# Patient Record
Sex: FEMALE | Race: WHITE | NOT HISPANIC OR LATINO | Employment: OTHER | URBAN - METROPOLITAN AREA
[De-identification: names, ages, dates, MRNs, and addresses within clinical notes are randomized per-mention and may not be internally consistent; named-entity substitution may affect disease eponyms.]

---

## 2017-01-26 ENCOUNTER — OFFICE VISIT (OUTPATIENT)
Dept: CARDIOLOGY | Facility: MEDICAL CENTER | Age: 77
End: 2017-01-26
Payer: MEDICARE

## 2017-01-26 VITALS
OXYGEN SATURATION: 96 % | BODY MASS INDEX: 28.66 KG/M2 | SYSTOLIC BLOOD PRESSURE: 140 MMHG | WEIGHT: 146 LBS | DIASTOLIC BLOOD PRESSURE: 80 MMHG | HEART RATE: 84 BPM | HEIGHT: 60 IN

## 2017-01-26 DIAGNOSIS — I72.0 CAROTID ARTERY ANEURYSM (HCC): ICD-10-CM

## 2017-01-26 DIAGNOSIS — E78.5 HYPERLIPIDEMIA WITH TARGET LDL LESS THAN 100: ICD-10-CM

## 2017-01-26 DIAGNOSIS — Z98.890 S/P CARDIAC CATHETERIZATION: Chronic | ICD-10-CM

## 2017-01-26 DIAGNOSIS — I44.2 COMPLETE HEART BLOCK (HCC): ICD-10-CM

## 2017-01-26 PROCEDURE — G8420 CALC BMI NORM PARAMETERS: HCPCS | Performed by: INTERNAL MEDICINE

## 2017-01-26 PROCEDURE — 1036F TOBACCO NON-USER: CPT | Performed by: INTERNAL MEDICINE

## 2017-01-26 PROCEDURE — G8432 DEP SCR NOT DOC, RNG: HCPCS | Performed by: INTERNAL MEDICINE

## 2017-01-26 PROCEDURE — 99214 OFFICE O/P EST MOD 30 MIN: CPT | Performed by: INTERNAL MEDICINE

## 2017-01-26 PROCEDURE — 1101F PT FALLS ASSESS-DOCD LE1/YR: CPT | Mod: 8P | Performed by: INTERNAL MEDICINE

## 2017-01-26 PROCEDURE — G8484 FLU IMMUNIZE NO ADMIN: HCPCS | Performed by: INTERNAL MEDICINE

## 2017-01-26 PROCEDURE — 4040F PNEUMOC VAC/ADMIN/RCVD: CPT | Performed by: INTERNAL MEDICINE

## 2017-01-26 RX ORDER — DOXYCYCLINE HYCLATE 100 MG
100 TABLET ORAL 2 TIMES DAILY
COMMUNITY
End: 2018-06-21

## 2017-01-26 ASSESSMENT — ENCOUNTER SYMPTOMS
DIZZINESS: 0
NAUSEA: 0
HEARTBURN: 0
LOSS OF CONSCIOUSNESS: 0
WEIGHT LOSS: 0
MUSCULOSKELETAL NEGATIVE: 1
NERVOUS/ANXIOUS: 0
BRUISES/BLEEDS EASILY: 0
PALPITATIONS: 0
SHORTNESS OF BREATH: 0
EYES NEGATIVE: 1
COUGH: 0

## 2017-01-26 NOTE — MR AVS SNAPSHOT
Kaila Choudhary   2017 1:15 PM   Office Visit   MRN: [id number]    Department:  [facility]   Dept Phone:  [phone]    Description:  Female : 1940   Provider:  Julienne Mar M.D.           Reason for Visit     Follow-Up           Allergies as of 2017     Allergen Noted Reactions    Sulfa Drugs 2010   Rash, Swelling    Anasarca-especially face    Latex 10/09/2013   Rash, Itching    Tape 2010   Rash    Latex tape  (paper tape OK)      You were diagnosed with     Hyperlipidemia with target LDL less than 100   [581370]       S/P cardiac catheterization   [668490]       Carotid artery aneurysm (CMS-HCC)   [302920]       Complete heart block (CMS-HCC)   [888055]         Vital Signs     Blood Pressure Pulse Height Weight Body Mass Index Oxygen Saturation    140/80 mmHg 84 1.524 m (5') 66.225 kg (146 lb) 28.51 kg/m2 96%    Smoking Status                   Never Smoker            Basic Information     Date Of Birth Sex Race Ethnicity Preferred Language    1940 Female White Non- English      Your appointments     2017  9:00 AM   Established Patient with Zoltan Devlin M.D.   [facility]    [address]  [facility] [id number] [phone]           You will be receiving a confirmation call a few days before your appointment from our automated call confirmation system.              Problem List              ICD-10-CM Priority Class Noted - Resolved    Hyperlipidemia with target LDL less than 100 E78.5   2011 - Present    Osteopenia M85.80   2013 - Present    Hypothyroidism E03.9   2013 - Present    Varicose veins I86.8   2013 - Present    Elevated blood pressure I10   2013 - Present    Vitamin D insufficiency E55.9   2015 - Present    S/P cardiac catheterization, minimal coronary artery disease 2016 (Chronic) Z98.890   Unknown - Present    Nonintractable episodic  headache R51   10/3/2016 - Present    HTN (hypertension) I10   10/12/2016 - Present    Hypothyroidism E03.9   10/12/2016 - Present    Carotid artery aneurysm (CMS-HCC) I72.0   1/26/2017 - Present    Complete heart block (CMS-HCC) I44.2   1/26/2017 - Present      Health Maintenance        Date Due Completion Dates    PAP SMEAR 5/11/1961 ---    IMM ZOSTER VACCINE 5/11/2000 ---    IMM INFLUENZA (1) 9/1/2016 ---    MAMMOGRAM 12/14/2016 12/14/2015, 12/11/2014, 3/22/2013, 3/18/2013, 3/7/2012, 12/23/2010, 11/24/2009, 11/24/2009, 10/23/2008, 10/23/2008, 10/1/2007, 10/1/2007, 9/12/2006, 7/27/2005    COLONOSCOPY 1/1/2019 1/1/2009 (Done)    Override on 1/1/2009: Done    BONE DENSITY 12/14/2020 12/14/2015, 3/18/2013, 12/23/2010, 11/6/2008    IMM DTaP/Tdap/Td Vaccine (2 - Td) 1/2/2023 1/2/2013            Current Immunizations     13-VALENT PCV PREVNAR 10/3/2016    Pneumococcal polysaccharide vaccine (PPSV-23) 10/16/2008    Tdap Vaccine 1/2/2013      Below and/or attached are the medications your provider expects you to take. Review all of your home medications and newly ordered medications with your provider and/or pharmacist. Follow medication instructions as directed by your provider and/or pharmacist. Please keep your medication list with you and share with your provider. Update the information when medications are discontinued, doses are changed, or new medications (including over-the-counter products) are added; and carry medication information at all times in the event of emergency situations     Allergies:  SULFA DRUGS - Rash,Swelling     LATEX - Rash,Itching     TAPE - Rash               Medications  Valid as of: January 26, 2017 -  1:51 PM    Generic Name Brand Name Tablet Size Instructions for use    Aspirin (Chew Tab) ASA 81 MG Take 81 mg by mouth every morning.        Atorvastatin Calcium (Tab) LIPITOR 40 MG Take 1 Tab by mouth every day.        Calcium Carbonate (Tab) OS-KINGA 500 1250 (500 CA) MG Take 1,250 mg by  mouth every morning.        Cholecalciferol (Tab) cholecalciferol 1000 UNIT Take 1,000 Units by mouth every morning.        Doxycycline Hyclate (Tab) VIBRAMYCIN 100 MG Take 100 mg by mouth 2 times a day.        Levothyroxine Sodium (Tab) SYNTHROID 88 MCG Take 1 tablet by mouth  every morning on an empty  stomach        .                 Medicines prescribed today were sent to:     [facility] - [address]    [address] [facility] [address] [facility] [address]    Phone: [phone] Fax: [phone]    Open 24 Hours?: No    [id number] [place] [place] - [address]    [address] [place] [address]    Phone: [phone] Fax: [phone]    Open 24 Hours?: No      Medication refill instructions:       If your prescription bottle indicates you have medication refills left, it is not necessary to call your provider’s office. Please contact your pharmacy and they will refill your medication.    If your prescription bottle indicates you do not have any refills left, you may request refills at any time through one of the following ways: The online GoMore system (except Urgent Care), by calling your provider’s office, or by asking your pharmacy to contact your provider’s office with a refill request. Medication refills are processed only during regular business hours and may not be available until the next business day. Your provider may request additional information or to have a follow-up visit with you prior to refilling your medication.   *Please Note: Medication refills are assigned a new Rx number when refilled electronically. Your pharmacy may indicate that no refills were authorized even though a new prescription for the same medication is available at the pharmacy. Please request the medicine by name with the pharmacy before contacting your provider for a refill.           GoMore Access Code: Activation code not generated  Current GoMore Status: Active

## 2017-01-26 NOTE — PROGRESS NOTES
"Subjective:   Kaila Choudhary is a 76 y.o. female who presents today in follow-up in regards to her mild coronary disease and new pacemaker put in for heart block and syncope last fall    She is switching her care from her previous cardiologist  She is in with her     Offers no complaints, medications as directed    Past Medical History   Diagnosis Date   • Thyroid disease    • Hyperlipidemia    • OSTEOPOROSIS 2/13/2013   • Hypothyroidism 2/13/2013   • Skin infection 6/25/2014   • Cholesterol blood decreased    • Cerebral aneurysm    • Arthritis      L hip   • Pain      L hip is \"nagging\"   • Dental disorder      front upper veneers   • CATARACT      R eye repaired   • Hypertension    • S/P cardiac catheterization    • Cancer (CMS-[facility]) 2013     basal cell head     Past Surgical History   Procedure Laterality Date   • Recovery  10/16/2013     Performed by [facility] at SURGERY [facility] ORS   • Shoulder surgery  1998   • Gyn surgery  1982     hysterectomy   • Other  2004     R cataract-IOL   • Recovery  7/24/2014     Performed by [facility] at SURGERY SAME DAY [facility] ORS   • Recovery  3/21/2016     Procedure: CATH LAB [facility] WITH POSSIBLE WIEDENBECK ;  Surgeon: Recoveryonly Surgery;  [place] [facility];  Service:    • Tonsillectomy       Family History   Problem Relation Age of Onset   • Heart Disease Mother    • Heart Disease Father      History   Smoking status   • Never Smoker    Smokeless tobacco   • Never Used     Allergies   Allergen Reactions   • Sulfa Drugs Rash and Swelling     Anasarca-especially face   • Latex Rash and Itching   • Tape Rash     Latex tape  (paper tape OK)     Outpatient Encounter Prescriptions as of 1/26/2017   Medication Sig Dispense Refill   • doxycycline (VIBRAMYCIN) 100 MG Tab Take 100 mg by mouth 2 times a day.     • levothyroxine (SYNTHROID) 88 MCG Tab Take 1 tablet by mouth  every morning on an empty  stomach 100 Tab 3   • " atorvastatin (LIPITOR) 40 MG Tab Take 1 Tab by mouth every day. 90 Tab 3   • calcium carbonate (OS-KINGA 500) 1250 MG TABS Take 1,250 mg by mouth every morning.     • vitamin D (CHOLECALCIFEROL) 1000 UNIT TABS Take 1,000 Units by mouth every morning.     • aspirin (ASA) 81 MG CHEW Take 81 mg by mouth every morning.       No facility-administered encounter medications on file as of 1/26/2017.     Review of Systems   Constitutional: Negative for weight loss and malaise/fatigue.   Eyes: Negative.    Respiratory: Negative for cough and shortness of breath.    Cardiovascular: Negative for chest pain, palpitations and leg swelling.   Gastrointestinal: Negative for heartburn and nausea.   Musculoskeletal: Negative.    Neurological: Negative for dizziness and loss of consciousness.   Endo/Heme/Allergies: Does not bruise/bleed easily.   Psychiatric/Behavioral: The patient is not nervous/anxious.    All other systems reviewed and are negative.       Objective:   /80 mmHg  Pulse 84  Ht 1.524 m (5')  Wt 66.225 kg (146 lb)  BMI 28.51 kg/m2  SpO2 96%    Physical Exam   Constitutional: She is oriented to person, place, and time.   Eyes: EOM are normal. Pupils are equal, round, and reactive to light.   Neck: No JVD present.   Cardiovascular: Normal rate and regular rhythm.    Pacemaker left chest   Pulmonary/Chest: Breath sounds normal.   Abdominal: Bowel sounds are normal.   Musculoskeletal: She exhibits edema (trace nonpitting bilaterally).   Neurological: She is alert and oriented to person, place, and time. No cranial nerve deficit.   Skin: Skin is warm and dry.       Assessment:     1. Hyperlipidemia with target LDL less than 100     2. S/P cardiac catheterization, minimal coronary artery disease April 2016     3. Carotid artery aneurysm (CMS-[facility])         Medical Decision Making:  Today's Assessment / Status / Plan:     I did take the time to review her chart, she had been somewhat upset after her urgent hospital stay  last fall. We went through this and talked about heart block and syncope in the differential diagnosis    Reassurance is given  We spent more than 20 minutes    I also reviewed her pacemaker check which is quite excellent. We'll try to amalgamate into one day    Lipids on statin, no changes  Coronary disease, as above  RTC 4-6 months with a visit and pacemaker check

## 2017-05-02 ENCOUNTER — PATIENT OUTREACH (OUTPATIENT)
Dept: HEALTH INFORMATION MANAGEMENT | Facility: OTHER | Age: 77
End: 2017-05-02

## 2017-05-02 NOTE — PROGRESS NOTES
Outcome: Left Message    WebIZ Checked & Epic Updated:  yes    HealthConnect Verified: no    Attempt # 1ST

## 2017-05-18 NOTE — PROGRESS NOTES
Attempt #:2    WebIZ Checked & Epic Updated: yes  HealthConnect Verified: no  Verify PCP: yes    Communication Preference Obtained: yes -- PCP updated in patient's chart.     Review Care Team: yes    Annual Wellness Visit Scheduling  1. Scheduling Status:Not Scheduled. Patient states they are under Doctor's care -- Will have this appointment completed with new PCP    Care Gap Scheduling -- Informed of care gaps due to discuss with new PCP     Health Maintenance Due   Topic Date Due   • PAP SMEAR  05/11/1961   • IMM ZOSTER VACCINE  05/11/2000   • Annual Wellness Visit  10/20/2016   • MAMMOGRAM  12/14/2016         Feedback Activation: already active  Feedback Marquita: no  Virtual Visits: no  Opt In to Text Messages: no

## 2017-05-19 ENCOUNTER — OFFICE VISIT (OUTPATIENT)
Dept: CARDIOLOGY | Facility: MEDICAL CENTER | Age: 77
End: 2017-05-19
Payer: MEDICARE

## 2017-05-19 ENCOUNTER — NON-PROVIDER VISIT (OUTPATIENT)
Dept: CARDIOLOGY | Facility: MEDICAL CENTER | Age: 77
End: 2017-05-19
Payer: MEDICARE

## 2017-05-19 VITALS
WEIGHT: 143 LBS | OXYGEN SATURATION: 93 % | SYSTOLIC BLOOD PRESSURE: 140 MMHG | DIASTOLIC BLOOD PRESSURE: 80 MMHG | HEIGHT: 60 IN | HEART RATE: 72 BPM | BODY MASS INDEX: 28.07 KG/M2

## 2017-05-19 DIAGNOSIS — F43.21 GRIEF REACTION: ICD-10-CM

## 2017-05-19 DIAGNOSIS — I44.2 COMPLETE HEART BLOCK (HCC): ICD-10-CM

## 2017-05-19 PROBLEM — F43.20 GRIEF REACTION: Status: ACTIVE | Noted: 2017-05-19

## 2017-05-19 PROCEDURE — 99213 OFFICE O/P EST LOW 20 MIN: CPT | Performed by: INTERNAL MEDICINE

## 2017-05-19 PROCEDURE — 4040F PNEUMOC VAC/ADMIN/RCVD: CPT | Performed by: INTERNAL MEDICINE

## 2017-05-19 PROCEDURE — 1101F PT FALLS ASSESS-DOCD LE1/YR: CPT | Mod: 8P | Performed by: INTERNAL MEDICINE

## 2017-05-19 PROCEDURE — 1036F TOBACCO NON-USER: CPT | Performed by: INTERNAL MEDICINE

## 2017-05-19 PROCEDURE — G8432 DEP SCR NOT DOC, RNG: HCPCS | Performed by: INTERNAL MEDICINE

## 2017-05-19 PROCEDURE — G8419 CALC BMI OUT NRM PARAM NOF/U: HCPCS | Performed by: INTERNAL MEDICINE

## 2017-05-19 PROCEDURE — 93280 PM DEVICE PROGR EVAL DUAL: CPT | Performed by: INTERNAL MEDICINE

## 2017-05-19 ASSESSMENT — ENCOUNTER SYMPTOMS
INSOMNIA: 1
SHORTNESS OF BREATH: 0
EYES NEGATIVE: 1
BRUISES/BLEEDS EASILY: 0
NERVOUS/ANXIOUS: 1
PALPITATIONS: 0
MUSCULOSKELETAL NEGATIVE: 1
DIZZINESS: 0
COUGH: 0
WEIGHT LOSS: 1
LOSS OF CONSCIOUSNESS: 0
HEARTBURN: 0
MEMORY LOSS: 0
NAUSEA: 0
DEPRESSION: 1

## 2017-05-19 NOTE — Clinical Note
"     [facility]   [address] [facility] [facility] [address]  [place] [address]  Phone: [phone]  Fax: [phone]              Kaila Choudhary  1940    Encounter Date: 5/19/2017    Julienne Mar M.D.          PROGRESS NOTE:  Subjective:   Kaila Choudhary is a 77 y.o. female who presents today in follow-up in regards to her mild coronary disease noted in 2016 as well as her pacemaker for complete heart block last year    In with her daughter Pastora, she lost her  to pneumonia in March. Trying to get along. Trying to eat    Past Medical History   Diagnosis Date   • Thyroid disease    • Hyperlipidemia    • OSTEOPOROSIS 2/13/2013   • Hypothyroidism 2/13/2013   • Skin infection 6/25/2014   • Cholesterol blood decreased    • Cerebral aneurysm    • Arthritis      L hip   • Pain      L hip is \"nagging\"   • Dental disorder      front upper veneers   • CATARACT      R eye repaired   • Hypertension    • S/P cardiac catheterization    • Cancer (CMS-HCC) 2013     basal cell head     Past Surgical History   Procedure Laterality Date   • Recovery  10/16/2013     Performed by [facility] at SURGERY [facility] ORS   • Shoulder surgery  1998   • Gyn surgery  1982     hysterectomy   • Other  2004     R cataract-IOL   • Recovery  7/24/2014     Performed by [facility]Recovery Surgery at SURGERY SAME DAY [facility] ORS   • Recovery  3/21/2016     Procedure: CATH LAB [facility] WITH POSSIBLE WIEDENBECK ;  Surgeon: Recoveryonly Surgery;  Location: SURGERY PRE-POST PROC UNIT [facility];  Service:    • Tonsillectomy       Family History   Problem Relation Age of Onset   • Heart Disease Mother    • Heart Disease Father      History   Smoking status   • Never Smoker    Smokeless tobacco   • Never Used     Allergies   Allergen Reactions   • Sulfa Drugs Rash and Swelling     Anasarca-especially face   • Latex Rash and Itching   • Tape Rash     Latex tape  (paper tape OK)     Outpatient Encounter " Prescriptions as of 5/19/2017   Medication Sig Dispense Refill   • levothyroxine (SYNTHROID) 88 MCG Tab Take 1 tablet by mouth  every morning on an empty  stomach 100 Tab 3   • atorvastatin (LIPITOR) 40 MG Tab Take 1 Tab by mouth every day. 90 Tab 3   • calcium carbonate (OS-KINGA 500) 1250 MG TABS Take 1,250 mg by mouth every morning.     • vitamin D (CHOLECALCIFEROL) 1000 UNIT TABS Take 1,000 Units by mouth every morning.     • aspirin (ASA) 81 MG CHEW Take 81 mg by mouth every morning.     • doxycycline (VIBRAMYCIN) 100 MG Tab Take 100 mg by mouth 2 times a day.       No facility-administered encounter medications on file as of 5/19/2017.     Review of Systems   Constitutional: Positive for weight loss. Negative for malaise/fatigue.   Eyes: Negative.    Respiratory: Negative for cough and shortness of breath.    Cardiovascular: Negative for chest pain, palpitations and leg swelling.   Gastrointestinal: Negative for heartburn and nausea.   Musculoskeletal: Negative.    Neurological: Negative for dizziness and loss of consciousness.   Endo/Heme/Allergies: Does not bruise/bleed easily.   Psychiatric/Behavioral: Positive for depression. Negative for suicidal ideas and memory loss. The patient is nervous/anxious and has insomnia.    All other systems reviewed and are negative.       Objective:   /80 mmHg  Pulse 72  Ht 1.524 m (5')  Wt 64.864 kg (143 lb)  BMI 27.93 kg/m2  SpO2 93%    Physical Exam   Constitutional: She is oriented to person, place, and time.   HENT:   Head: Normocephalic and atraumatic.   Eyes: EOM are normal. Pupils are equal, round, and reactive to light. No scleral icterus.   Neck: No JVD present. No thyromegaly present.   Cardiovascular: Normal rate and regular rhythm.    Pacemaker left chest   Pulmonary/Chest: Breath sounds normal.   Abdominal: Bowel sounds are normal. She exhibits no distension.   Musculoskeletal: She exhibits edema (trace nonpitting bilaterally).   Neurological: She is  alert and oriented to person, place, and time. No cranial nerve deficit. She exhibits normal muscle tone. Coordination normal.   Skin: Skin is warm and dry.   Psychiatric:   Tearful but appropriate       Assessment:     1. Grief reaction         Medical Decision Making:  Today's Assessment / Status / Plan:     Heart block, we reviewed her pacemaker check. She has had no more fibrillation, only 3 very short bouts of SVT. These were in January. No changes made. Talked at length about this. I certainly would not give her medication, she is feeling poorly    Coronary disease, on a statin and a baby aspirin. Asymptomatic and highly functional    Counseled on grief, condolences given  RTC for pacemaker check in 6 months, I will see her back in the year    If she has more palpitations or SVT I will check a TSH again      Kj Mock D.O.  [address]   [address]  [place] [address]  VIA Facsimile: [phone]

## 2017-05-19 NOTE — MR AVS SNAPSHOT
Kaila Choudhary   2017 4:00 PM   Office Visit   MRN: [id number]    Department:  [facility]   Dept Phone:  [phone]    Description:  Female : 1940   Provider:  Julienne Mar M.D.           Reason for Visit     Follow-Up           Allergies as of 2017     Allergen Noted Reactions    Sulfa Drugs 2010   Rash, Swelling    Anasarca-especially face    Latex 10/09/2013   Rash, Itching    Tape 2010   Rash    Latex tape  (paper tape OK)      Vital Signs     Blood Pressure Pulse Height Weight Body Mass Index Oxygen Saturation    140/80 mmHg 72 1.524 m (5') 64.864 kg (143 lb) 27.93 kg/m2 93%    Smoking Status                   Never Smoker            Basic Information     Date Of Birth Sex Race Ethnicity Preferred Language    1940 Female White Non- English      Problem List              ICD-10-CM Priority Class Noted - Resolved    Hyperlipidemia with target LDL less than 100 E78.5   2011 - Present    Osteopenia M85.80   2013 - Present    Hypothyroidism E03.9   2013 - Present    Varicose veins I86.8   2013 - Present    Elevated blood pressure [id number]   2013 - Present    Vitamin D insufficiency E55.9   2015 - Present    S/P cardiac catheterization, minimal coronary artery disease 2016 (Chronic) Z98.890   Unknown - Present    Nonintractable episodic headache R51   10/3/2016 - Present    HTN (hypertension) I10   10/12/2016 - Present    Hypothyroidism E03.9   10/12/2016 - Present    Carotid artery aneurysm (CMS-HCC) I72.0   2017 - Present    Complete heart block (CMS-HCC) I44.2   2017 - Present      Health Maintenance        Date Due Completion Dates    PAP SMEAR 1961 ---    IMM ZOSTER VACCINE 2000 ---    MAMMOGRAM 2016, 2014, 3/22/2013, 3/18/2013, 3/7/2012, 2010, 2009, 2009, 10/23/2008, 10/23/2008, 10/1/2007, 10/1/2007, 2006, 2005    COLONOSCOPY 2019  1/1/2009 (Done)    Override on 1/1/2009: Done    BONE DENSITY 12/14/2020 12/14/2015, 3/18/2013, 12/23/2010, 11/6/2008    IMM DTaP/Tdap/Td Vaccine (2 - Td) 1/2/2023 1/2/2013            Current Immunizations     13-VALENT PCV PREVNAR 10/3/2016    Pneumococcal polysaccharide vaccine (PPSV-23) 10/16/2008    Tdap Vaccine 1/2/2013      Below and/or attached are the medications your provider expects you to take. Review all of your home medications and newly ordered medications with your provider and/or pharmacist. Follow medication instructions as directed by your provider and/or pharmacist. Please keep your medication list with you and share with your provider. Update the information when medications are discontinued, doses are changed, or new medications (including over-the-counter products) are added; and carry medication information at all times in the event of emergency situations     Allergies:  SULFA DRUGS - Rash,Swelling     LATEX - Rash,Itching     TAPE - Rash               Medications  Valid as of: May 19, 2017 -  4:07 PM    Generic Name Brand Name Tablet Size Instructions for use    Aspirin (Chew Tab) ASA 81 MG Take 81 mg by mouth every morning.        Atorvastatin Calcium (Tab) LIPITOR 40 MG Take 1 Tab by mouth every day.        Calcium Carbonate (Tab) OS-KINGA 500 1250 (500 CA) MG Take 1,250 mg by mouth every morning.        Cholecalciferol (Tab) cholecalciferol 1000 UNIT Take 1,000 Units by mouth every morning.        Doxycycline Hyclate (Tab) VIBRAMYCIN 100 MG Take 100 mg by mouth 2 times a day.        Levothyroxine Sodium (Tab) SYNTHROID 88 MCG Take 1 tablet by mouth  every morning on an empty  stomach        .                 Medicines prescribed today were sent to:     [facility] - [address]    [address] [place] [address] [address] [facility] [address]    Phone: [phone] Fax: [phone]    Open 24 Hours?: No    [id number] [place] - [address] [facility]    [address] [place] [address]    Phone: [phone] Fax: [phone]    Open 24 Hours?: No      Medication refill instructions:       If your prescription bottle indicates you have medication refills left, it is not necessary to call your provider’s office. Please contact your pharmacy and they will refill your medication.    If your prescription bottle indicates you do not have any refills left, you may request refills at any time through one of the following ways: The online AboutOne system (except Urgent Care), by calling your provider’s office, or by asking your pharmacy to contact your provider’s office with a refill request. Medication refills are processed only during regular business hours and may not be available until the next business day. Your provider may request additional information or to have a follow-up visit with you prior to refilling your medication.   *Please Note: Medication refills are assigned a new Rx number when refilled electronically. Your pharmacy may indicate that no refills were authorized even though a new prescription for the same medication is available at the pharmacy. Please request the medicine by name with the pharmacy before contacting your provider for a refill.           AboutOne Access Code: Activation code not generated  Current AboutOne Status: Active

## 2017-05-19 NOTE — PROGRESS NOTES
"Subjective:   Kaila Choudhary is a 77 y.o. female who presents today in follow-up in regards to her mild coronary disease noted in 2016 as well as her pacemaker for complete heart block last year    In with her daughter Pastora, she lost her  to pneumonia in March. Trying to get along. Trying to eat    Past Medical History   Diagnosis Date   • Thyroid disease    • Hyperlipidemia    • OSTEOPOROSIS 2/13/2013   • Hypothyroidism 2/13/2013   • Skin infection 6/25/2014   • Cholesterol blood decreased    • Cerebral aneurysm    • Arthritis      L hip   • Pain      L hip is \"nagging\"   • Dental disorder      front upper veneers   • CATARACT      R eye repaired   • Hypertension    • S/P cardiac catheterization    • Cancer ([facility]) 2013     basal cell head     Past Surgical History   Procedure Laterality Date   • Recovery  10/16/2013     Performed by [facility]Recovery Surgery at SURGERY [facility] ORS   • Shoulder surgery  1998   • Gyn surgery  1982     hysterectomy   • Other  2004     R cataract-IOL   • Recovery  7/24/2014     Performed by Ir-Recovery Surgery at SURGERY SAME DAY [facility] ORS   • Recovery  3/21/2016     Procedure: CATH LAB [facility] WITH POSSIBLE WIEDENBECK ;  Surgeon: Recoveryonly Surgery;  Location: SURGERY PRE-POST PROC UNIT [facility] [place];  Service:    • Tonsillectomy       Family History   Problem Relation Age of Onset   • Heart Disease Mother    • Heart Disease Father      History   Smoking status   • Never Smoker    Smokeless tobacco   • Never Used     Allergies   Allergen Reactions   • Sulfa Drugs Rash and Swelling     Anasarca-especially face   • Latex Rash and Itching   • Tape Rash     Latex tape  (paper tape OK)     Outpatient Encounter Prescriptions as of 5/19/2017   Medication Sig Dispense Refill   • levothyroxine (SYNTHROID) 88 MCG Tab Take 1 tablet by mouth  every morning on an empty  stomach 100 Tab 3   • atorvastatin (LIPITOR) 40 MG Tab Take 1 Tab by mouth every day. 90 Tab 3   • calcium carbonate " (OS-KINGA 500) 1250 MG TABS Take 1,250 mg by mouth every morning.     • vitamin D (CHOLECALCIFEROL) 1000 UNIT TABS Take 1,000 Units by mouth every morning.     • aspirin (ASA) 81 MG CHEW Take 81 mg by mouth every morning.     • doxycycline (VIBRAMYCIN) 100 MG Tab Take 100 mg by mouth 2 times a day.       No facility-administered encounter medications on file as of 5/19/2017.     Review of Systems   Constitutional: Positive for weight loss. Negative for malaise/fatigue.   Eyes: Negative.    Respiratory: Negative for cough and shortness of breath.    Cardiovascular: Negative for chest pain, palpitations and leg swelling.   Gastrointestinal: Negative for heartburn and nausea.   Musculoskeletal: Negative.    Neurological: Negative for dizziness and loss of consciousness.   Endo/Heme/Allergies: Does not bruise/bleed easily.   Psychiatric/Behavioral: Positive for depression. Negative for suicidal ideas and memory loss. The patient is nervous/anxious and has insomnia.    All other systems reviewed and are negative.       Objective:   /80 mmHg  Pulse 72  Ht 1.524 m (5')  Wt 64.864 kg (143 lb)  BMI 27.93 kg/m2  SpO2 93%    Physical Exam   Constitutional: She is oriented to person, place, and time.   HENT:   Head: Normocephalic and atraumatic.   Eyes: EOM are normal. Pupils are equal, round, and reactive to light. No scleral icterus.   Neck: No JVD present. No thyromegaly present.   Cardiovascular: Normal rate and regular rhythm.    Pacemaker left chest   Pulmonary/Chest: Breath sounds normal.   Abdominal: Bowel sounds are normal. She exhibits no distension.   Musculoskeletal: She exhibits edema (trace nonpitting bilaterally).   Neurological: She is alert and oriented to person, place, and time. No cranial nerve deficit. She exhibits normal muscle tone. Coordination normal.   Skin: Skin is warm and dry.   Psychiatric:   Tearful but appropriate       Assessment:     1. Grief reaction         Medical Decision Making:   Today's Assessment / Status / Plan:     Heart block, we reviewed her pacemaker check. She has had no more fibrillation, only 3 very short bouts of SVT. These were in January. No changes made. Talked at length about this. I certainly would not give her medication, she is feeling poorly    Coronary disease, on a statin and a baby aspirin. Asymptomatic and highly functional    Counseled on grief, condolences given  RTC for pacemaker check in 6 months, I will see her back in the year    If she has more palpitations or SVT I will check a TSH again

## 2017-11-21 ENCOUNTER — NON-PROVIDER VISIT (OUTPATIENT)
Dept: CARDIOLOGY | Facility: MEDICAL CENTER | Age: 77
End: 2017-11-21
Payer: MEDICARE

## 2017-11-21 DIAGNOSIS — I44.2 COMPLETE HEART BLOCK (HCC): ICD-10-CM

## 2017-11-21 DIAGNOSIS — Z95.0 CARDIAC PACEMAKER IN SITU: ICD-10-CM

## 2017-11-21 PROCEDURE — 93280 PM DEVICE PROGR EVAL DUAL: CPT | Performed by: INTERNAL MEDICINE

## 2018-06-21 ENCOUNTER — NON-PROVIDER VISIT (OUTPATIENT)
Dept: CARDIOLOGY | Facility: MEDICAL CENTER | Age: 78
End: 2018-06-21
Payer: MEDICARE

## 2018-06-21 ENCOUNTER — OFFICE VISIT (OUTPATIENT)
Dept: CARDIOLOGY | Facility: MEDICAL CENTER | Age: 78
End: 2018-06-21
Payer: MEDICARE

## 2018-06-21 VITALS
DIASTOLIC BLOOD PRESSURE: 80 MMHG | OXYGEN SATURATION: 98 % | BODY MASS INDEX: 28.7 KG/M2 | SYSTOLIC BLOOD PRESSURE: 120 MMHG | HEIGHT: 61 IN | HEART RATE: 84 BPM | WEIGHT: 152 LBS

## 2018-06-21 DIAGNOSIS — Z95.0 CARDIAC PACEMAKER IN SITU: ICD-10-CM

## 2018-06-21 DIAGNOSIS — Z98.890 S/P CARDIAC CATHETERIZATION: Chronic | ICD-10-CM

## 2018-06-21 DIAGNOSIS — I44.2 COMPLETE HEART BLOCK (HCC): ICD-10-CM

## 2018-06-21 DIAGNOSIS — Z71.89 COUNSELING REGARDING END OF LIFE DECISION MAKING: ICD-10-CM

## 2018-06-21 DIAGNOSIS — I72.0 CAROTID ARTERY ANEURYSM (HCC): ICD-10-CM

## 2018-06-21 PROCEDURE — 99214 OFFICE O/P EST MOD 30 MIN: CPT | Performed by: INTERNAL MEDICINE

## 2018-06-21 PROCEDURE — 93280 PM DEVICE PROGR EVAL DUAL: CPT | Performed by: INTERNAL MEDICINE

## 2018-06-21 PROCEDURE — 99497 ADVNCD CARE PLAN 30 MIN: CPT | Performed by: INTERNAL MEDICINE

## 2018-06-21 RX ORDER — ESCITALOPRAM OXALATE 5 MG/1
5 TABLET ORAL DAILY
COMMUNITY
End: 2018-06-21 | Stop reason: SDUPTHER

## 2018-06-21 RX ORDER — DONEPEZIL HYDROCHLORIDE 5 MG/1
5 TABLET, FILM COATED ORAL NIGHTLY
COMMUNITY
End: 2019-01-03

## 2018-06-21 RX ORDER — ESCITALOPRAM OXALATE 5 MG/1
10 TABLET ORAL DAILY
Qty: 90 TAB | Refills: 1 | Status: SHIPPED | OUTPATIENT
Start: 2018-06-21 | End: 2018-07-13 | Stop reason: SDUPTHER

## 2018-06-21 ASSESSMENT — ENCOUNTER SYMPTOMS
EYES NEGATIVE: 1
FLANK PAIN: 0
DEPRESSION: 1
WEIGHT LOSS: 0
SHORTNESS OF BREATH: 0
PND: 0
DIZZINESS: 0
INSOMNIA: 1
NAUSEA: 0
BACK PAIN: 0
FALLS: 0
PALPITATIONS: 0
LOSS OF CONSCIOUSNESS: 0
MYALGIAS: 0
HEARTBURN: 0
COUGH: 0
MEMORY LOSS: 1

## 2018-06-21 ASSESSMENT — LIFESTYLE VARIABLES: SUBSTANCE_ABUSE: 0

## 2018-06-21 NOTE — PROGRESS NOTES
"Chief Complaint   Patient presents with   • Hyperlipidemia     follow up       Subjective:   Kaila Choudhary is a 78 y.o. female who presents today in follow-up in regards to her hyperlipidemia in the setting of pacemaker for complete heart block 2016    In with her other daughter.  She has 4 of them.  More loss after her , gets discouraged.  Thinking a lot about end-of-life  Medications as directed    Past Medical History:   Diagnosis Date   • Arthritis     L hip   • Cancer (HCC) 2013    basal cell head   • CATARACT     R eye repaired   • Cerebral aneurysm    • Cholesterol blood decreased    • Dental disorder     front upper veneers   • Hyperlipidemia    • Hypertension    • Hypothyroidism 2/13/2013   • OSTEOPOROSIS 2/13/2013   • Pain     L hip is \"nagging\"   • S/P cardiac catheterization    • Skin infection 6/25/2014   • Thyroid disease      Past Surgical History:   Procedure Laterality Date   • RECOVERY  3/21/2016    Procedure: CATH LAB [facility] WITH POSSIBLE WIEDENBECK ;  Surgeon: Recoveryonly Surgery;  [facility] [place] [facility];  Service:    • RECOVERY  7/24/2014    Performed by Ir-Recovery Surgery at SURGERY SAME DAY [facility] ORS   • RECOVERY  10/16/2013    Performed by Ir[facility]Recovery Surgery at SURGERY [facility] ORS   • OTHER  2004    R cataract-IOL   • SHOULDER SURGERY  1998   • GYN SURGERY  1982    hysterectomy   • TONSILLECTOMY       Family History   Problem Relation Age of Onset   • Heart Disease Mother    • Heart Disease Father      Social History     Social History   • Marital status:      Spouse name: N/A   • Number of children: N/A   • Years of education: N/A     Occupational History   • Not on file.     Social History Main Topics   • Smoking status: Never Smoker   • Smokeless tobacco: Never Used   • Alcohol use No   • Drug use: No   • Sexual activity: Not on file     Other Topics Concern   • Not on file     Social History Narrative   • No narrative on file     Allergies " "  Allergen Reactions   • Sulfa Drugs Rash and Swelling     Anasarca-especially face   • Latex Rash and Itching   • Tape Rash     Latex tape  (paper tape OK)     Outpatient Encounter Prescriptions as of 6/21/2018   Medication Sig Dispense Refill   • donepezil (ARICEPT) 5 MG Tab Take 5 mg by mouth every evening.     • Calcium Carbonate-Vitamin D (CALCIUM 600/VITAMIN D PO) Take  by mouth.     • escitalopram (LEXAPRO) 5 MG tablet Take 2 Tabs by mouth every day. 90 Tab 1   • levothyroxine (SYNTHROID) 88 MCG Tab Take 1 tablet by mouth  every morning on an empty  stomach 100 Tab 3   • atorvastatin (LIPITOR) 40 MG Tab Take 1 Tab by mouth every day. 90 Tab 3   • aspirin (ASA) 81 MG CHEW Take 81 mg by mouth every morning.     • [DISCONTINUED] escitalopram (LEXAPRO) 5 MG tablet Take 5 mg by mouth every day.     • [DISCONTINUED] doxycycline (VIBRAMYCIN) 100 MG Tab Take 100 mg by mouth 2 times a day.     • [DISCONTINUED] calcium carbonate (OS-KINGA 500) 1250 MG TABS Take 1,250 mg by mouth every morning.     • [DISCONTINUED] vitamin D (CHOLECALCIFEROL) 1000 UNIT TABS Take 1,000 Units by mouth every morning.       No facility-administered encounter medications on file as of 6/21/2018.      Review of Systems   Constitutional: Positive for malaise/fatigue. Negative for weight loss.   Eyes: Negative.    Respiratory: Negative for cough and shortness of breath.    Cardiovascular: Negative for chest pain, palpitations, leg swelling and PND.   Gastrointestinal: Negative for heartburn and nausea.   Genitourinary: Negative for dysuria and flank pain.   Musculoskeletal: Negative for back pain, falls, joint pain and myalgias.   Neurological: Negative for dizziness and loss of consciousness.   Psychiatric/Behavioral: Positive for depression and memory loss. Negative for substance abuse and suicidal ideas. The patient has insomnia.    All other systems reviewed and are negative.       Objective:   /80   Pulse 84   Ht 1.549 m (5' 1\")   " Wt 68.9 kg (152 lb)   SpO2 98%   BMI 28.72 kg/m²     Physical Exam   Constitutional: She is oriented to person, place, and time. She appears well-developed and well-nourished.   HENT:   Head: Normocephalic.   Eyes: EOM are normal. Pupils are equal, round, and reactive to light. No scleral icterus.   Neck: No JVD present. No thyromegaly present.   Cardiovascular: Normal rate, regular rhythm and intact distal pulses.  Exam reveals no gallop.    No murmur heard.  Pacemaker intact in left chest   Pulmonary/Chest: Breath sounds normal. No respiratory distress. She exhibits no tenderness.   Abdominal: Bowel sounds are normal. She exhibits no distension.   Musculoskeletal: She exhibits no edema.   Lymphadenopathy:     She has no cervical adenopathy.   Neurological: She is alert and oriented to person, place, and time.   Skin: Skin is warm and dry. No rash noted.   Psychiatric: She has a normal mood and affect. Her behavior is normal.       Assessment:     1. Carotid artery aneurysm (HCC)     2. Complete heart block (HCC)     3. S/P cardiac catheterization, minimal coronary artery disease April 2016     4. Cardiac pacemaker in situ     5. Counseling regarding end of life decision making         Medical Decision Making:  Today's Assessment / Status / Plan:       Pacemaker/heart block  Resolved 2016.  Pacemaker check reviewed, no changes made, appropriate device function.  Talked at length about this with her    Hyperlipidemia  Only minimal coronary disease on angiogram 2016.,  Medications as is checks annual blood work with PCP    Anxiety and depression  Grief reaction, I did renew her Lexapro.  She needs to get a primary care doctor and I helped her with this.  They will make some phone calls    End-of-life care discussion.  Spent more than 35 minutes discussing end-of-life care with the patient.  She endorses her DNR decision making in the past.  We filled out a POLST together, she would like to bring it home to discuss  it with her other family members.    RTC 6 months, sooner with concerns.  Pacemaker check only

## 2018-06-28 ENCOUNTER — APPOINTMENT (RX ONLY)
Dept: URBAN - METROPOLITAN AREA CLINIC 4 | Facility: CLINIC | Age: 78
Setting detail: DERMATOLOGY
End: 2018-06-28

## 2018-06-28 DIAGNOSIS — Z85.828 PERSONAL HISTORY OF OTHER MALIGNANT NEOPLASM OF SKIN: ICD-10-CM

## 2018-06-28 DIAGNOSIS — L82.1 OTHER SEBORRHEIC KERATOSIS: ICD-10-CM

## 2018-06-28 PROBLEM — I25.10 ATHEROSCLEROTIC HEART DISEASE OF NATIVE CORONARY ARTERY WITHOUT ANGINA PECTORIS: Status: ACTIVE | Noted: 2018-06-28

## 2018-06-28 PROBLEM — E05.90 THYROTOXICOSIS, UNSPECIFIED WITHOUT THYROTOXIC CRISIS OR STORM: Status: ACTIVE | Noted: 2018-06-28

## 2018-06-28 PROBLEM — I10 ESSENTIAL (PRIMARY) HYPERTENSION: Status: ACTIVE | Noted: 2018-06-28

## 2018-06-28 PROBLEM — F41.9 ANXIETY DISORDER, UNSPECIFIED: Status: ACTIVE | Noted: 2018-06-28

## 2018-06-28 PROBLEM — D48.5 NEOPLASM OF UNCERTAIN BEHAVIOR OF SKIN: Status: ACTIVE | Noted: 2018-06-28

## 2018-06-28 PROCEDURE — ? BIOPSY BY PUNCH METHOD

## 2018-06-28 PROCEDURE — ? COUNSELING

## 2018-06-28 PROCEDURE — 99213 OFFICE O/P EST LOW 20 MIN: CPT | Mod: 25

## 2018-06-28 PROCEDURE — 11100: CPT

## 2018-06-28 ASSESSMENT — LOCATION ZONE DERM
LOCATION ZONE: SCALP
LOCATION ZONE: ARM

## 2018-06-28 ASSESSMENT — LOCATION DETAILED DESCRIPTION DERM
LOCATION DETAILED: LEFT SUPERIOR PARIETAL SCALP
LOCATION DETAILED: RIGHT DISTAL POSTERIOR UPPER ARM
LOCATION DETAILED: RIGHT VENTRAL PROXIMAL FOREARM

## 2018-06-28 ASSESSMENT — LOCATION SIMPLE DESCRIPTION DERM
LOCATION SIMPLE: RIGHT POSTERIOR UPPER ARM
LOCATION SIMPLE: SCALP
LOCATION SIMPLE: RIGHT FOREARM

## 2018-06-28 NOTE — PROCEDURE: BIOPSY BY PUNCH METHOD
Billing Type: Third-Party Bill
Hemostasis: None
Lab Facility: 
Notification Instructions: Patient will be notified of biopsy results. However, patient instructed to call the office if not contacted within 2 weeks.
Anesthesia Volume In Cc: 2
X Depth Of Punch In Cm (Optional): 0
Anesthesia Type: 1% lidocaine with epinephrine
Post-Care Instructions: I reviewed with the patient in detail post-care instructions. Patient is to keep the biopsy site dry overnight, and then apply bacitracin twice daily until healed. Patient may apply hydrogen peroxide soaks to remove any crusting.
Bill 20783 For Specimen Handling/Conveyance To Laboratory?: no
Home Suture Removal Text: Patient was provided a home suture removal kit and will remove their sutures at home.  If they have any questions or difficulties they will call the office.
Detail Level: Detailed
Wound Care: Petrolatum
Epidermal Sutures: 4-0 Nylon
Was A Bandage Applied: Yes
Dressing: bandage
Punch Size In Mm: 4
Consent: Written consent was obtained and risks were reviewed including but not limited to scarring, infection, bleeding, scabbing, incomplete removal, nerve damage and allergy to anesthesia.
Biopsy Type: H and E
Suture Removal: 21 days
Lab: [id number]

## 2018-07-13 DIAGNOSIS — F43.21 GRIEF REACTION: Primary | ICD-10-CM

## 2018-07-13 RX ORDER — ESCITALOPRAM OXALATE 5 MG/1
TABLET ORAL
Qty: 180 TAB | Refills: 2 | Status: SHIPPED | OUTPATIENT
Start: 2018-07-13 | End: 2018-12-04

## 2018-07-18 ENCOUNTER — APPOINTMENT (RX ONLY)
Dept: URBAN - METROPOLITAN AREA CLINIC 4 | Facility: CLINIC | Age: 78
Setting detail: DERMATOLOGY
End: 2018-07-18

## 2018-07-18 DIAGNOSIS — Z85.828 PERSONAL HISTORY OF OTHER MALIGNANT NEOPLASM OF SKIN: ICD-10-CM

## 2018-07-18 DIAGNOSIS — R20.2 PARESTHESIA OF SKIN: ICD-10-CM

## 2018-07-18 PROCEDURE — 99213 OFFICE O/P EST LOW 20 MIN: CPT

## 2018-07-18 PROCEDURE — ? TREATMENT REGIMEN

## 2018-07-18 PROCEDURE — ? COUNSELING

## 2018-07-18 ASSESSMENT — LOCATION SIMPLE DESCRIPTION DERM
LOCATION SIMPLE: SCALP
LOCATION SIMPLE: RIGHT OCCIPITAL SCALP

## 2018-07-18 ASSESSMENT — LOCATION DETAILED DESCRIPTION DERM
LOCATION DETAILED: RIGHT SUPERIOR PARIETAL SCALP
LOCATION DETAILED: RIGHT SUPERIOR OCCIPITAL SCALP

## 2018-07-18 ASSESSMENT — LOCATION ZONE DERM: LOCATION ZONE: SCALP

## 2018-07-18 NOTE — PROCEDURE: TREATMENT REGIMEN
Detail Level: Zone
Plan: Discussed ct scan vs ultrasound vs mri.pt declines an imaging test at this time and prefers to wait and watch. Pt states she sleeps fine and if not focusing on area it does not bother pt.

## 2018-09-10 DIAGNOSIS — I67.1 INTRACRANIAL ANEURYSM: ICD-10-CM

## 2018-09-12 ENCOUNTER — TELEPHONE (OUTPATIENT)
Dept: RADIOLOGY | Facility: MEDICAL CENTER | Age: 78
End: 2018-09-12

## 2018-09-12 NOTE — TELEPHONE ENCOUNTER
Surveillance MRA ordered for monitoring of previously coiled right ICA aneurysm and untreated, small left cavernous ICA aneurysm. Last surveillance was catheter angio 10/2016 with recommended f/u in 2 years (Oct 2018). Note received from imaging scheduler that pt had a question about medical necessity.    LM for pt to return call and discuss.

## 2018-10-17 ENCOUNTER — APPOINTMENT (RX ONLY)
Dept: URBAN - METROPOLITAN AREA CLINIC 4 | Facility: CLINIC | Age: 78
Setting detail: DERMATOLOGY
End: 2018-10-17

## 2018-10-17 DIAGNOSIS — B02.9 ZOSTER WITHOUT COMPLICATIONS: ICD-10-CM

## 2018-10-17 DIAGNOSIS — R20.2 PARESTHESIA OF SKIN: ICD-10-CM

## 2018-10-17 PROCEDURE — ? COUNSELING

## 2018-10-17 PROCEDURE — ? PRESCRIPTION

## 2018-10-17 PROCEDURE — 99213 OFFICE O/P EST LOW 20 MIN: CPT

## 2018-10-17 RX ORDER — VALACYCLOVIR 1 G/1
TABLET ORAL
Qty: 21 | Refills: 0 | Status: ERX

## 2018-10-17 ASSESSMENT — LOCATION SIMPLE DESCRIPTION DERM
LOCATION SIMPLE: SCALP
LOCATION SIMPLE: LEFT LOWER BACK

## 2018-10-17 ASSESSMENT — LOCATION ZONE DERM
LOCATION ZONE: SCALP
LOCATION ZONE: TRUNK

## 2018-10-17 ASSESSMENT — LOCATION DETAILED DESCRIPTION DERM
LOCATION DETAILED: LEFT SUPERIOR PARIETAL SCALP
LOCATION DETAILED: LEFT SUPERIOR MEDIAL MIDBACK

## 2018-12-04 ENCOUNTER — OFFICE VISIT (OUTPATIENT)
Dept: CARDIOLOGY | Facility: MEDICAL CENTER | Age: 78
End: 2018-12-04
Payer: MEDICARE

## 2018-12-04 ENCOUNTER — NON-PROVIDER VISIT (OUTPATIENT)
Dept: CARDIOLOGY | Facility: MEDICAL CENTER | Age: 78
End: 2018-12-04
Payer: MEDICARE

## 2018-12-04 VITALS
BODY MASS INDEX: 28.89 KG/M2 | WEIGHT: 153 LBS | SYSTOLIC BLOOD PRESSURE: 140 MMHG | HEIGHT: 61 IN | OXYGEN SATURATION: 98 % | HEART RATE: 66 BPM | DIASTOLIC BLOOD PRESSURE: 70 MMHG

## 2018-12-04 DIAGNOSIS — I44.2 COMPLETE HEART BLOCK (HCC): ICD-10-CM

## 2018-12-04 DIAGNOSIS — E78.5 HYPERLIPIDEMIA WITH TARGET LDL LESS THAN 100: ICD-10-CM

## 2018-12-04 DIAGNOSIS — Z95.0 CARDIAC PACEMAKER IN SITU: ICD-10-CM

## 2018-12-04 DIAGNOSIS — Z98.890 S/P CARDIAC CATHETERIZATION: Chronic | ICD-10-CM

## 2018-12-04 DIAGNOSIS — I72.0 CAROTID ARTERY ANEURYSM (HCC): ICD-10-CM

## 2018-12-04 PROBLEM — F43.21 GRIEF REACTION: Status: RESOLVED | Noted: 2017-05-19 | Resolved: 2018-12-04

## 2018-12-04 PROBLEM — F43.20 GRIEF REACTION: Status: RESOLVED | Noted: 2017-05-19 | Resolved: 2018-12-04

## 2018-12-04 PROBLEM — Z71.89 COUNSELING REGARDING END OF LIFE DECISION MAKING: Status: RESOLVED | Noted: 2018-06-21 | Resolved: 2018-12-04

## 2018-12-04 PROCEDURE — 99214 OFFICE O/P EST MOD 30 MIN: CPT | Performed by: INTERNAL MEDICINE

## 2018-12-04 PROCEDURE — 93280 PM DEVICE PROGR EVAL DUAL: CPT | Performed by: INTERNAL MEDICINE

## 2018-12-04 RX ORDER — ESCITALOPRAM OXALATE 5 MG/1
10 TABLET ORAL DAILY
COMMUNITY
End: 2019-10-22

## 2018-12-04 ASSESSMENT — ENCOUNTER SYMPTOMS
INSOMNIA: 1
COUGH: 0
ABDOMINAL PAIN: 0
DOUBLE VISION: 0
DIZZINESS: 0
CHILLS: 0
SHORTNESS OF BREATH: 0
PND: 0
FEVER: 0
NAUSEA: 0
FALLS: 0
HEADACHES: 0
MYALGIAS: 0
ORTHOPNEA: 0
BLURRED VISION: 0
PALPITATIONS: 0
HEARTBURN: 0
NERVOUS/ANXIOUS: 1
MEMORY LOSS: 0
DEPRESSION: 1
BRUISES/BLEEDS EASILY: 0
LOSS OF CONSCIOUSNESS: 0

## 2018-12-04 ASSESSMENT — LIFESTYLE VARIABLES: SUBSTANCE_ABUSE: 0

## 2018-12-04 NOTE — PROGRESS NOTES
"Chief Complaint   Patient presents with   • Hyperlipidemia     follow up       Subjective:   Kaila Choudhary is a 78 y.o. female who presents today in follow-up in regards to her hyperlipidemia on a statin as well as complete heart block with pacemaker placement 2016, she also has peripheral arterial disease in the form of a carotid artery aneurysm with intervention in the past    Still struggles with depression anxiety after a very tough year.  Thinks about her .  Her 4 daughters still help her take care of herself.  No setbacks or suicidal ideation    Past Medical History:   Diagnosis Date   • Arthritis     L hip   • Cancer (HCC) 2013    basal cell head   • CATARACT     R eye repaired   • Cerebral aneurysm    • Cholesterol blood decreased    • Dental disorder     front upper veneers   • Hyperlipidemia    • Hypertension    • Hypothyroidism 2/13/2013   • OSTEOPOROSIS 2/13/2013   • Pain     L hip is \"nagging\"   • S/P cardiac catheterization    • Skin infection 6/25/2014   • Thyroid disease      Past Surgical History:   Procedure Laterality Date   • RECOVERY  3/21/2016    Procedure: CATH LAB [facility] WITH POSSIBLE WIEDENBECK ;  Surgeon: Recoveryonly Surgery;  [facility] [place];  Service:    • RECOVERY  7/24/2014    Performed by Ir-Recovery Surgery at SURGERY SAME DAY [facility] ORS   • RECOVERY  10/16/2013    Performed by Ir-Recovery Surgery at SURGERY [facility] ORS   • OTHER  2004    R cataract-IOL   • SHOULDER SURGERY  1998   • GYN SURGERY  1982    hysterectomy   • TONSILLECTOMY       Family History   Problem Relation Age of Onset   • Heart Disease Mother    • Heart Disease Father      Social History     Social History   • Marital status:      Spouse name: N/A   • Number of children: N/A   • Years of education: N/A     Occupational History   • Not on file.     Social History Main Topics   • Smoking status: Never Smoker   • Smokeless tobacco: Never Used   • Alcohol use No   • " Drug use: No   • Sexual activity: Not on file     Other Topics Concern   • Not on file     Social History Narrative   • No narrative on file     Allergies   Allergen Reactions   • Sulfa Drugs Rash and Swelling     Anasarca-especially face   • Latex Rash and Itching   • Tape Rash     Latex tape  (paper tape OK)     Outpatient Encounter Prescriptions as of 12/4/2018   Medication Sig Dispense Refill   • escitalopram (LEXAPRO) 5 MG tablet Take 5 mg by mouth every day.     • donepezil (ARICEPT) 5 MG Tab Take 5 mg by mouth every evening.     • Calcium Carbonate-Vitamin D (CALCIUM 600/VITAMIN D PO) Take  by mouth.     • levothyroxine (SYNTHROID) 88 MCG Tab Take 1 tablet by mouth  every morning on an empty  stomach 100 Tab 3   • atorvastatin (LIPITOR) 40 MG Tab Take 1 Tab by mouth every day. 90 Tab 3   • aspirin (ASA) 81 MG CHEW Take 81 mg by mouth every morning.     • [DISCONTINUED] escitalopram (LEXAPRO) 5 MG tablet TAKE 2 TABLETS BY MOUTH  EVERY DAY (Patient not taking: Reported on 12/4/2018) 180 Tab 2     No facility-administered encounter medications on file as of 12/4/2018.      Review of Systems   Constitutional: Negative for chills and fever.   HENT: Negative for congestion.    Eyes: Negative for blurred vision and double vision.   Respiratory: Negative for cough and shortness of breath.    Cardiovascular: Negative for chest pain, palpitations, orthopnea, leg swelling and PND.   Gastrointestinal: Negative for abdominal pain, heartburn and nausea.   Genitourinary: Negative for dysuria.   Musculoskeletal: Negative for falls, joint pain and myalgias.   Skin: Negative for rash.   Neurological: Negative for dizziness, loss of consciousness and headaches.   Endo/Heme/Allergies: Does not bruise/bleed easily.   Psychiatric/Behavioral: Positive for depression. Negative for memory loss, substance abuse and suicidal ideas. The patient is nervous/anxious and has insomnia.    All other systems reviewed and are negative.        "Objective:   /70 (BP Location: Left arm, Patient Position: Sitting)   Pulse 66   Ht 1.549 m (5' 1\")   Wt 69.4 kg (153 lb)   SpO2 98%   BMI 28.91 kg/m²     Physical Exam   Constitutional: She is oriented to person, place, and time. She appears well-developed and well-nourished.   Walks without assist, patient seen and examined again today changes noted   HENT:   Head: Normocephalic and atraumatic.   Eyes: Pupils are equal, round, and reactive to light. EOM are normal. Left eye exhibits no discharge.   Neck: No JVD present. No thyromegaly present.   Cardiovascular: Normal rate, regular rhythm and intact distal pulses.  Exam reveals no gallop.    No murmur heard.  Pacemaker intact in left chest   Pulmonary/Chest: Breath sounds normal. No respiratory distress. She exhibits no tenderness.   Abdominal: Bowel sounds are normal. She exhibits no distension.   Musculoskeletal: She exhibits no edema.   Lymphadenopathy:     She has no cervical adenopathy.   Neurological: She is alert and oriented to person, place, and time.   Skin: Skin is warm and dry. No rash noted.   Psychiatric: She has a normal mood and affect. Her behavior is normal.       Assessment:     1. S/P cardiac catheterization, minimal coronary artery disease April 2016     2. Hyperlipidemia with target LDL less than 100     3. Complete heart block (HCC)     4. Cardiac pacemaker in situ     5. Carotid artery aneurysm (HCC)         Medical Decision Making:  Today's Assessment / Status / Plan:     Heart block  No setbacks or clinical changes  Lab work requested from her primary from last month.  Apparently thyroid was overtreated and changes made  Device interrogated and reviewed by me, normal function no changes made  Scheduled to see us every 6 months    Hyperlipidemia  Medications including statin renewed no side effects, annual blood work from PCP    End-of-life changes discussed again as last time.  I have filled out her form she is DNR  "

## 2019-01-03 ENCOUNTER — OFFICE VISIT (OUTPATIENT)
Dept: NEUROLOGY | Facility: MEDICAL CENTER | Age: 79
End: 2019-01-03
Payer: MEDICARE

## 2019-01-03 VITALS
HEART RATE: 66 BPM | HEIGHT: 61 IN | DIASTOLIC BLOOD PRESSURE: 60 MMHG | WEIGHT: 155 LBS | OXYGEN SATURATION: 97 % | RESPIRATION RATE: 16 BRPM | SYSTOLIC BLOOD PRESSURE: 130 MMHG | TEMPERATURE: 97.2 F | BODY MASS INDEX: 29.27 KG/M2

## 2019-01-03 DIAGNOSIS — G31.84 MILD COGNITIVE IMPAIRMENT: ICD-10-CM

## 2019-01-03 PROCEDURE — 99205 OFFICE O/P NEW HI 60 MIN: CPT | Performed by: PSYCHIATRY & NEUROLOGY

## 2019-01-03 RX ORDER — DONEPEZIL HYDROCHLORIDE 10 MG/1
10 TABLET, FILM COATED ORAL
Qty: 30 TAB | Refills: 2 | Status: SHIPPED | OUTPATIENT
Start: 2019-01-03 | End: 2019-04-03

## 2019-01-03 ASSESSMENT — PATIENT HEALTH QUESTIONNAIRE - PHQ9: CLINICAL INTERPRETATION OF PHQ2 SCORE: 0

## 2019-01-03 NOTE — ASSESSMENT & PLAN NOTE
Ms. Choudhary is a 79 yo F with pmhx of brain aneurysm s/p coiling procedure, pacemaker due to complete heart block, osteopenia, hypothyroidism and recent adjustment disorder who has been noted by family to have short term memory loss for the past 2 years worse over the past 1 year. Brain MRI from 2016 showed generalized atrophy although the bilateral hippocampi and temporal lobes do look disproportionately atrophied. At this point, she is still functional in her activities of daily living. She is still driving on a limited basis and her daughter vouches that she has not had any incidents where she has gotten lost. I discussed with her that I cannot say whether her memory and condition may worsen, however, her memory testing is consistent with an Alzheimer's type process. She had difficulty with delayed recall - even exhibiting difficulty with registration of words. I recommended she continue with donepezil and increase the dose     Plan:  1. Increase donepezil from 5mg to 10mg at bedtime.   2. She is okay to drive limited radius from home - encouraged her to always have her cell phone with her.   3. I recommended following up with Dr. Mendoza, cognitive specialist, once she arrives

## 2019-01-03 NOTE — PATIENT INSTRUCTIONS
You will increase from Donepezil 5mg a day to 10mg a day.     I have given you a lab slip to have your vitamin B12 level checked.

## 2019-01-03 NOTE — PROGRESS NOTES
CC: Cognitive Impairment      HPI:    Kaila Choudhary is a very pleasant 78 y.o. female with past medical history of anxiety, panic attacks, hypothyroidism, depression, and brain aneurysm status post coiling 2009, who presents today in initial neurologic consultation for cognitive/memory impairment. She is accompanied by her daughter to today's visit. The patient was referred by their primary care provider Shilo Stallings M.D.    Ms. Choudhary has been forgetting things. Her daughter has noticed this for the last couple of years. He feels it has been worsening. His primary care provider started her on Donepezil     SHe has been more depressed since losing several family members over the past year and a half including her , her brother, and a 4 month old grandson.      Onset:: Symptoms began 2 years ago, worse over the past year.     Associated Symptoms:   Hallucinations: No  Tremors  Sleep disturbance:  Seizures: No  Language problems: No  Personality changes: No      ROS:   Constitutional: No fevers or chills.  Eyes: No blurry vision or eye pain.  ENT: No dysphagia or hearing loss.  Respiratory: No cough or shortness of breath.  Cardiovascular: No chest pain or palpitations.  GI: No nausea, vomiting, or diarrhea.  : No urinary incontinence or dysuria.  Musculoskeletal: No joint swelling or arthralgias.  Skin: No skin rashes.  Neuro: No headaches, dizziness, or tremors.  Endocrine: No heat or cold intolerance. No polydipsia or polyuria.  Psych: + depression, no anxiety.  Heme/Lymph: No easy bruising or swollen lymph nodes.  All other review of systems were reviewed and were negative.     Past Medical History:   Past Medical History:   Diagnosis Date   • Skin infection 6/25/2014   • OSTEOPOROSIS 2/13/2013   • Hypothyroidism 2/13/2013   • Cancer (HCC) 2013    basal cell head   • Arthritis     L hip   • CATARACT     R eye repaired   • Cerebral aneurysm    • Cholesterol blood decreased    • Dental disorder  "    front upper veneers   • Hyperlipidemia    • Hypertension    • Pain     L hip is \"nagging\"   • S/P cardiac catheterization    • Thyroid disease        Past Surgical History:   Past Surgical History:   Procedure Laterality Date   • RECOVERY  3/21/2016    Procedure: CATH LAB [facility] WITH POSSIBLE WIEDENBECK ;  Surgeon: [facility];  Location: SURGERY PRE-POST PROC UNIT [facility] [place];  Service:    • RECOVERY  7/24/2014    Performed by [facility] at SURGERY SAME DAY [facility] ORS   • RECOVERY  10/16/2013    Performed by [facility] at SURGERY [facility] ORS   • OTHER  2004    R cataract-IOL   • SHOULDER SURGERY  1998   • GYN SURGERY  1982    hysterectomy   • TONSILLECTOMY         Social History:   Social History     Social History   • Marital status:      Spouse name: N/A   • Number of children: N/A   • Years of education: N/A     Occupational History   • Not on file.     Social History Main Topics   • Smoking status: Never Smoker   • Smokeless tobacco: Never Used   • Alcohol use No   • Drug use: No   • Sexual activity: Not on file     Other Topics Concern   • Not on file     Social History Narrative   • No narrative on file       Family Hx:   Family History   Problem Relation Age of Onset   • Heart Disease Mother    • Heart Disease Father        Current Medications:   Current Outpatient Prescriptions:   •  donepezil (ARICEPT) 10 MG tablet, Take 1 Tab by mouth every bedtime for 90 days., Disp: 30 Tab, Rfl: 2  •  Calcium Carbonate-Vitamin D (CALCIUM 600/VITAMIN D PO), Take  by mouth., Disp: , Rfl:   •  levothyroxine (SYNTHROID) 88 MCG Tab, Take 1 tablet by mouth  every morning on an empty  stomach (Patient taking differently: Take 50 mcg by mouth  every morning on an empty  stomach), Disp: 100 Tab, Rfl: 3  •  atorvastatin (LIPITOR) 40 MG Tab, Take 1 Tab by mouth every day., Disp: 90 Tab, Rfl: 3  •  aspirin (ASA) 81 MG CHEW, Take 81 mg by mouth every morning., Disp: , Rfl:   •  escitalopram (LEXAPRO) " "5 MG tablet, Take 10 mg by mouth every day., Disp: , Rfl:     Allergies:   Allergies   Allergen Reactions   • Sulfa Drugs Rash and Swelling     Anasarca-especially face   • Latex Rash and Itching   • Tape Rash     Latex tape  (paper tape OK)         Physical Exam:   Ambulatory Vitals  Encounter Vitals  Temperature: 36.2 °C (97.2 °F)  Temp src: Temporal  Blood Pressure : 130/60  Pulse: 66  Respiration: 16  Pulse Oximetry: 97 %  Weight: 70.3 kg (155 lb)  Height: 154.9 cm (5' 1\")  BMI (Calculated): 29.29    Constitutional: Well-developed, well-nourished, good hygiene. Appears stated age.  Respiratory: Normal respiratory effort.  Skin: Warm, dry, intact. No rashes observed.  Eyes: Right eyelid ptosis.   Neurologic:   Mental Status: Awake, alert. Did not know the date or day of the week.    Speech: Fluent with normal prosody.   Memory: [place] Cognitive Assessment - 18/30.  Remembered 0/5 on delayed recall. Had difficulty with calculations. Decreased fluency.    Concentration: Attentive. Able to focus on history and follow multi-step commands.   Fund of Knowledge: Appropriate.   Cranial Nerves:    CN II: PERRL. No afferent pupillary defect.    CN III, IV, VI: Good eye closure, EOMI.     CN V: Facial sensation intact and symmetric.     CN VII: No facial asymmetry.     CN VIII: Hearing intact.     CN IX and X: Palate elevates symmetrically. Normal gag reflex.    CN XI: Symmetric shoulder shrug.     CN XII: Tongue midline.    Sensory: Intact light touch, vibration and temperature.    Coordination: No evidence of past-pointing on finger to nose testing, no dysdiadochokinesia. Heel to shin intact.    DTR's: 2+ throughout without clonus.    Babinski: Toes downgoing bilaterally.   Movements: Head tremor.   Musculoskeletal:    Strength: 5/5 in upper and lower extremities bilaterally.   Gait: Steady, narrow based. Slow but no shuffling. Decreased right arm swing.    Tone: Normal bulk and tone.   Joints: No swelling.       Labs " reviewed:  Results for YASH CHOUDHARY (MRN [id number]) as of 1/3/2019 07:46   Ref. Range 10/6/2016 22:11 10/8/2016 04:55   TSH Latest Ref Range: 0.300 - 3.700 uIU/mL 5.070 (H) 2.490     From November 14, 2018  Sodium 143  Potassium 4.4  BUN 13  Alk phos 108  AST 16  ALT 13  HDL 62  LDL 46  TSH 0.024  White blood cell count 5.9  Hemoglobin 12.9  Hematocrit 39.9  MCV 93  Platelets 209    Imaging:   Today I reviewed the patient's most recent MRI images with her in the examination room. I explained basic terminology of MRI's, verbalized my assessment, and answered her questions.     MRI Brain w/o contrast from 10/7/16    1.  Age-related cerebral atrophy.  2.  Mild to moderate periventricular white matter changes consistent with chronic microvascular ischemic gliosis.  3.  No evidence of acute infarction in the brain parenchyma.  4.  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    Assessment/Plan:    Mild cognitive impairment  Ms. Choudhary is a 79 yo F with pmhx of brain aneurysm s/p coiling procedure, pacemaker due to complete heart block, osteopenia, hypothyroidism and recent adjustment disorder who has been noted by family to have short term memory loss for the past 2 years worse over the past 1 year. At this point, she is still functional in her activities of daily living. She is still driving on a limited basis and her daughter vouches that she has not had any incidents where she has gotten lost. I discussed with her that I cannot say whether her memory and condition may worsen, however, her memory testing is consistent with an Alzheimer's type process. She had difficulty with delayed recall - even exhibiting difficulty with registration of words. I recommended she continue with donepezil and increase the dose     Plan:  1. Increase donepezil from 5mg to 10mg at bedtime.   2. She is okay to drive limited radius from home - encouraged her to always have her cell phone  with her.   3. I recommended following up with Dr. Mendoza, cognitive specialist, once she arrives       Greater than 50% of this 60 minute face to face encounter was devoted to disease state counseling on dementia and coordination of care. Additional time was spent on MRI and lab review. (see above assessment and plan for further details of discussion).     Jie Meehan D.O., M.P.H  MS specialist.   Board Certified Neurologist.  Neurology Clerkship Director, [facility].    Neurology,  [facility].   Tel: [phone]  Fax: [phone]

## 2019-01-23 ENCOUNTER — APPOINTMENT (RX ONLY)
Dept: URBAN - METROPOLITAN AREA CLINIC 4 | Facility: CLINIC | Age: 79
Setting detail: DERMATOLOGY
End: 2019-01-23

## 2019-01-23 DIAGNOSIS — Z85.828 PERSONAL HISTORY OF OTHER MALIGNANT NEOPLASM OF SKIN: ICD-10-CM

## 2019-01-23 DIAGNOSIS — I87.2 VENOUS INSUFFICIENCY (CHRONIC) (PERIPHERAL): ICD-10-CM

## 2019-01-23 PROCEDURE — ? COUNSELING

## 2019-01-23 PROCEDURE — 99213 OFFICE O/P EST LOW 20 MIN: CPT

## 2019-01-23 PROCEDURE — ? PRESCRIPTION

## 2019-01-23 PROCEDURE — ? MEDICATION COUNSELING

## 2019-01-23 RX ORDER — CLOBETASOL PROPIONATE 0.5 MG/G
OINTMENT TOPICAL
Qty: 1 | Refills: 0 | Status: ERX | COMMUNITY
Start: 2019-01-23

## 2019-01-23 RX ADMIN — CLOBETASOL PROPIONATE: 0.5 OINTMENT TOPICAL at 18:03

## 2019-01-23 ASSESSMENT — LOCATION DETAILED DESCRIPTION DERM
LOCATION DETAILED: LEFT DISTAL PRETIBIAL REGION
LOCATION DETAILED: RIGHT SUPERIOR PARIETAL SCALP

## 2019-01-23 ASSESSMENT — LOCATION SIMPLE DESCRIPTION DERM
LOCATION SIMPLE: SCALP
LOCATION SIMPLE: LEFT PRETIBIAL REGION

## 2019-01-23 ASSESSMENT — LOCATION ZONE DERM
LOCATION ZONE: LEG
LOCATION ZONE: SCALP

## 2019-01-23 NOTE — PROCEDURE: MEDICATION COUNSELING
Topical Clindamycin Pregnancy And Lactation Text: This medication is Pregnancy Category B and is considered safe during pregnancy. It is unknown if it is excreted in breast milk.
Infliximab Counseling:  I discussed with the patient the risks of infliximab including but not limited to myelosuppression, immunosuppression, autoimmune hepatitis, demyelinating diseases, lymphoma, and serious infections.  The patient understands that monitoring is required including a PPD at baseline and must alert us or the primary physician if symptoms of infection or other concerning signs are noted.
Terbinafine Pregnancy And Lactation Text: This medication is Pregnancy Category B and is considered safe during pregnancy. It is also excreted in breast milk and breast feeding isn't recommended.
Cosentyx Pregnancy And Lactation Text: This medication is Pregnancy Category B and is considered safe during pregnancy. It is unknown if this medication is excreted in breast milk.
Detail Level: Simple
Benzoyl Peroxide Pregnancy And Lactation Text: This medication is Pregnancy Category C. It is unknown if benzoyl peroxide is excreted in breast milk.
Taltz Counseling: I discussed with the patient the risks of ixekizumab including but not limited to immunosuppression, serious infections, worsening of inflammatory bowel disease and drug reactions.  The patient understands that monitoring is required including a PPD at baseline and must alert us or the primary physician if symptoms of infection or other concerning signs are noted.
Protopic Counseling: Patient may experience a mild burning sensation during topical application. Protopic is not approved in children less than 2 years of age. There have been case reports of hematologic and skin malignancies in patients using topical calcineurin inhibitors although causality is questionable.
Bactrim Pregnancy And Lactation Text: This medication is Pregnancy Category D and is known to cause fetal risk.  It is also excreted in breast milk.
Cephalexin Counseling: I counseled the patient regarding use of cephalexin as an antibiotic for prophylactic and/or therapeutic purposes. Cephalexin (commonly prescribed under brand name Keflex) is a cephalosporin antibiotic which is active against numerous classes of bacteria, including most skin bacteria. Side effects may include nausea, diarrhea, gastrointestinal upset, rash, hives, yeast infections, and in rare cases, hepatitis, kidney disease, seizures, fever, confusion, neurologic symptoms, and others. Patients with severe allergies to penicillin medications are cautioned that there is about a 10% incidence of cross-reactivity with cephalosporins. When possible, patients with penicillin allergies should use alternatives to cephalosporins for antibiotic therapy.
Prednisone Counseling:  I discussed with the patient the risks of prolonged use of prednisone including but not limited to weight gain, insomnia, osteoporosis, mood changes, diabetes, susceptibility to infection, glaucoma and high blood pressure.  In cases where prednisone use is prolonged, patients should be monitored with blood pressure checks, serum glucose levels and an eye exam.  Additionally, the patient may need to be placed on GI prophylaxis, PCP prophylaxis, and calcium and vitamin D supplementation and/or a bisphosphonate.  The patient verbalized understanding of the proper use and the possible adverse effects of prednisone.  All of the patient's questions and concerns were addressed.
Clofazimine Counseling:  I discussed with the patient the risks of clofazimine including but not limited to skin and eye pigmentation, liver damage, nausea/vomiting, gastrointestinal bleeding and allergy.
Methotrexate Pregnancy And Lactation Text: This medication is Pregnancy Category X and is known to cause fetal harm. This medication is excreted in breast milk.
Arava Pregnancy And Lactation Text: This medication is Pregnancy Category X and is absolutely contraindicated during pregnancy. It is unknown if it is excreted in breast milk.
Otezla Counseling: The side effects of Otezla were discussed with the patient, including but not limited to worsening or new depression, weight loss, diarrhea, nausea, upper respiratory tract infection, and headache. Patient instructed to call the office should any adverse effect occur.  The patient verbalized understanding of the proper use and possible adverse effects of Otezla.  All the patient's questions and concerns were addressed.
Azathioprine Counseling:  I discussed with the patient the risks of azathioprine including but not limited to myelosuppression, immunosuppression, hepatotoxicity, lymphoma, and infections.  The patient understands that monitoring is required including baseline LFTs, Creatinine, possible TPMP genotyping and weekly CBCs for the first month and then every 2 weeks thereafter.  The patient verbalized understanding of the proper use and possible adverse effects of azathioprine.  All of the patient's questions and concerns were addressed.
Gabapentin Pregnancy And Lactation Text: This medication is Pregnancy Category C and isn't considered safe during pregnancy. It is excreted in breast milk.
Acitretin Pregnancy And Lactation Text: This medication is Pregnancy Category X and should not be given to women who are pregnant or may become pregnant in the future. This medication is excreted in breast milk.
Thalidomide Counseling: I discussed with the patient the risks of thalidomide including but not limited to birth defects, anxiety, weakness, chest pain, dizziness, cough and severe allergy.
Albendazole Counseling:  I discussed with the patient the risks of albendazole including but not limited to cytopenia, kidney damage, nausea/vomiting and severe allergy.  The patient understands that this medication is being used in an off-label manner.
Metronidazole Pregnancy And Lactation Text: This medication is Pregnancy Category B and considered safe during pregnancy.  It is also excreted in breast milk.
Eucrisa Counseling: Patient may experience a mild burning sensation during topical application. Eucrisa is not approved in children less than 2 years of age.
Protopic Pregnancy And Lactation Text: This medication is Pregnancy Category C. It is unknown if this medication is excreted in breast milk when applied topically.
Glycopyrrolate Counseling:  I discussed with the patient the risks of glycopyrrolate including but not limited to skin rash, drowsiness, dry mouth, difficulty urinating, and blurred vision.
Taltz Pregnancy And Lactation Text: The risk during pregnancy and breastfeeding is uncertain with this medication.
Prednisone Pregnancy And Lactation Text: This medication is Pregnancy Category C and it isn't know if it is safe during pregnancy. This medication is excreted in breast milk.
Oxybutynin Counseling:  I discussed with the patient the risks of oxybutynin including but not limited to skin rash, drowsiness, dry mouth, difficulty urinating, and blurred vision.
Cephalexin Pregnancy And Lactation Text: This medication is Pregnancy Category B and considered safe during pregnancy.  It is also excreted in breast milk but can be used safely for shorter doses.
Fluconazole Pregnancy And Lactation Text: This medication is Pregnancy Category C and it isn't know if it is safe during pregnancy. It is also excreted in breast milk.
Cellcept Counseling:  I discussed with the patient the risks of mycophenolate mofetil including but not limited to infection/immunosuppression, GI upset, hypokalemia, hypercholesterolemia, bone marrow suppression, lymphoproliferative disorders, malignancy, GI ulceration/bleed/perforation, colitis, interstitial lung disease, kidney failure, progressive multifocal leukoencephalopathy, and birth defects.  The patient understands that monitoring is required including a baseline creatinine and regular CBC testing. In addition, patient must alert us immediately if symptoms of infection or other concerning signs are noted.
Include Pregnancy/Lactation Warning?: No
Fluconazole Counseling:  Patient counseled regarding adverse effects of fluconazole including but not limited to headache, diarrhea, nausea, upset stomach, liver function test abnormalities, taste disturbance, and stomach pain.  There is a rare possibility of liver failure that can occur when taking fluconazole.  The patient understands that monitoring of LFTs and kidney function test may be required, especially at baseline. The patient verbalized understanding of the proper use and possible adverse effects of fluconazole.  All of the patient's questions and concerns were addressed.
Otezla Pregnancy And Lactation Text: This medication is Pregnancy Category C and it isn't known if it is safe during pregnancy. It is unknown if it is excreted in breast milk.
Azathioprine Pregnancy And Lactation Text: This medication is Pregnancy Category D and isn't considered safe during pregnancy. It is unknown if this medication is excreted in breast milk.
Bexarotene Counseling:  I discussed with the patient the risks of bexarotene including but not limited to hair loss, dry lips/skin/eyes, liver abnormalities, hyperlipidemia, pancreatitis, depression/suicidal ideation, photosensitivity, drug rash/allergic reactions, hypothyroidism, anemia, leukopenia, infection, cataracts, and teratogenicity.  Patient understands that they will need regular blood tests to check lipid profile, liver function tests, white blood cell count, thyroid function tests and pregnancy test if applicable.
Albendazole Pregnancy And Lactation Text: This medication is Pregnancy Category C and it isn't known if it is safe during pregnancy. It is also excreted in breast milk.
Minocycline Counseling: Patient advised regarding possible photosensitivity and discoloration of the teeth, skin, lips, tongue and gums.  Patient instructed to avoid sunlight, if possible.  When exposed to sunlight, patients should wear protective clothing, sunglasses, and sunscreen.  The patient was instructed to call the office immediately if the following severe adverse effects occur:  hearing changes, easy bruising/bleeding, severe headache, or vision changes.  The patient verbalized understanding of the proper use and possible adverse effects of minocycline.  All of the patient's questions and concerns were addressed.
Eucrisa Pregnancy And Lactation Text: This medication has not been assigned a Pregnancy Risk Category but animal studies failed to show danger with the topical medication. It is unknown if the medication is excreted in breast milk.
Topical Sulfur Applications Counseling: Topical Sulfur Counseling: Patient counseled that this medication may cause skin irritation or allergic reactions.  In the event of skin irritation, the patient was advised to reduce the amount of the drug applied or use it less frequently.   The patient verbalized understanding of the proper use and possible adverse effects of topical sulfur application.  All of the patient's questions and concerns were addressed.
Carac Counseling:  I discussed with the patient the risks of Carac including but not limited to erythema, scaling, itching, weeping, crusting, and pain.
Dupixent Counseling: I discussed with the patient the risks of dupilumab including but not limited to eye infection and irritation, cold sores, injection site reactions, worsening of asthma, allergic reactions and increased risk of parasitic infection.  Live vaccines should be avoided while taking dupilumab. Dupilumab will also interact with certain medications such as warfarin and cyclosporine. The patient understands that monitoring is required and they must alert us or the primary physician if symptoms of infection or other concerning signs are noted.
Colchicine Counseling:  Patient counseled regarding adverse effects including but not limited to stomach upset (nausea, vomiting, stomach pain, or diarrhea).  Patient instructed to limit alcohol consumption while taking this medication.  Colchicine may reduce blood counts especially with prolonged use.  The patient understands that monitoring of kidney function and blood counts may be required, especially at baseline. The patient verbalized understanding of the proper use and possible adverse effects of colchicine.  All of the patient's questions and concerns were addressed.
Clindamycin Counseling: I counseled the patient regarding use of clindamycin as an antibiotic for prophylactic and/or therapeutic purposes. Clindamycin is active against numerous classes of bacteria, including skin bacteria. Side effects may include nausea, diarrhea, gastrointestinal upset, rash, hives, yeast infections, and in rare cases, colitis.
Carac Pregnancy And Lactation Text: This medication is Pregnancy Category X and contraindicated in pregnancy and in women who may become pregnant. It is unknown if this medication is excreted in breast milk.
Griseofulvin Counseling:  I discussed with the patient the risks of griseofulvin including but not limited to photosensitivity, cytopenia, liver damage, nausea/vomiting and severe allergy.  The patient understands that this medication is best absorbed when taken with a fatty meal (e.g., ice cream or french fries).
Solaraze Counseling:  I discussed with the patient the risks of Solaraze including but not limited to erythema, scaling, itching, weeping, crusting, and pain.
Isotretinoin Counseling: Patient should get monthly blood tests, not donate blood, not drive at night if vision affected, not share medication, and not undergo elective surgery for 6 months after tx completed. Side effects reviewed, pt to contact office should one occur.
Valtrex Pregnancy And Lactation Text: this medication is Pregnancy Category B and is considered safe during pregnancy. This medication is not directly found in breast milk but it's metabolite acyclovir is present.
Quinolones Counseling:  I discussed with the patient the risks of fluoroquinolones including but not limited to GI upset, allergic reaction, drug rash, diarrhea, dizziness, photosensitivity, yeast infections, liver function test abnormalities, tendonitis/tendon rupture.
Rituxan Pregnancy And Lactation Text: This medication is Pregnancy Category C and it isn't know if it is safe during pregnancy. It is unknown if this medication is excreted in breast milk but similar antibodies are known to be excreted.
Valtrex Counseling: I discussed with the patient the risks of valacyclovir including but not limited to kidney damage, nausea, vomiting and severe allergy.  The patient understands that if the infection seems to be worsening or is not improving, they are to call.
Ivermectin Counseling:  Patient instructed to take medication on an empty stomach with a full glass of water.  Patient informed of potential adverse effects including but not limited to nausea, diarrhea, dizziness, itching, and swelling of the extremities or lymph nodes.  The patient verbalized understanding of the proper use and possible adverse effects of ivermectin.  All of the patient's questions and concerns were addressed.
Minocycline Pregnancy And Lactation Text: This medication is Pregnancy Category D and not consider safe during pregnancy. It is also excreted in breast milk.
Bexarotene Pregnancy And Lactation Text: This medication is Pregnancy Category X and should not be given to women who are pregnant or may become pregnant. This medication should not be used if you are breast feeding.
Rituxan Counseling:  I discussed with the patient the risks of Rituxan infusions. Side effects can include infusion reactions, severe drug rashes including mucocutaneous reactions, reactivation of latent hepatitis and other infections and rarely progressive multifocal leukoencephalopathy.  All of the patient's questions and concerns were addressed.
Hydroquinone Counseling:  Patient advised that medication may result in skin irritation, lightening (hypopigmentation), dryness, and burning.  In the event of skin irritation, the patient was advised to reduce the amount of the drug applied or use it less frequently.  Rarely, spots that are treated with hydroquinone can become darker (pseudoochronosis).  Should this occur, patient instructed to stop medication and call the office. The patient verbalized understanding of the proper use and possible adverse effects of hydroquinone.  All of the patient's questions and concerns were addressed.
Cimetidine Counseling:  I discussed with the patient the risks of Cimetidine including but not limited to gynecomastia, headache, diarrhea, nausea, drowsiness, arrhythmias, pancreatitis, skin rashes, psychosis, bone marrow suppression and kidney toxicity.
Dupixent Pregnancy And Lactation Text: This medication likely crosses the placenta but the risk for the fetus is uncertain. This medication is excreted in breast milk.
Topical Sulfur Applications Pregnancy And Lactation Text: This medication is Pregnancy Category C and has an unknown safety profile during pregnancy. It is unknown if this topical medication is excreted in breast milk.
Glycopyrrolate Pregnancy And Lactation Text: This medication is Pregnancy Category B and is considered safe during pregnancy. It is unknown if it is excreted breast milk.
Tremfya Counseling: I discussed with the patient the risks of guselkumab including but not limited to immunosuppression, serious infections, worsening of inflammatory bowel disease and drug reactions.  The patient understands that monitoring is required including a PPD at baseline and must alert us or the primary physician if symptoms of infection or other concerning signs are noted.
5-Fu Counseling: 5-Fluorouracil Counseling:  I discussed with the patient the risks of 5-fluorouracil including but not limited to erythema, scaling, itching, weeping, crusting, and pain.
Griseofulvin Pregnancy And Lactation Text: This medication is Pregnancy Category X and is known to cause serious birth defects. It is unknown if this medication is excreted in breast milk but breast feeding should be avoided.
Birth Control Pills Counseling: Birth Control Pill Counseling: I discussed with the patient the potential side effects of OCPs including but not limited to increased risk of stroke, heart attack, thrombophlebitis, deep venous thrombosis, hepatic adenomas, breast changes, GI upset, headaches, and depression.  The patient verbalized understanding of the proper use and possible adverse effects of OCPs. All of the patient's questions and concerns were addressed.
Isotretinoin Pregnancy And Lactation Text: This medication is Pregnancy Category X and is considered extremely dangerous during pregnancy. It is unknown if it is excreted in breast milk.
Solaraze Pregnancy And Lactation Text: This medication is Pregnancy Category B and is considered safe. There is some data to suggest avoiding during the third trimester. It is unknown if this medication is excreted in breast milk.
Siliq Counseling:  I discussed with the patient the risks of Siliq including but not limited to new or worsening depression, suicidal thoughts and behavior, immunosuppression, malignancy, posterior leukoencephalopathy syndrome, and serious infections.  The patient understands that monitoring is required including a PPD at baseline and must alert us or the primary physician if symptoms of infection or other concerning signs are noted. There is also a special program designed to monitor depression which is required with Siliq.
Imiquimod Counseling:  I discussed with the patient the risks of imiquimod including but not limited to erythema, scaling, itching, weeping, crusting, and pain.  Patient understands that the inflammatory response to imiquimod is variable from person to person and was educated regarded proper titration schedule.  If flu-like symptoms develop, patient knows to discontinue the medication and contact us.
Doxepin Counseling:  Patient advised that the medication is sedating and not to drive a car after taking this medication. Patient informed of potential adverse effects including but not limited to dry mouth, urinary retention, and blurry vision.  The patient verbalized understanding of the proper use and possible adverse effects of doxepin.  All of the patient's questions and concerns were addressed.
Cyclophosphamide Counseling:  I discussed with the patient the risks of cyclophosphamide including but not limited to hair loss, hormonal abnormalities, decreased fertility, abdominal pain, diarrhea, nausea and vomiting, bone marrow suppression and infection. The patient understands that monitoring is required while taking this medication.
Wartpeel Counseling:  I discussed with the patient the risks of Wartpeel including but not limited to erythema, scaling, itching, weeping, crusting, and pain.
Hydroxychloroquine Counseling:  I discussed with the patient that a baseline ophthalmologic exam is needed at the start of therapy and every year thereafter while on therapy. A CBC may also be warranted for monitoring.  The side effects of this medication were discussed with the patient, including but not limited to agranulocytosis, aplastic anemia, seizures, rashes, retinopathy, and liver toxicity. Patient instructed to call the office should any adverse effect occur.  The patient verbalized understanding of the proper use and possible adverse effects of Plaquenil.  All the patient's questions and concerns were addressed.
Enbrel Counseling:  I discussed with the patient the risks of etanercept including but not limited to myelosuppression, immunosuppression, autoimmune hepatitis, demyelinating diseases, lymphoma, and infections.  The patient understands that monitoring is required including a PPD at baseline and must alert us or the primary physician if symptoms of infection or other concerning signs are noted.
Clindamycin Pregnancy And Lactation Text: This medication can be used in pregnancy if certain situations. Clindamycin is also present in breast milk.
Rifampin Counseling: I discussed with the patient the risks of rifampin including but not limited to liver damage, kidney damage, red-orange body fluids, nausea/vomiting and severe allergy.
High Dose Vitamin A Counseling: Side effects reviewed, pt to contact office should one occur.
Doxepin Pregnancy And Lactation Text: This medication is Pregnancy Category C and it isn't known if it is safe during pregnancy. It is also excreted in breast milk and breast feeding isn't recommended.
Imiquimod Pregnancy And Lactation Text: This medication is Pregnancy Category C. It is unknown if this medication is excreted in breast milk.
Humira Counseling:  I discussed with the patient the risks of adalimumab including but not limited to myelosuppression, immunosuppression, autoimmune hepatitis, demyelinating diseases, lymphoma, and serious infections.  The patient understands that monitoring is required including a PPD at baseline and must alert us or the primary physician if symptoms of infection or other concerning signs are noted.
Cyclophosphamide Pregnancy And Lactation Text: This medication is Pregnancy Category D and it isn't considered safe during pregnancy. This medication is excreted in breast milk.
Zyclara Counseling:  I discussed with the patient the risks of imiquimod including but not limited to erythema, scaling, itching, weeping, crusting, and pain.  Patient understands that the inflammatory response to imiquimod is variable from person to person and was educated regarded proper titration schedule.  If flu-like symptoms develop, patient knows to discontinue the medication and contact us.
Xelemaniz Pregnancy And Lactation Text: This medication is Pregnancy Category D and is not considered safe during pregnancy.  The risk during breast feeding is also uncertain.
Nsaids Counseling: NSAID Counseling: I discussed with the patient that NSAIDs should be taken with food. Prolonged use of NSAIDs can result in the development of stomach ulcers.  Patient advised to stop taking NSAIDs if abdominal pain occurs.  The patient verbalized understanding of the proper use and possible adverse effects of NSAIDs.  All of the patient's questions and concerns were addressed.
Hydroxychloroquine Pregnancy And Lactation Text: This medication has been shown to cause fetal harm but it isn't assigned a Pregnancy Risk Category. There are small amounts excreted in breast milk.
Xeljanz Counseling: I discussed with the patient the risks of Xeljanz therapy including increased risk of infection, liver issues, headache, diarrhea, or cold symptoms. Live vaccines should be avoided. They were instructed to call if they have any problems.
Dapsone Counseling: I discussed with the patient the risks of dapsone including but not limited to hemolytic anemia, agranulocytosis, rashes, methemoglobinemia, kidney failure, peripheral neuropathy, headaches, GI upset, and liver toxicity.  Patients who start dapsone require monitoring including baseline LFTs and weekly CBCs for the first month, then every month thereafter.  The patient verbalized understanding of the proper use and possible adverse effects of dapsone.  All of the patient's questions and concerns were addressed.
Doxycycline Counseling:  Patient counseled regarding possible photosensitivity and increased risk for sunburn.  Patient instructed to avoid sunlight, if possible.  When exposed to sunlight, patients should wear protective clothing, sunglasses, and sunscreen.  The patient was instructed to call the office immediately if the following severe adverse effects occur:  hearing changes, easy bruising/bleeding, severe headache, or vision changes.  The patient verbalized understanding of the proper use and possible adverse effects of doxycycline.  All of the patient's questions and concerns were addressed.
Birth Control Pills Pregnancy And Lactation Text: This medication should be avoided if pregnant and for the first 30 days post-partum.
Itraconazole Counseling:  I discussed with the patient the risks of itraconazole including but not limited to liver damage, nausea/vomiting, neuropathy, and severe allergy.  The patient understands that this medication is best absorbed when taken with acidic beverages such as non-diet cola or ginger ale.  The patient understands that monitoring is required including baseline LFTs and repeat LFTs at intervals.  The patient understands that they are to contact us or the primary physician if concerning signs are noted.
Topical Retinoid counseling:  Patient advised to apply a pea-sized amount only at bedtime and wait 30 minutes after washing their face before applying.  If too drying, patient may add a non-comedogenic moisturizer. The patient verbalized understanding of the proper use and possible adverse effects of retinoids.  All of the patient's questions and concerns were addressed.
Hydroxyzine Counseling: Patient advised that the medication is sedating and not to drive a car after taking this medication.  Patient informed of potential adverse effects including but not limited to dry mouth, urinary retention, and blurry vision.  The patient verbalized understanding of the proper use and possible adverse effects of hydroxyzine.  All of the patient's questions and concerns were addressed.
Cyclosporine Counseling:  I discussed with the patient the risks of cyclosporine including but not limited to hypertension, gingival hyperplasia,myelosuppression, immunosuppression, liver damage, kidney damage, neurotoxicity, lymphoma, and serious infections. The patient understands that monitoring is required including baseline blood pressure, CBC, CMP, lipid panel and uric acid, and then 1-2 times monthly CMP and blood pressure.
Nsaids Pregnancy And Lactation Text: These medications are considered safe up to 30 weeks gestation. It is excreted in breast milk.
Erivedge Counseling- I discussed with the patient the risks of Erivedge including but not limited to nausea, vomiting, diarrhea, constipation, weight loss, changes in the sense of taste, decreased appetite, muscle spasms, and hair loss.  The patient verbalized understanding of the proper use and possible adverse effects of Erivedge.  All of the patient's questions and concerns were addressed.
Xolair Counseling:  Patient informed of potential adverse effects including but not limited to fever, muscle aches, rash and allergic reactions.  The patient verbalized understanding of the proper use and possible adverse effects of Xolair.  All of the patient's questions and concerns were addressed.
Spironolactone Pregnancy And Lactation Text: This medication can cause feminization of the male fetus and should be avoided during pregnancy. The active metabolite is also found in breast milk.
Erythromycin Counseling:  I discussed with the patient the risks of erythromycin including but not limited to GI upset, allergic reaction, drug rash, diarrhea, increase in liver enzymes, and yeast infections.
Dapsone Pregnancy And Lactation Text: This medication is Pregnancy Category C and is not considered safe during pregnancy or breast feeding.
Doxycycline Pregnancy And Lactation Text: This medication is Pregnancy Category D and not consider safe during pregnancy. It is also excreted in breast milk but is considered safe for shorter treatment courses.
Spironolactone Counseling: Patient advised regarding risks of diarrhea, abdominal pain, hyperkalemia, birth defects (for female patients), liver toxicity and renal toxicity. The patient may need blood work to monitor liver and kidney function and potassium levels while on therapy. The patient verbalized understanding of the proper use and possible adverse effects of spironolactone.  All of the patient's questions and concerns were addressed.
Drysol Counseling:  I discussed with the patient the risks of drysol/aluminum chloride including but not limited to skin rash, itching, irritation, burning.
High Dose Vitamin A Pregnancy And Lactation Text: High dose vitamin A therapy is contraindicated during pregnancy and breast feeding.
Azithromycin Counseling:  I discussed with the patient the risks of azithromycin including but not limited to GI upset, allergic reaction, drug rash, diarrhea, and yeast infections.
Rifampin Pregnancy And Lactation Text: This medication is Pregnancy Category C and it isn't know if it is safe during pregnancy. It is also excreted in breast milk and should not be used if you are breast feeding.
Minoxidil Counseling: Minoxidil is a topical medication which can increase blood flow where it is applied. It is uncertain how this medication increases hair growth. Side effects are uncommon and include stinging and allergic reactions.
Simponi Counseling:  I discussed with the patient the risks of golimumab including but not limited to myelosuppression, immunosuppression, autoimmune hepatitis, demyelinating diseases, lymphoma, and serious infections.  The patient understands that monitoring is required including a PPD at baseline and must alert us or the primary physician if symptoms of infection or other concerning signs are noted.
Azithromycin Pregnancy And Lactation Text: This medication is considered safe during pregnancy and is also secreted in breast milk.
Odomzo Counseling- I discussed with the patient the risks of Odomzo including but not limited to nausea, vomiting, diarrhea, constipation, weight loss, changes in the sense of taste, decreased appetite, muscle spasms, and hair loss.  The patient verbalized understanding of the proper use and possible adverse effects of Odomzo.  All of the patient's questions and concerns were addressed.
Xolair Pregnancy And Lactation Text: This medication is Pregnancy Category B and is considered safe during pregnancy. This medication is excreted in breast milk.
Erythromycin Pregnancy And Lactation Text: This medication is Pregnancy Category B and is considered safe during pregnancy. It is also excreted in breast milk.
Elidel Counseling: Patient may experience a mild burning sensation during topical application. Elidel is not approved in children less than 2 years of age. There have been case reports of hematologic and skin malignancies in patients using topical calcineurin inhibitors although causality is questionable.
Ketoconazole Pregnancy And Lactation Text: This medication is Pregnancy Category C and it isn't know if it is safe during pregnancy. It is also excreted in breast milk and breast feeding isn't recommended.
Tazorac Pregnancy And Lactation Text: This medication is not safe during pregnancy. It is unknown if this medication is excreted in breast milk.
SSKI Counseling:  I discussed with the patient the risks of SSKI including but not limited to thyroid abnormalities, metallic taste, GI upset, fever, headache, acne, arthralgias, paraesthesias, lymphadenopathy, easy bleeding, arrhythmias, and allergic reaction.
Ketoconazole Counseling:   Patient counseled regarding improving absorption with orange juice.  Adverse effects include but are not limited to breast enlargement, headache, diarrhea, nausea, upset stomach, liver function test abnormalities, taste disturbance, and stomach pain.  There is a rare possibility of liver failure that can occur when taking ketoconazole. The patient understands that monitoring of LFTs may be required, especially at baseline. The patient verbalized understanding of the proper use and possible adverse effects of ketoconazole.  All of the patient's questions and concerns were addressed.
Drysol Pregnancy And Lactation Text: This medication is considered safe during pregnancy and breast feeding.
Cimzia Counseling:  I discussed with the patient the risks of Cimzia including but not limited to immunosuppression, allergic reactions and infections.  The patient understands that monitoring is required including a PPD at baseline and must alert us or the primary physician if symptoms of infection or other concerning signs are noted.
Tetracycline Counseling: Patient counseled regarding possible photosensitivity and increased risk for sunburn.  Patient instructed to avoid sunlight, if possible.  When exposed to sunlight, patients should wear protective clothing, sunglasses, and sunscreen.  The patient was instructed to call the office immediately if the following severe adverse effects occur:  hearing changes, easy bruising/bleeding, severe headache, or vision changes.  The patient verbalized understanding of the proper use and possible adverse effects of tetracycline.  All of the patient's questions and concerns were addressed. Patient understands to avoid pregnancy while on therapy due to potential birth defects.
Tazorac Counseling:  Patient advised that medication is irritating and drying.  Patient may need to apply sparingly and wash off after an hour before eventually leaving it on overnight.  The patient verbalized understanding of the proper use and possible adverse effects of tazorac.  All of the patient's questions and concerns were addressed.
Hydroxyzine Pregnancy And Lactation Text: This medication is not safe during pregnancy and should not be taken. It is also excreted in breast milk and breast feeding isn't recommended.
Ilumya Counseling: I discussed with the patient the risks of tildrakizumab including but not limited to immunosuppression, malignancy, posterior leukoencephalopathy syndrome, and serious infections.  The patient understands that monitoring is required including a PPD at baseline and must alert us or the primary physician if symptoms of infection or other concerning signs are noted.
Sski Pregnancy And Lactation Text: This medication is Pregnancy Category D and isn't considered safe during pregnancy. It is excreted in breast milk.
Metronidazole Counseling:  I discussed with the patient the risks of metronidazole including but not limited to seizures, nausea/vomiting, a metallic taste in the mouth, nausea/vomiting and severe allergy.
Acitretin Counseling:  I discussed with the patient the risks of acitretin including but not limited to hair loss, dry lips/skin/eyes, liver damage, hyperlipidemia, depression/suicidal ideation, photosensitivity.  Serious rare side effects can include but are not limited to pancreatitis, pseudotumor cerebri, bony changes, clot formation/stroke/heart attack.  Patient understands that alcohol is contraindicated since it can result in liver toxicity and significantly prolong the elimination of the drug by many years.
Gabapentin Counseling: I discussed with the patient the risks of gabapentin including but not limited to dizziness, somnolence, fatigue and ataxia.
Bactrim Counseling:  I discussed with the patient the risks of sulfa antibiotics including but not limited to GI upset, allergic reaction, drug rash, diarrhea, dizziness, photosensitivity, and yeast infections.  Rarely, more serious reactions can occur including but not limited to aplastic anemia, agranulocytosis, methemoglobinemia, blood dyscrasias, liver or kidney failure, lung infiltrates or desquamative/blistering drug rashes.
Terbinafine Counseling: Patient counseling regarding adverse effects of terbinafine including but not limited to headache, diarrhea, rash, upset stomach, liver function test abnormalities, itching, taste/smell disturbance, nausea, abdominal pain, and flatulence.  There is a rare possibility of liver failure that can occur when taking terbinafine.  The patient understands that a baseline LFT and kidney function test may be required. The patient verbalized understanding of the proper use and possible adverse effects of terbinafine.  All of the patient's questions and concerns were addressed.
Benzoyl Peroxide Counseling: Patient counseled that medicine may cause skin irritation and bleach clothing.  In the event of skin irritation, the patient was advised to reduce the amount of the drug applied or use it less frequently.   The patient verbalized understanding of the proper use and possible adverse effects of benzoyl peroxide.  All of the patient's questions and concerns were addressed.
Topical Clindamycin Counseling: Patient counseled that this medication may cause skin irritation or allergic reactions.  In the event of skin irritation, the patient was advised to reduce the amount of the drug applied or use it less frequently.   The patient verbalized understanding of the proper use and possible adverse effects of clindamycin.  All of the patient's questions and concerns were addressed.
Cosentyx Counseling:  I discussed with the patient the risks of Cosentyx including but not limited to worsening of Crohn's disease, immunosuppression, allergic reactions and infections.  The patient understands that monitoring is required including a PPD at baseline and must alert us or the primary physician if symptoms of infection or other concerning signs are noted.
Cimzia Pregnancy And Lactation Text: This medication crosses the placenta but can be considered safe in certain situations. Cimzia may be excreted in breast milk.
Picato Counseling:  I discussed with the patient the risks of Picato including but not limited to erythema, scaling, itching, weeping, crusting, and pain.
Arava Counseling:  Patient counseled regarding adverse effects of Arava including but not limited to nausea, vomiting, abnormalities in liver function tests. Patients may develop mouth sores, rash, diarrhea, and abnormalities in blood counts. The patient understands that monitoring is required including LFTs and blood counts.  There is a rare possibility of scarring of the liver and lung problems that can occur when taking methotrexate. Persistent nausea, loss of appetite, pale stools, dark urine, cough, and shortness of breath should be reported immediately. Patient advised to discontinue Arava treatment and consult with a physician prior to attempting conception. The patient will have to undergo a treatment to eliminate Arava from the body prior to conception.
Stelara Counseling:  I discussed with the patient the risks of ustekinumab including but not limited to immunosuppression, malignancy, posterior leukoencephalopathy syndrome, and serious infections.  The patient understands that monitoring is required including a PPD at baseline and must alert us or the primary physician if symptoms of infection or other concerning signs are noted.
Methotrexate Counseling:  Patient counseled regarding adverse effects of methotrexate including but not limited to nausea, vomiting, abnormalities in liver function tests. Patients may develop mouth sores, rash, diarrhea, and abnormalities in blood counts. The patient understands that monitoring is required including LFT's and blood counts.  There is a rare possibility of scarring of the liver and lung problems that can occur when taking methotrexate. Persistent nausea, loss of appetite, pale stools, dark urine, cough, and shortness of breath should be reported immediately. Patient advised to discontinue methotrexate treatment at least three months before attempting to become pregnant.  I discussed the need for folate supplements while taking methotrexate.  These supplements can decrease side effects during methotrexate treatment. The patient verbalized understanding of the proper use and possible adverse effects of methotrexate.  All of the patient's questions and concerns were addressed.

## 2019-01-30 LAB — VIT B12 SERPL-MCNC: 468 PG/ML (ref 232–1245)

## 2019-04-29 ENCOUNTER — OFFICE VISIT (OUTPATIENT)
Dept: NEUROLOGY | Facility: MEDICAL CENTER | Age: 79
End: 2019-04-29
Payer: MEDICARE

## 2019-04-29 VITALS
BODY MASS INDEX: 28.89 KG/M2 | HEART RATE: 86 BPM | OXYGEN SATURATION: 95 % | WEIGHT: 153 LBS | SYSTOLIC BLOOD PRESSURE: 126 MMHG | DIASTOLIC BLOOD PRESSURE: 78 MMHG | HEIGHT: 61 IN | TEMPERATURE: 97.3 F

## 2019-04-29 DIAGNOSIS — G31.84 MILD COGNITIVE IMPAIRMENT: ICD-10-CM

## 2019-04-29 PROCEDURE — 99213 OFFICE O/P EST LOW 20 MIN: CPT | Performed by: PSYCHIATRY & NEUROLOGY

## 2019-04-29 RX ORDER — DONEPEZIL HYDROCHLORIDE 10 MG/1
10 TABLET, FILM COATED ORAL EVERY EVENING
Qty: 90 TAB | Refills: 1 | Status: SHIPPED | OUTPATIENT
Start: 2019-04-29 | End: 2019-10-22 | Stop reason: SDUPTHER

## 2019-04-29 RX ORDER — DONEPEZIL HYDROCHLORIDE 5 MG/1
10 TABLET, FILM COATED ORAL NIGHTLY
COMMUNITY
End: 2019-04-29 | Stop reason: SDUPTHER

## 2019-04-29 NOTE — PROGRESS NOTES
CC: Cognitive Impairment      HPI:    Kaila Choudhary is a very pleasant 78 y.o. female with past medical history of anxiety, panic attacks, hypothyroidism, depression, and brain aneurysm status post coiling 2009, who presents today in outpatient neurologic follow up for cognitive/memory impairment. She is accompanied by her daughter and son to today's visit.     According to her children, Ms. Choudhary has remained overall clinically stable. She still has some short term memory loss. They do not voice any concerns regarding her ability to care for herself. At this time she is independent of her activities of daily living. She is able to drive short distances (I.e. The grocery store, [employer], relatives homes) without getting lost. There have been no concerns regarding her reaction time when driving and she follows a GPS. She is going to have a cataract removal surgery soon and her eye doctor cleared her to drive for this reason.     Ms. Choudhary has been taking Donepezil 10mg at bedtime and denies any side effects from the medication.     Onset: Symptoms began in 2016    Interval:   Left eye blurry vision.       Associated Symptoms:   Hallucinations: No  Tremors: No   Sleep disturbance: No  Seizures: No  Language problems: No  Personality changes: No      ROS:   Constitutional: No fevers or chills.  Eyes: No blurry vision or eye pain.  ENT: No dysphagia or hearing loss.  Respiratory: No cough or shortness of breath.  Cardiovascular: No chest pain or palpitations.  GI: No nausea, vomiting, or diarrhea.  : No urinary incontinence or dysuria.  Musculoskeletal: No joint swelling or arthralgias.  Skin: No skin rashes.  Neuro: No headaches, dizziness, or tremors.  Endocrine: No heat or cold intolerance. No polydipsia or polyuria.  Psych: + depression, no anxiety.  Heme/Lymph: No easy bruising or swollen lymph nodes.  All other review of systems were reviewed and were negative.     Past Medical History:   Past  "Medical History:   Diagnosis Date   • Skin infection 6/25/2014   • OSTEOPOROSIS 2/13/2013   • Hypothyroidism 2/13/2013   • Cancer (HCC) 2013    basal cell head   • Arthritis     L hip   • CATARACT     R eye repaired   • Cerebral aneurysm    • Cholesterol blood decreased    • Dental disorder     front upper veneers   • Hyperlipidemia    • Hypertension    • Pain     L hip is \"nagging\"   • S/P cardiac catheterization    • Thyroid disease        Past Surgical History:   Past Surgical History:   Procedure Laterality Date   • RECOVERY  3/21/2016    Procedure: CATH LAB [facility] WITH POSSIBLE WIEDENBECK ;  Surgeon: Recoveryonly Surgery;  Location: SURGERY PRE-POST PROC UNIT [facility];  Service:    • RECOVERY  7/24/2014    Performed by [facility] Surgery at SURGERY SAME DAY [facility] ORS   • RECOVERY  10/16/2013    Performed by Ir-Recovery Surgery at SURGERY [facility] ORS   • OTHER  2004    R cataract-IOL   • SHOULDER SURGERY  1998   • GYN SURGERY  1982    hysterectomy   • TONSILLECTOMY         Social History:   Social History     Social History   • Marital status:      Spouse name: N/A   • Number of children: N/A   • Years of education: N/A     Occupational History   • Not on file.     Social History Main Topics   • Smoking status: Never Smoker   • Smokeless tobacco: Never Used   • Alcohol use No   • Drug use: No   • Sexual activity: Not on file     Other Topics Concern   • Not on file     Social History Narrative   • No narrative on file       Family Hx:   Family History   Problem Relation Age of Onset   • Heart Disease Mother    • Heart Disease Father        Current Medications:   Current Outpatient Prescriptions:   •  donepezil (ARICEPT) 10 MG tablet, Take 1 Tab by mouth every evening for 180 days., Disp: 90 Tab, Rfl: 1  •  escitalopram (LEXAPRO) 5 MG tablet, Take 10 mg by mouth every day., Disp: , Rfl:   •  Calcium Carbonate-Vitamin D (CALCIUM 600/VITAMIN D PO), Take  by mouth., Disp: , Rfl:   •  levothyroxine (SYNTHROID) " "88 MCG Tab, Take 1 tablet by mouth  every morning on an empty  stomach (Patient taking differently: Take 50 mcg by mouth  every morning on an empty  stomach), Disp: 100 Tab, Rfl: 3  •  atorvastatin (LIPITOR) 40 MG Tab, Take 1 Tab by mouth every day., Disp: 90 Tab, Rfl: 3  •  aspirin (ASA) 81 MG CHEW, Take 81 mg by mouth every morning., Disp: , Rfl:     Allergies:   Allergies   Allergen Reactions   • Sulfa Drugs Rash and Swelling     Anasarca-especially face   • Latex Rash and Itching   • Tape Rash     Latex tape  (paper tape OK)         Physical Exam:   Ambulatory Vitals  Encounter Vitals  Temperature: 36.3 °C (97.3 °F)  Temp src: Temporal  Blood Pressure : 126/78  Pulse: 86  Pulse Oximetry: 95 %  Weight: 69.4 kg (153 lb)  Height: 154.9 cm (5' 1\")  BMI (Calculated): 28.91    Constitutional: Well-developed, well-nourished, good hygiene. Appears stated age.  Respiratory: Normal respiratory effort.  Skin: Warm, dry, intact. No rashes observed.  Eyes: Right eyelid ptosis.   Neurologic:   Mental Status: Awake, alert.    Speech: Fluent with normal prosody.   Memory: Knows the month and year, but not the date or day of the week. She correctly identified the hospital and city. She was able to draw a clock and set the time appropriately.    Concentration: Attentive. Able to focus on history and follow multi-step commands.   Fund of Knowledge: Appropriate.   Cranial Nerves:    CN II: PERRL. No afferent pupillary defect.    CN III, IV, VI: Right eyelid ptosis, right eye with restricted movement.    CN V: Facial sensation intact and symmetric.     CN VII: No facial asymmetry.     CN VIII: Hearing intact.     CN IX and X: Palate elevates symmetrically. Normal gag reflex.    CN XI: Symmetric shoulder shrug.     CN XII: Tongue midline.    Sensory: Intact light touch, vibration and temperature.    Coordination: No evidence of past-pointing on finger to nose testing, no dysdiadochokinesia. Heel to shin intact.    DTR's: 2+ " throughout without clonus.    Babinski: Toes downgoing bilaterally.   Movements: Head tremor.   Musculoskeletal:    Strength: 5/5 in upper and lower extremities bilaterally.   Gait: Steady, narrow based. Cautious with turning, no festination.     Tone: Normal bulk and tone.   Joints: No swelling.     Assessment/Plan:    Mild cognitive impairment  Ms. Choudhary is a 77 yo F with pmhx of brain aneurysm s/p coiling procedure, pacemaker due to complete heart block, osteopenia, hypothyroidism and adjustment disorder who has been noted by family to have short term memory loss since 2016. Brain MRI from 2016 showed generalized atrophy although the bilateral hippocampi and temporal lobes looked disproportionately atrophied. She continues to maintain independence of her activities of daily living. After interviewing her daughter and son, they have driven with Ms. Choudhary and have no concerns over her ability to drive safely. If any issues arise, I recommended  re-testing. We discussed potentially following up with Dr. Saba for her memory issues and she is agreeable to do so.    Plan:  1. Continue Donepezil 10mg at bedtime. Refill provided today.   2. Filled out DMV paperwork allowing her to drive.   3. Can follow up with Dr. Saba in 6 months.        Jie Meehan D.O., M.P.H  MS specialist.   Board Certified Neurologist.  Neurology Clerkship Director, [facility].    Neurology,  [facility].   Tel: [phone]  Fax: [phone]

## 2019-04-29 NOTE — ASSESSMENT & PLAN NOTE
Ms. Choudhary is a 79 yo F with pmhx of brain aneurysm s/p coiling procedure, pacemaker due to complete heart block, osteopenia, hypothyroidism and adjustment disorder who has been noted by family to have short term memory loss since 2016. Brain MRI from 2016 showed generalized atrophy although the bilateral hippocampi and temporal lobes looked disproportionately atrophied. She continues to maintain independence of her activities of daily living. After interviewing her daughter and son, they have driven with Ms. Choudhary and have no concerns over her ability to drive safely. If any issues arise, I recommended  re-testing. We discussed potentially following up with Dr. Saba for her memory issues and she is agreeable to do so.    Plan:  1. Continue Donepezil 10mg at bedtime. Refill provided today.   2. Filled out DMV paperwork allowing her to drive.   3. Can follow up with Dr. Saba in 6 months.

## 2019-06-11 ENCOUNTER — OFFICE VISIT (OUTPATIENT)
Dept: CARDIOLOGY | Facility: MEDICAL CENTER | Age: 79
End: 2019-06-11
Payer: MEDICARE

## 2019-06-11 ENCOUNTER — NON-PROVIDER VISIT (OUTPATIENT)
Dept: CARDIOLOGY | Facility: MEDICAL CENTER | Age: 79
End: 2019-06-11
Payer: MEDICARE

## 2019-06-11 VITALS
WEIGHT: 158 LBS | BODY MASS INDEX: 29.83 KG/M2 | OXYGEN SATURATION: 97 % | HEART RATE: 60 BPM | SYSTOLIC BLOOD PRESSURE: 126 MMHG | HEIGHT: 61 IN | DIASTOLIC BLOOD PRESSURE: 54 MMHG

## 2019-06-11 DIAGNOSIS — I44.2 COMPLETE HEART BLOCK (HCC): ICD-10-CM

## 2019-06-11 DIAGNOSIS — E78.5 HYPERLIPIDEMIA WITH TARGET LDL LESS THAN 100: ICD-10-CM

## 2019-06-11 DIAGNOSIS — I72.0 CAROTID ARTERY ANEURYSM (HCC): ICD-10-CM

## 2019-06-11 DIAGNOSIS — Z95.0 CARDIAC PACEMAKER IN SITU: ICD-10-CM

## 2019-06-11 DIAGNOSIS — Z98.890 S/P CARDIAC CATHETERIZATION: Chronic | ICD-10-CM

## 2019-06-11 PROCEDURE — 93280 PM DEVICE PROGR EVAL DUAL: CPT | Performed by: INTERNAL MEDICINE

## 2019-06-11 PROCEDURE — 99214 OFFICE O/P EST MOD 30 MIN: CPT | Mod: 25 | Performed by: INTERNAL MEDICINE

## 2019-06-11 RX ORDER — LEVOTHYROXINE SODIUM 0.05 MG/1
50 TABLET ORAL
COMMUNITY
End: 2020-01-23

## 2019-06-11 ASSESSMENT — ENCOUNTER SYMPTOMS
WHEEZING: 0
NAUSEA: 0
COUGH: 0
MEMORY LOSS: 1
NERVOUS/ANXIOUS: 0
INSOMNIA: 0
EYE PAIN: 0
BRUISES/BLEEDS EASILY: 0
DEPRESSION: 0
CHILLS: 0
FEVER: 0
EYE DISCHARGE: 0
ABDOMINAL PAIN: 0
PND: 0
LOSS OF CONSCIOUSNESS: 0
VOMITING: 0
BLURRED VISION: 0
SHORTNESS OF BREATH: 0
FALLS: 0
MYALGIAS: 0
DIZZINESS: 0
PALPITATIONS: 0

## 2019-06-11 NOTE — PROGRESS NOTES
"Chief Complaint   Patient presents with   • Coronary Artery Disease     follow up       Subjective:   Kaila Choudhary is a 79 y.o. female who presents today in follow-up in regards to her atypical chest pain with only mild coronary disease noted on angiogram 2016 as well as pacemaker for heart block    In with her daughter, still misses her  of 58 years.  Compliant on medications see neurology for cognitive decline.  No falls or setbacks    Past Medical History:   Diagnosis Date   • Arthritis     L hip   • Cancer (HCC) 2013    basal cell head   • CATARACT     R eye repaired   • Cerebral aneurysm    • Cholesterol blood decreased    • Dental disorder     front upper veneers   • Hyperlipidemia    • Hypertension    • Hypothyroidism 2/13/2013   • OSTEOPOROSIS 2/13/2013   • Pain     L hip is \"nagging\"   • S/P cardiac catheterization    • Skin infection 6/25/2014   • Thyroid disease      Past Surgical History:   Procedure Laterality Date   • RECOVERY  3/21/2016    Procedure: CATH LAB [facility] WITH POSSIBLE WIEDENBECK ;  Surgeon: Recoveryonly Surgery;  Location: SURGERY PRE-POST PROC UNIT [facility];  Service:    • RECOVERY  7/24/2014    Performed by Ir[facility]Recovery Surgery at SURGERY SAME DAY [facility] ORS   • RECOVERY  10/16/2013    Performed by Ir-Recovery Surgery at SURGERY [facility] ORS   • OTHER  2004    R cataract-IOL   • SHOULDER SURGERY  1998   • GYN SURGERY  1982    hysterectomy   • TONSILLECTOMY       Family History   Problem Relation Age of Onset   • Heart Disease Mother    • Heart Disease Father      Social History     Social History   • Marital status:      Spouse name: N/A   • Number of children: N/A   • Years of education: N/A     Occupational History   • Not on file.     Social History Main Topics   • Smoking status: Never Smoker   • Smokeless tobacco: Never Used   • Alcohol use No   • Drug use: No   • Sexual activity: Not on file     Other Topics Concern   • Not on file     Social History Narrative " "  • No narrative on file     Allergies   Allergen Reactions   • Sulfa Drugs Rash and Swelling     Anasarca-especially face   • Latex Rash and Itching   • Tape Rash     Latex tape  (paper tape OK)     Outpatient Encounter Prescriptions as of 6/11/2019   Medication Sig Dispense Refill   • levothyroxine (SYNTHROID) 50 MCG Tab Take 50 mcg by mouth Every morning on an empty stomach.     • donepezil (ARICEPT) 10 MG tablet Take 1 Tab by mouth every evening for 180 days. 90 Tab 1   • escitalopram (LEXAPRO) 5 MG tablet Take 10 mg by mouth every day.     • Calcium Carbonate-Vitamin D (CALCIUM 600/VITAMIN D PO) Take  by mouth.     • atorvastatin (LIPITOR) 40 MG Tab Take 1 Tab by mouth every day. 90 Tab 3   • aspirin (ASA) 81 MG CHEW Take 81 mg by mouth every morning.     • [DISCONTINUED] levothyroxine (SYNTHROID) 88 MCG Tab Take 1 tablet by mouth  every morning on an empty  stomach (Patient not taking: Reported on 6/11/2019) 100 Tab 3     No facility-administered encounter medications on file as of 6/11/2019.      Review of Systems   Constitutional: Negative for chills and fever.   HENT: Negative for congestion.    Eyes: Negative for blurred vision, pain and discharge.   Respiratory: Negative for cough, shortness of breath and wheezing.    Cardiovascular: Negative for chest pain, palpitations, leg swelling and PND.   Gastrointestinal: Negative for abdominal pain, nausea and vomiting.   Musculoskeletal: Negative for falls, joint pain and myalgias.   Skin: Negative for itching and rash.   Neurological: Negative for dizziness and loss of consciousness.   Endo/Heme/Allergies: Does not bruise/bleed easily.   Psychiatric/Behavioral: Positive for memory loss. Negative for depression. The patient is not nervous/anxious and does not have insomnia.    All other systems reviewed and are negative.       Objective:   /54 (BP Location: Left arm, Patient Position: Sitting)   Pulse 60   Ht 1.549 m (5' 1\")   Wt 71.7 kg (158 lb)   " SpO2 97%   BMI 29.85 kg/m²     Physical Exam   Constitutional: She is oriented to person, place, and time. She appears well-developed and well-nourished. No distress.   Patient seen and examined as usual… Changes noted:   HENT:   Head: Normocephalic and atraumatic.   Mouth/Throat: No oropharyngeal exudate.   Eyes: Pupils are equal, round, and reactive to light. EOM are normal. Left eye exhibits no discharge.   Neck: No JVD present. No thyromegaly present.   Cardiovascular: Normal rate, regular rhythm and intact distal pulses.    No murmur heard.  Pacemaker intact in left chest   Pulmonary/Chest: Breath sounds normal. No respiratory distress.   Abdominal: Bowel sounds are normal. She exhibits no distension and no mass.   Musculoskeletal: She exhibits no edema.   Lymphadenopathy:     She has no cervical adenopathy.   Neurological: She is alert and oriented to person, place, and time.   Skin: Skin is warm and dry. No rash noted.   Psychiatric: She has a normal mood and affect. Her behavior is normal. Thought content normal.       Assessment:     1. S/P cardiac catheterization, minimal coronary artery disease April 2016     2. Hyperlipidemia with target LDL less than 100     3. Carotid artery aneurysm (HCC)     4. Complete heart block (HCC)     5. Cardiac pacemaker in situ         Medical Decision Making:  Today's Assessment / Status / Plan:     Heart block  I reviewed her pacemaker check today.  No fibrillation or significant problems.  No changes made or defects.  She will continue to see them every 6 months care, battery life good    Coronary disease  I looked at the images of her angiogram as well as her echo from 2016.  Normal function EF 60%  Continue on medications as is including high intensity Lipitor and baby aspirin  Reviewed lab which she gets with PCP annually    Carotid artery aneurysm  Duplex 2016 reviewed, mild plaquing, moderate subclavian artery stenosis on the right.  Suggest checking blood pressures  on the left.  Discussed  I do not see a significant aneurysm, follow watchfully  Statin and aspirin    RTC 1 year 6 months for evie

## 2019-10-22 ENCOUNTER — OFFICE VISIT (OUTPATIENT)
Dept: MEDICAL GROUP | Facility: MEDICAL CENTER | Age: 79
End: 2019-10-22
Payer: MEDICARE

## 2019-10-22 VITALS
OXYGEN SATURATION: 96 % | HEIGHT: 61 IN | BODY MASS INDEX: 29.93 KG/M2 | HEART RATE: 95 BPM | WEIGHT: 158.51 LBS | DIASTOLIC BLOOD PRESSURE: 86 MMHG | RESPIRATION RATE: 16 BRPM | TEMPERATURE: 97.3 F | SYSTOLIC BLOOD PRESSURE: 120 MMHG

## 2019-10-22 DIAGNOSIS — E78.5 HYPERLIPIDEMIA WITH TARGET LDL LESS THAN 100: ICD-10-CM

## 2019-10-22 DIAGNOSIS — F41.9 ANXIETY: ICD-10-CM
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
## 2019-10-22 DIAGNOSIS — I72.0 CAROTID ARTERY ANEURYSM (HCC): ICD-10-CM

## 2019-10-22 DIAGNOSIS — G31.84 MILD COGNITIVE IMPAIRMENT: ICD-10-CM

## 2019-10-22 DIAGNOSIS — Z95.0 CARDIAC PACEMAKER IN SITU: ICD-10-CM

## 2019-10-22 DIAGNOSIS — Z76.89 ENCOUNTER TO ESTABLISH CARE: ICD-10-CM

## 2019-10-22 PROBLEM — I72.9 ANEURYSM (HCC): Status: ACTIVE | Noted: 2019-10-22

## 2019-10-22 PROCEDURE — 99204 OFFICE O/P NEW MOD 45 MIN: CPT | Performed by: PHYSICIAN ASSISTANT

## 2019-10-22 RX ORDER — ESCITALOPRAM OXALATE 10 MG/1
10 TABLET ORAL DAILY
Qty: 90 TAB | Refills: 2 | Status: SHIPPED | OUTPATIENT
Start: 2019-10-22 | End: 2020-01-23 | Stop reason: SDUPTHER

## 2019-10-22 RX ORDER — ASPIRIN 81 MG/1
81 TABLET, CHEWABLE ORAL DAILY
COMMUNITY
End: 2019-10-22

## 2019-10-22 RX ORDER — DONEPEZIL HYDROCHLORIDE 10 MG/1
10 TABLET, FILM COATED ORAL EVERY EVENING
Qty: 90 TAB | Refills: 1 | Status: SHIPPED | OUTPATIENT
Start: 2019-10-22 | End: 2020-01-23 | Stop reason: SDUPTHER

## 2019-10-22 NOTE — ASSESSMENT & PLAN NOTE
Also diagnosed with early stages of dementia.  Has mild cognitive impairment according to neurology.  Daughter states is been getting worse.  There are concerns about where the remote control is for the garage .  She currently is on Aricept 10 mg daily.  She does live alone.  Has neighbors that watch over her.  Has family members that live close by.

## 2019-10-22 NOTE — LETTER
[facility]  Azeem Carter P.A.-C.  [address]  [place] [address]  Fax: [phone]   Authorization for Release/Disclosure of   Protected Health Information   Name: YASH CHOUDHARY : 1940 SSN: [id number]   Address: [address] [place] [address]  [place] [address] Phone:    [phone] (home)    I authorize the entity listed below to release/disclose the PHI below to:   [facility]/Azeem Carter P.A.-C. and Azeem Carter P.A.-C.   Provider or Entity Name:  [facility]   Address   City, State, Zip   Phone:      Fax:     Reason for request: continuity of care   Information to be released:    [ X ] LAST COLONOSCOPY,  including any PATH REPORT and follow-up  [  ] LAST FIT/COLOGUARD RESULT [  ] LAST DEXA  [  ] LAST MAMMOGRAM  [  ] LAST PAP  [  ] LAST LABS [  ] RETINA EXAM REPORT  [  ] IMMUNIZATION RECORDS  [  ] Release all info      [  ] Check here and initial the line next to each item to release ALL health information INCLUDING  _____ Care and treatment for drug and / or alcohol abuse  _____ HIV testing, infection status, or AIDS  _____ Genetic Testing    DATES OF SERVICE OR TIME PERIOD TO BE DISCLOSED: _____________  I understand and acknowledge that:  * This Authorization may be revoked at any time by you in writing, except if your health information has already been used or disclosed.  * Your health information that will be used or disclosed as a result of you signing this authorization could be re-disclosed by the recipient. If this occurs, your re-disclosed health information may no longer be protected by State or Federal laws.  * You may refuse to sign this Authorization. Your refusal will not affect your ability to obtain treatment.  * This Authorization becomes effective upon signing and will  on (date) __________.      If no date is indicated, this Authorization will  one (1) year from the signature date.    Name: Yash Choudhary    Signature:   Date:     10/22/2019            PLEASE FAX REQUESTED RECORDS BACK TO: [phone]

## 2019-10-22 NOTE — ASSESSMENT & PLAN NOTE
Has a chronic history of anxiety.  Anxiety is gotten worse.  Recently increased Lexapro to 10 mg a day by 1 of her daughters.  Previously she was on 5 mg.  Records indicate by neurology that she was supposed to be on 10 mg.

## 2019-10-22 NOTE — ASSESSMENT & PLAN NOTE
This is a 79-year-old female accompanied by 1 of her daughters.  She lives in [place].  She has a history of an aneurysm.  Has had repeated coils performed by Dr. Soto.  Denies any headaches today.

## 2019-10-22 NOTE — ASSESSMENT & PLAN NOTE
Fell a couple weeks ago onto her left hip.  Complains of a large bruise in that area.  Denies any further falls.  There are walkers and canes at home from her  who passed away 2 years ago from complications from Guillain-Barré syndrome after his flu vaccination.

## 2019-10-22 NOTE — ASSESSMENT & PLAN NOTE
This is a 79-year-old female accompanied by 1 of her daughters.  She lives in [place].  She has a history of an aneurysm.  Has had repeated coils performed by Dr. Soto.  Denies any headaches today.

## 2019-10-22 NOTE — PROGRESS NOTES
Subjective:   Kaila Choudhary is a 79 y.o. female here today for history of carotid artery aneurysm, anxiety, pacemaker, mild cognitive impairment and hyperlipidemia.  Also to establish care.    Aneurysm (LTAC, located within [facility])  This is a 79-year-old female accompanied by 1 of her daughters.  She lives in [place].  She has a history of an aneurysm.  Has had repeated coils performed by Dr. Soto.  Denies any headaches today.    Anxiety  Has a chronic history of anxiety.  Anxiety is gotten worse.  Recently increased Lexapro to 10 mg a day by 1 of her daughters.  Previously she was on 5 mg.  Records indicate by neurology that she was supposed to be on 10 mg.    Cardiac pacemaker in situ  Chronic condition.  Stable.  Follows with Dr. Mar.    Carotid artery aneurysm (CMS-LTAC, located within [facility])  This is a 79-year-old female accompanied by 1 of her daughters.  She lives in [place].  She has a history of an aneurysm.  Has had repeated coils performed by Dr. Soto.  Denies any headaches today.    Mild cognitive impairment  Also diagnosed with early stages of dementia.  Has mild cognitive impairment according to neurology.  Daughter states is been getting worse.  There are concerns about where the remote control is for the [employer] .  She currently is on Aricept 10 mg daily.  She does live alone.  Has neighbors that watch over her.  Has family members that live close by.    Hyperlipidemia with target LDL less than 100  Chronic condition.  Takes Lipitor 40 mg daily.    Fall  Fell a couple weeks ago onto her left hip.  Complains of a large bruise in that area.  Denies any further falls.  There are walkers and canes at home from her  who passed away 2 years ago from complications from Guillain-Barré syndrome after his flu vaccination.       Current medicines (including changes today)  Current Outpatient Medications   Medication Sig Dispense Refill   • escitalopram (LEXAPRO) 10 MG Tab Take 1 Tab by mouth every day. 90 Tab 2   •  "donepezil (ARICEPT) 10 MG tablet Take 1 Tab by mouth every evening for 180 days. 90 Tab 1   • levothyroxine (SYNTHROID) 50 MCG Tab Take 50 mcg by mouth Every morning on an empty stomach.     • Calcium Carbonate-Vitamin D (CALCIUM 600/VITAMIN D PO) Take  by mouth.     • atorvastatin (LIPITOR) 40 MG Tab Take 1 Tab by mouth every day. 90 Tab 3   • aspirin (ASA) 81 MG CHEW Take 81 mg by mouth every morning.       No current facility-administered medications for this visit.      She  has a past medical history of Arthritis, Cancer (HCC) (2013), CATARACT, Cerebral aneurysm, Cholesterol blood decreased, Dental disorder, Hyperlipidemia, Hypertension, Hypothyroidism (2/13/2013), OSTEOPOROSIS (2/13/2013), Pain, S/P cardiac catheterization, Skin infection (6/25/2014), and Thyroid disease.    Social History and Family History were reviewed and updated.    ROS   No chest pain, no shortness of breath, no abdominal pain and all other systems were reviewed and are negative.       Objective:     /86 (BP Location: Left arm, Patient Position: Sitting, BP Cuff Size: Adult)   Pulse 95   Temp 36.3 °C (97.3 °F) (Temporal)   Resp 16   Ht 1.549 m (5' 1\")   Wt 71.9 kg (158 lb 8.2 oz)   SpO2 96%  Body mass index is 29.95 kg/m².   Physical Exam:  Constitutional: Alert, no distress.  Skin: Warm, dry, good turgor, no rashes in visible areas.  Eye: Equal, round and reactive, conjunctiva clear, lids normal.  ENMT: Lips without lesions, good dentition, oropharynx clear.  Neck: Trachea midline, no masses.   Lymph: No cervical or supraclavicular lymphadenopathy  Respiratory: Unlabored respiratory effort, lungs appear clear, no wheezes.  Cardiovascular: Regular rate and rhythm.    Psych: Alert and oriented x3, normal affect and mood.        Assessment and Plan:   The following treatment plan was discussed    1. Carotid artery aneurysm (HCC)  Chronic condition.  Stable.    2. Anxiety  Chronic condition.  Increase Lexapro dose to 10 mg a " day.  Advised medication may take about 2 weeks to become effective.  - escitalopram (LEXAPRO) 10 MG Tab; Take 1 Tab by mouth every day.  Dispense: 90 Tab; Refill: 2    3. Mild cognitive impairment  Chronic condition.  Continue Aricept.  Refer to neurology.  - donepezil (ARICEPT) 10 MG tablet; Take 1 Tab by mouth every evening for 180 days.  Dispense: 90 Tab; Refill: 1  - REFERRAL TO NEUROLOGY    4. Fall, initial encounter  Status post fall.  Tries to use a single-point cane when needed.  We will continue to monitor status.    5. Cardiac pacemaker in situ  Chronic condition.  Stable.  Follow with cardiology as needed.  Appointment in December.    6. Hyperlipidemia with target LDL less than 100  Chronic condition.  Stable.  Continue Lipitor.    7. Encounter to establish care      Followup: Return in about 3 months (around 1/22/2020).    Please note that this dictation was created using voice recognition software. I have made every reasonable attempt to correct obvious errors, but I expect that there are errors of grammar and possibly content that I did not discover before finalizing the note.

## 2019-11-01 ENCOUNTER — OFFICE VISIT (OUTPATIENT)
Dept: MEDICAL GROUP | Facility: MEDICAL CENTER | Age: 79
End: 2019-11-01
Payer: MEDICARE

## 2019-11-01 VITALS
HEART RATE: 69 BPM | WEIGHT: 158 LBS | SYSTOLIC BLOOD PRESSURE: 140 MMHG | HEIGHT: 61 IN | RESPIRATION RATE: 16 BRPM | DIASTOLIC BLOOD PRESSURE: 72 MMHG | BODY MASS INDEX: 29.83 KG/M2 | TEMPERATURE: 96.4 F | OXYGEN SATURATION: 91 %

## 2019-11-01 DIAGNOSIS — G31.84 MILD COGNITIVE IMPAIRMENT: ICD-10-CM

## 2019-11-01 DIAGNOSIS — Z28.21 INFLUENZA VACCINATION DECLINED: ICD-10-CM

## 2019-11-01 DIAGNOSIS — H02.401 DROOPING EYELID, RIGHT: ICD-10-CM

## 2019-11-01 DIAGNOSIS — I72.0 CAROTID ARTERY ANEURYSM (HCC): ICD-10-CM

## 2019-11-01 PROCEDURE — 99214 OFFICE O/P EST MOD 30 MIN: CPT | Performed by: PHYSICIAN ASSISTANT

## 2019-11-01 NOTE — ASSESSMENT & PLAN NOTE
This is a 79-year-old female accompanied by her daughter, Luz.  She has had a 2-week history of her right eye lid drooping.  She states other occasions where and this occurred the coil had fallen out of place of the right carotid and it had to be replaced.  She follows with Dr. Soto.  She is requesting an appointment to see him.  She denies any other facial disturbances or weakness.  No body weaknesses.  Denies any nausea vomiting.  No chest pain or shortness of breath.  No headaches.  No change of vision.

## 2019-11-01 NOTE — PROGRESS NOTES
Subjective:   Kaila Choudhary is a 79 y.o. female here today for right eyelid drooping secondary to carotid artery aneurysm status post coil, mild cognitive impairment and influenza vaccination declined.    Carotid artery aneurysm (CMS-[facility])  This is a 79-year-old female accompanied by her daughter, Luz.  She has had a 2-week history of her right eye lid drooping.  She states other occasions where and this occurred the coil had fallen out of place of the right carotid and it had to be replaced.  She follows with Dr. Soto.  She is requesting an appointment to see him.  She denies any other facial disturbances or weakness.  No body weaknesses.  Denies any nausea vomiting.  No chest pain or shortness of breath.  No headaches.  No change of vision.    Influenza vaccination declined  Has been passed away from Guillain-Barré syndrome after a influenza vaccine about 2 years ago.  She does not want to have any vaccinations.    Mild cognitive impairment  Chronic condition.  Currently on Lexapro and Aricept 10 mg.       Current medicines (including changes today)  Current Outpatient Medications   Medication Sig Dispense Refill   • escitalopram (LEXAPRO) 10 MG Tab Take 1 Tab by mouth every day. 90 Tab 2   • donepezil (ARICEPT) 10 MG tablet Take 1 Tab by mouth every evening for 180 days. 90 Tab 1   • levothyroxine (SYNTHROID) 50 MCG Tab Take 50 mcg by mouth Every morning on an empty stomach.     • Calcium Carbonate-Vitamin D (CALCIUM 600/VITAMIN D PO) Take  by mouth.     • atorvastatin (LIPITOR) 40 MG Tab Take 1 Tab by mouth every day. 90 Tab 3   • aspirin (ASA) 81 MG CHEW Take 81 mg by mouth every morning.       No current facility-administered medications for this visit.      She  has a past medical history of Arthritis, Cancer ([facility]) (2013), CATARACT, Cerebral aneurysm, Cholesterol blood decreased, Dental disorder, Hyperlipidemia, Hypertension, Hypothyroidism (2/13/2013), OSTEOPOROSIS (2/13/2013), Pain, S/P  "cardiac catheterization, Skin infection (6/25/2014), and Thyroid disease.    Social History and Family History were reviewed and updated.    ROS   No chest pain, no shortness of breath, no abdominal pain and all other systems were reviewed and are negative.       Objective:     /72 (BP Location: Left arm, Patient Position: Sitting, BP Cuff Size: Adult)   Pulse 69   Temp (!) 35.8 °C (96.4 °F) (Temporal)   Resp 16   Ht 1.549 m (5' 1\")   Wt 71.7 kg (158 lb)   SpO2 91%  Body mass index is 29.85 kg/m².   Physical Exam:  Constitutional: Alert, no distress.  Skin: Warm, dry, good turgor, no rashes in visible areas.  Eye: Equal, round and reactive, conjunctiva clear, asymmetrical lid with the right side lower than the left..  ENMT: Lips without lesions, good dentition, oropharynx clear.  Neck: Trachea midline, no masses.   Lymph: No cervical or supraclavicular lymphadenopathy  Respiratory: Unlabored respiratory effort, lungs clear to auscultation, no wheezes, no ronchi.  Cardiovascular: Normal S1, S2, no murmur, no edema.  Neuro: CN II through XII intact.  No change in gait.  Upper extremity muscle strength at 5 out of 5.  Psych: Alert and oriented x3, normal affect and mood.        Assessment and Plan:   The following treatment plan was discussed    1. Carotid artery aneurysm (HCC)  Chronic condition.  Likely stable.  Given the current drooping of her right eyelid referred to interventional radiology.  - REFERRAL TO INTERVENTIONAL RADIOLOGY    2. Drooping eyelid, right  Acute, new onset condition.  Refer to IR for evaluation and treatment.  - REFERRAL TO INTERVENTIONAL RADIOLOGY    3. Influenza vaccination declined  Refused again.  Will try not to mention it to patient again.    4. Mild cognitive impairment  Chronic condition.  Stable.  Continue Lexapro and Aricept as directed.      Followup: Return if symptoms worsen or fail to improve.    Please note that this dictation was created using voice recognition " software. I have made every reasonable attempt to correct obvious errors, but I expect that there are errors of grammar and possibly content that I did not discover before finalizing the note.

## 2019-11-01 NOTE — ASSESSMENT & PLAN NOTE
Has been passed away from Guillain-Barré syndrome after a influenza vaccine about 2 years ago.  She does not want to have any vaccinations.

## 2019-11-08 ENCOUNTER — PATIENT MESSAGE (OUTPATIENT)
Dept: CARDIOLOGY | Facility: MEDICAL CENTER | Age: 79
End: 2019-11-08

## 2019-11-08 DIAGNOSIS — H02.401 DROOPING EYELID, RIGHT: ICD-10-CM

## 2019-11-08 DIAGNOSIS — Z01.818 PRE-OP TESTING: ICD-10-CM

## 2019-11-08 DIAGNOSIS — I67.1 NONRUPTURED CEREBRAL ANEURYSM: ICD-10-CM

## 2019-11-08 DIAGNOSIS — G31.84 MILD COGNITIVE IMPAIRMENT: ICD-10-CM

## 2019-11-08 NOTE — PROGRESS NOTES
"Referral received for f/u for previously coiled right cavernous carotid artery aneurysm, treated several times at [facility] with coils and an Vina stent and a 6 mm recurrence coiling in 2013 by Frederick Perez MD. Pt has new complaint of right ptosis and thinks it from aneurysm coils \"coming loose.\" No workup for this complaint noted in EMR. Pt has been noncompliant with aneurysm imaging in the past and has not been seen in our practice since 2016, most recently refusing follow up MRA in 2018.     Will order updated MRA to eval previously treated aneurysm and MRI brain w/ and w/o to r/o cause of ptosis, then see pt in clinic for evaluation. Pt has pacemaker implanted at [facility] 10/13/16 and MR imaging dependent on device compatibility.     IMPLANTED DEVICE INFORMATION:  Pulse generator is a [employer] model FH6578  Serial # [id number]     LEAD INFORMATION:  1)Right atrial lead is a SJM model #2088TC/46, serial #[id number],P wave 2.4 millivolts, threshold 0.5 Volts at 0.5 milliseconds, pacing impedance 417 Ohms.     2)Right ventricular lead is a SJM model #2088TC/52, serial #[id number],R wave 6.3 millivolts, threshold 0.5 Volts at 0.5 milliseconds, pacing impedance 1100 Ohms.    Discussed with Dr. Perez who is in agreement with above plan.  "

## 2019-11-16 NOTE — PATIENT COMMUNICATION
Spoke with daughter-- advised monitor ordered today from [facility] should receive in 1-2 weeks.  Cell adapter to be mailed to patient.

## 2019-11-21 ENCOUNTER — HOSPITAL ENCOUNTER (OUTPATIENT)
Dept: LAB | Facility: MEDICAL CENTER | Age: 79
End: 2019-11-21
Attending: NURSE PRACTITIONER
Payer: MEDICARE

## 2019-11-21 DIAGNOSIS — Z01.818 PRE-OP TESTING: ICD-10-CM

## 2019-11-21 LAB — CREAT SERPL-MCNC: 0.84 MG/DL (ref 0.5–1.4)

## 2019-11-21 PROCEDURE — 36415 COLL VENOUS BLD VENIPUNCTURE: CPT

## 2019-11-21 PROCEDURE — 82565 ASSAY OF CREATININE: CPT

## 2019-11-30 ENCOUNTER — HOSPITAL ENCOUNTER (OUTPATIENT)
Dept: RADIOLOGY | Facility: MEDICAL CENTER | Age: 79
End: 2019-11-30
Attending: NURSE PRACTITIONER
Payer: MEDICARE

## 2019-11-30 DIAGNOSIS — H02.401 UNSPECIFIED PTOSIS OF RIGHT EYELID: ICD-10-CM

## 2019-11-30 DIAGNOSIS — I67.1 INTRACRANIAL ANEURYSM: ICD-10-CM

## 2019-11-30 PROCEDURE — 70496 CT ANGIOGRAPHY HEAD: CPT

## 2019-11-30 PROCEDURE — 700117 HCHG RX CONTRAST REV CODE 255: Performed by: NURSE PRACTITIONER

## 2019-11-30 RX ADMIN — IOHEXOL 75 ML: 350 INJECTION, SOLUTION INTRAVENOUS at 09:57

## 2019-12-04 NOTE — PROGRESS NOTES
"Neuro Interventional Service Consultation      Re: Kaila Choudhary     MRN: [id number]   : 1940    Kaila Choudhary was referred to our service by Azeem Carter PA-C.  She is a 79 y.o. female seen in clinic for evaluation of a previously treated right internal carotid artery aneurysm.     History of Present Illness:  Ms. Choudhary was previously referred to us in  for evaluation and management of a previously coiled right cavernous carotid artery aneurysm, treated several times at [facility] with coils and an [place] stent. She again recurred and underwent coiling of a 6 mm recurrence in  by Reginaldo Perez MD. She followed up with imaging in  and , which was the last encounter with our practice. She refused the recommended MRA imaging in 2018. She has since presented with a new complaint of right ptosis and referring notes indicate she thinks it from aneurysm coils \"coming loose.\" This started suddenly the last week of 2019 and she has not had it before. She has not been evaluated by neurology or ophthalmology for this problem. She has a remote history of right cataract removal and a more recent left cataract removal but has not been examined since this problem began. She has been referred to the Neuro Interventional Service for evaluation and management of this finding. We ordered updated imaging in advance of the appointment today to evaluate for any acute intracranial process to help work up the ptosis. Of note, she has had an interval placement of a non MRI conditional pacemaker and therefore we obtained a CTA.     She is seen today for review of imaging studies and discussion of right ptosis. Today, the patient reports no improvement in the right ptosis. She sometimes has blurred vision in that eye and pain above the eye along her eyebrow. She denies discharge. She denies any focal extremity weakness. She denies speech difficulty. She is accompanied by her daughter to the " "appointment.     Past Medical History:   Diagnosis Date   • Arthritis     L hip   • Cancer (HCC) 2013    basal cell head   • CATARACT     R eye repaired   • Cerebral aneurysm    • Cholesterol blood decreased    • Dental disorder     front upper veneers   • Hyperlipidemia    • Hypertension    • Hypothyroidism 2/13/2013   • OSTEOPOROSIS 2/13/2013   • Pain     L hip is \"nagging\"   • S/P cardiac catheterization    • Skin infection 6/25/2014   • Thyroid disease      Past Surgical History:   Procedure Laterality Date   • RECOVERY  3/21/2016    Procedure: CATH LAB [facility] WITH POSSIBLE WIEDENBECK ;  Surgeon: [facility];  Location: SURGERY PRE-POST PROC UNIT [facility];  Service:    • RECOVERY  7/24/2014    Performed by [facility] at SURGERY SAME DAY [facility]S   • RECOVERY  10/16/2013    Performed by [facility] at SURGERY [facility] ORS   • OTHER  2004    R cataract-IOL   • SHOULDER SURGERY  1998   • GYN SURGERY  1982    hysterectomy   • TONSILLECTOMY       Social History     Socioeconomic History   • Marital status:      Spouse name: Not on file   • Number of children: Not on file   • Years of education: Not on file   • Highest education level: Not on file   Occupational History   • Not on file   Social Needs   • Financial resource strain: Not on file   • Food insecurity:     Worry: Not on file     Inability: Not on file   • Transportation needs:     Medical: Not on file     Non-medical: Not on file   Tobacco Use   • Smoking status: Never Smoker   • Smokeless tobacco: Never Used   Substance and Sexual Activity   • Alcohol use: No     Alcohol/week: 0.0 oz   • Drug use: No   • Sexual activity: Not on file     Comment: , 4 daughters, 13 grands, 6 great-grands   Lifestyle   • Physical activity:     Days per week: Not on file     Minutes per session: Not on file   • Stress: Not on file   Relationships   • Social connections:     Talks on phone: Not on file     Gets together: Not on file     " Attends Christian service: Not on file     Active member of club or organization: Not on file     Attends meetings of clubs or organizations: Not on file     Relationship status: Not on file   • Intimate partner violence:     Fear of current or ex partner: Not on file     Emotionally abused: Not on file     Physically abused: Not on file     Forced sexual activity: Not on file   Other Topics Concern   • Not on file   Social History Narrative   • Not on file     Family History   Problem Relation Age of Onset   • Heart Disease Mother    • Heart Disease Father        Review of Systems   Constitutional: Negative.    Eyes: Positive for blurred vision (right) and pain (right). Negative for discharge and redness.        Positive for right eyelid droop   Neurological: Negative for speech change, focal weakness, weakness and headaches.   Psychiatric/Behavioral: Negative for substance abuse.     A comprehensive 14-point review of systems was negative except as described above.     Labs:      Ref. Range 11/21/2019 14:36   Creatinine Latest Ref Range: 0.50 - 1.40 mg/dL 0.84   GFR If  Latest Ref Range: >60 mL/min/1.73 m 2 >60   GFR If Non  Latest Ref Range: >60 mL/min/1.73 m 2 >60       Radiology:   CTA head on November 30, 2019 at [facility]:  1.  Exam limited by artifact due to coiling material/stent material involving the right cavernous ICA region.  2.  There is diffuse ectasia/aneurysm of the cavernous left internal carotid artery.  3.  There is otherwise no focal intracranial aneurysm identified.  4.  There is diffuse atrophy.  5.  There is scattered low attenuation in the white matter most suggestive of microangiopathic ischemic change.  Differential includes demyelination and gliosis.    Cerebral angiogram September 30, 2016 at [facility]:  1.  There is stable 2 mm residual neck at the previously repaired right internal carotid artery aneurysm.  2.  The small left cavernous carotid aneurysm is not  well-visualized in this study most likely secondary to the overlap of the contralateral coil mass. This can be followed with the MR angiogram after 2 years.     Cerebral angiogram July 24, 2014 at [facility]:  1. There is minimal approximately 1-2 mm residual at the neck.    Neurointerventional procedure October 18, 2013 at [facility]:  Endovascular repair of recurrent right ICA aneurysm    Current Outpatient Medications   Medication Sig Dispense Refill   • escitalopram (LEXAPRO) 10 MG Tab Take 1 Tab by mouth every day. 90 Tab 2   • donepezil (ARICEPT) 10 MG tablet Take 1 Tab by mouth every evening for 180 days. 90 Tab 1   • levothyroxine (SYNTHROID) 50 MCG Tab Take 50 mcg by mouth Every morning on an empty stomach.     • Calcium Carbonate-Vitamin D (CALCIUM 600/VITAMIN D PO) Take  by mouth.     • atorvastatin (LIPITOR) 40 MG Tab Take 1 Tab by mouth every day. 90 Tab 3   • aspirin (ASA) 81 MG CHEW Take 81 mg by mouth every morning.       No current facility-administered medications for this encounter.        Allergies   Allergen Reactions   • Sulfa Drugs Rash and Swelling     Anasarca-especially face   • Latex Rash and Itching   • Tape Rash     Latex tape  (paper tape OK)       Physical Exam   Constitutional: She is oriented to person, place, and time and well-developed, well-nourished, and in no distress. No distress.   HENT:   Head: Normocephalic.   Eyes: Right eye exhibits no discharge and no exudate. Right conjunctiva is not injected. Right conjunctiva has no hemorrhage. Left conjunctiva is not injected. Left conjunctiva has no hemorrhage. No scleral icterus. Right eye exhibits normal extraocular motion and no nystagmus. Left eye exhibits normal extraocular motion and no nystagmus.   Pulmonary/Chest: Effort normal. No respiratory distress.   Abdominal: She exhibits no distension.   Neurological: She is alert and oriented to person, place, and time. She has normal sensation and normal strength. She is not agitated and  not disoriented. She displays facial asymmetry (right eyelid droop.  Lower face symmetric. ). She displays no weakness, no tremor, normal stance and normal speech. Coordination normal.   Skin: Skin is warm and dry. No rash noted. She is not diaphoretic. No erythema. No pallor.   Psychiatric: Mood, memory, affect and judgment normal.     Impression:   1. Right cavernous internal carotid artery aneurysm, status post stent assisted coiling.   2. Right eye ptosis.  3. Pacemaker, non MRI conditional.  4. Hypothyroidism.  5. Hyperlipidemia.  6. Osteoporosis.  7. Arthritis.    Plan:   Reginaldo Perez MD has reviewed 's history and imaging studies, examined the patient, and discussed treatment options.  has a large right ICA coil mass from several prior aneurysm interventions. The aneurysm was last evaluated in 2016 and found to be stable since the 2013 intervention. She has developed an acute problem with right eyelid ptosis and blurred vision. While it may be related to the coil mass causing focal irritation on a cranial nerve, it is possible there are other causes. We recommend that she be evaluated by her ophthalmologist. We will arrange a catheter angiogram to better evaluate the aneurysm as the CTA has too much artifact from the coils to evaluate the neck. She is in agreement with this. We explained that we may not find any changes with the aneurysm and, if her ophthalmologist has no acute findings, she could benefit from a neurology or neuro ophthalmology evaluation. The angiogram is scheduled for December 16.     KASIA Turk with Reginaldo Perez MD  Neuro Interventional Service   [address])  [place] [address]  [phone]

## 2019-12-05 ENCOUNTER — HOSPITAL ENCOUNTER (OUTPATIENT)
Dept: RADIOLOGY | Facility: MEDICAL CENTER | Age: 79
End: 2019-12-05
Attending: PHYSICIAN ASSISTANT
Payer: MEDICARE

## 2019-12-05 DIAGNOSIS — Z01.812 PRE-OPERATIVE LABORATORY EXAMINATION: ICD-10-CM

## 2019-12-05 DIAGNOSIS — I72.0 CAROTID ARTERY ANEURYSM (HCC): ICD-10-CM

## 2019-12-05 DIAGNOSIS — H02.401 DROOPING EYELID, RIGHT: ICD-10-CM

## 2019-12-05 LAB
ERYTHROCYTE [DISTWIDTH] IN BLOOD BY AUTOMATED COUNT: 44.6 FL (ref 35.9–50)
HCT VFR BLD AUTO: 41.3 % (ref 37–47)
HGB BLD-MCNC: 13.8 G/DL (ref 12–16)
INR PPP: 0.99 (ref 0.87–1.13)
MCH RBC QN AUTO: 31.9 PG (ref 27–33)
MCHC RBC AUTO-ENTMCNC: 33.4 G/DL (ref 33.6–35)
MCV RBC AUTO: 95.6 FL (ref 81.4–97.8)
PLATELET # BLD AUTO: 187 K/UL (ref 164–446)
PMV BLD AUTO: 11.8 FL (ref 9–12.9)
PROTHROMBIN TIME: 13.3 SEC (ref 12–14.6)
RBC # BLD AUTO: 4.32 M/UL (ref 4.2–5.4)
WBC # BLD AUTO: 6.9 K/UL (ref 4.8–10.8)

## 2019-12-05 PROCEDURE — 36415 COLL VENOUS BLD VENIPUNCTURE: CPT

## 2019-12-05 PROCEDURE — 85027 COMPLETE CBC AUTOMATED: CPT

## 2019-12-05 PROCEDURE — 85610 PROTHROMBIN TIME: CPT

## 2019-12-05 RX ORDER — SODIUM CHLORIDE 9 MG/ML
INJECTION, SOLUTION INTRAVENOUS CONTINUOUS
Status: CANCELLED | OUTPATIENT
Start: 2019-12-16

## 2019-12-05 ASSESSMENT — ENCOUNTER SYMPTOMS
EYE REDNESS: 0
CONSTITUTIONAL NEGATIVE: 1
EYE DISCHARGE: 0
WEAKNESS: 0
BLURRED VISION: 1
SPEECH CHANGE: 0
HEADACHES: 0
EYE PAIN: 1
FOCAL WEAKNESS: 0

## 2019-12-05 ASSESSMENT — LIFESTYLE VARIABLES: SUBSTANCE_ABUSE: 0

## 2019-12-16 ENCOUNTER — HOSPITAL ENCOUNTER (OUTPATIENT)
Facility: MEDICAL CENTER | Age: 79
End: 2019-12-16
Attending: RADIOLOGY | Admitting: RADIOLOGY
Payer: MEDICARE

## 2019-12-16 ENCOUNTER — APPOINTMENT (OUTPATIENT)
Dept: RADIOLOGY | Facility: MEDICAL CENTER | Age: 79
End: 2019-12-16
Attending: RADIOLOGY
Payer: MEDICARE

## 2019-12-16 VITALS
WEIGHT: 154.1 LBS | HEART RATE: 63 BPM | DIASTOLIC BLOOD PRESSURE: 59 MMHG | TEMPERATURE: 97.8 F | OXYGEN SATURATION: 97 % | RESPIRATION RATE: 16 BRPM | BODY MASS INDEX: 29.09 KG/M2 | HEIGHT: 61 IN | SYSTOLIC BLOOD PRESSURE: 137 MMHG

## 2019-12-16 DIAGNOSIS — I67.1 INTRACRANIAL ANEURYSM: ICD-10-CM

## 2019-12-16 PROCEDURE — 700117 HCHG RX CONTRAST REV CODE 255: Performed by: RADIOLOGY

## 2019-12-16 PROCEDURE — 99153 MOD SED SAME PHYS/QHP EA: CPT

## 2019-12-16 PROCEDURE — 700111 HCHG RX REV CODE 636 W/ 250 OVERRIDE (IP): Performed by: RADIOLOGY

## 2019-12-16 PROCEDURE — 160002 HCHG RECOVERY MINUTES (STAT)

## 2019-12-16 PROCEDURE — 700111 HCHG RX REV CODE 636 W/ 250 OVERRIDE (IP)

## 2019-12-16 RX ORDER — MIDAZOLAM HYDROCHLORIDE 1 MG/ML
.5-2 INJECTION INTRAMUSCULAR; INTRAVENOUS PRN
Status: ACTIVE | OUTPATIENT
Start: 2019-12-16 | End: 2019-12-16

## 2019-12-16 RX ORDER — ONDANSETRON 2 MG/ML
4 INJECTION INTRAMUSCULAR; INTRAVENOUS PRN
Status: ACTIVE | OUTPATIENT
Start: 2019-12-16 | End: 2019-12-16

## 2019-12-16 RX ORDER — OXYCODONE HYDROCHLORIDE 5 MG/1
2.5 TABLET ORAL
Status: DISCONTINUED | OUTPATIENT
Start: 2019-12-16 | End: 2019-12-16 | Stop reason: HOSPADM

## 2019-12-16 RX ORDER — SODIUM CHLORIDE 9 MG/ML
INJECTION, SOLUTION INTRAVENOUS CONTINUOUS
Status: DISCONTINUED | OUTPATIENT
Start: 2019-12-16 | End: 2019-12-16 | Stop reason: HOSPADM

## 2019-12-16 RX ORDER — SODIUM CHLORIDE 9 MG/ML
500 INJECTION, SOLUTION INTRAVENOUS
Status: ACTIVE | OUTPATIENT
Start: 2019-12-16 | End: 2019-12-16

## 2019-12-16 RX ORDER — HYDRALAZINE HYDROCHLORIDE 20 MG/ML
INJECTION INTRAMUSCULAR; INTRAVENOUS
Status: COMPLETED
Start: 2019-12-16 | End: 2019-12-16

## 2019-12-16 RX ORDER — MIDAZOLAM HYDROCHLORIDE 1 MG/ML
INJECTION INTRAMUSCULAR; INTRAVENOUS
Status: COMPLETED
Start: 2019-12-16 | End: 2019-12-16

## 2019-12-16 RX ORDER — OXYCODONE HYDROCHLORIDE 5 MG/1
5 TABLET ORAL
Status: DISCONTINUED | OUTPATIENT
Start: 2019-12-16 | End: 2019-12-16 | Stop reason: HOSPADM

## 2019-12-16 RX ORDER — HYDROMORPHONE HYDROCHLORIDE 2 MG/ML
0.25 INJECTION, SOLUTION INTRAMUSCULAR; INTRAVENOUS; SUBCUTANEOUS
Status: DISCONTINUED | OUTPATIENT
Start: 2019-12-16 | End: 2019-12-16 | Stop reason: HOSPADM

## 2019-12-16 RX ORDER — HYDRALAZINE HYDROCHLORIDE 20 MG/ML
10 INJECTION INTRAMUSCULAR; INTRAVENOUS ONCE
Status: COMPLETED | OUTPATIENT
Start: 2019-12-16 | End: 2019-12-16

## 2019-12-16 RX ADMIN — IOHEXOL 125 ML: 300 INJECTION, SOLUTION INTRAVENOUS at 17:37

## 2019-12-16 RX ADMIN — MIDAZOLAM HYDROCHLORIDE 1 MG: 1 INJECTION, SOLUTION INTRAMUSCULAR; INTRAVENOUS at 16:15

## 2019-12-16 RX ADMIN — FENTANYL CITRATE 25 MCG: 50 INJECTION, SOLUTION INTRAMUSCULAR; INTRAVENOUS at 17:25

## 2019-12-16 RX ADMIN — FENTANYL CITRATE 25 MCG: 50 INJECTION, SOLUTION INTRAMUSCULAR; INTRAVENOUS at 17:13

## 2019-12-16 RX ADMIN — MIDAZOLAM 1 MG: 1 INJECTION INTRAMUSCULAR; INTRAVENOUS at 16:15

## 2019-12-16 RX ADMIN — FENTANYL CITRATE 25 MCG: 50 INJECTION, SOLUTION INTRAMUSCULAR; INTRAVENOUS at 16:45

## 2019-12-16 RX ADMIN — HYDRALAZINE HYDROCHLORIDE 10 MG: 20 INJECTION INTRAMUSCULAR; INTRAVENOUS at 18:33

## 2019-12-16 RX ADMIN — FENTANYL CITRATE 25 MCG: 0.05 INJECTION, SOLUTION INTRAMUSCULAR; INTRAVENOUS at 16:05

## 2019-12-16 RX ADMIN — FENTANYL CITRATE 25 MCG: 50 INJECTION, SOLUTION INTRAMUSCULAR; INTRAVENOUS at 16:05

## 2019-12-16 RX ADMIN — MIDAZOLAM 0.5 MG: 1 INJECTION INTRAMUSCULAR; INTRAVENOUS at 16:50

## 2019-12-16 RX ADMIN — MIDAZOLAM 0.5 MG: 1 INJECTION INTRAMUSCULAR; INTRAVENOUS at 17:14

## 2019-12-17 NOTE — OR SURGEON
Immediate Post- Operative Note        PostOp Diagnosis: aneurysm      Procedure(s): Diagnostic cerebral angiogram      Estimated Blood Loss: Less than 5 ml        Complications: None            12/16/2019     5:37 PM     Reginaldo Perez

## 2019-12-17 NOTE — DISCHARGE INSTRUCTIONS
"ACTIVITY: Rest and take it easy for the first 24 hours.  A responsible adult is recommended to remain with you during that time.  It is normal to feel sleepy.  We encourage you to not do anything that requires balance, judgment or coordination.    MILD FLU-LIKE SYMPTOMS ARE NORMAL. YOU MAY EXPERIENCE GENERALIZED MUSCLE ACHES, THROAT IRRITATION, HEADACHE AND/OR SOME NAUSEA.    FOR 24 HOURS DO NOT:  Drive, operate machinery or run household appliances.  Drink beer or alcoholic beverages.   Make important decisions or sign legal documents.    SPECIAL INSTRUCTIONS:   Groin Care Instructions     INSTRUCTIONS  1. Examine (look and feel) the site of your incision site TODAY so you can recognize changes that should be called to your doctor (see below).  2. Avoid straining either by lifting or pulling objects for 4-5 days. Avoid lifting over 5 pounds.   3. For at least 72 hours, if you should sneeze or cough, please hold pressure over your groin area.  4. If you should begin to have oozing from the catheterization site, please hold firm pressure and call your doctor's office immediately.  5. If profuse bleeding occurs from the catheterization site, hold f-irm pressure and call \"911\" immediately for assistance.  6. Remove bandage after 24 hours.     ACTIVITY  1. Limit activity as instructed by your doctor.  2. No driving or very limited driving with frequent stops for one week.   3. If you must take a long car ride, stop every hour and walk around the car.   4. Warm showers or baths are permitted after the bandage is removed. Avoid hot showers, baths, hot tubs, and swimming for one week.    PLEASE CALL YOUR DOCTOR IF:  1. Temperature elevation occurs.  2. Catheterization site becomes reddened or begins to drain.   3. Bruising appears to be new or not resolving. The bruise may move down your leg. This is normal.  4. The small round lump in the groin increases in size.  5. Any leg numbness, aching, or discomfort " (immediately).  6. Increasing discomfort in the leg at the insertion site.  7. Chest pains, even if relieved by Nitroglycerin.    MISCELLANEOUS INSTRUCTIONS  1. Bruising may occur as a result of heart catheterization. Some of the discoloration may travel down the leg, going from blue to green in color.  2. A small round lump under the catheterization site will remain for up to six weeks.  3. If any questions arise call your physician's office. You can also call the HEALTH HOTLINE open 24 hours/day, 7 days/week and speak to a nurse at [phone], or toll free at [phone].   4. You should call 911 if you develop problems with breathing or chest pain.    FOR PROBLEMS CALL DEAN Perez AT: [phone]    I acknowledge receipt and understanding of these Home Care instructions.  -  DIET: To avoid nausea, slowly advance diet as tolerated, avoiding spicy or greasy foods for the first day.  Add more substantial food to your diet according to your physician's instructions.  Babies can be fed formula or breast milk as soon as they are hungry.  INCREASE FLUIDS AND FIBER TO AVOID CONSTIPATION.    SURGICAL DRESSING/BATHING: Keep dressing and incision dry and intact for 24 hours, may remove dressing and shower after 7 PM on 12/17, do not need to replace.  Do not submerge site in water for 7 days.    FOLLOW-UP APPOINTMENT:  A follow-up appointment should be arranged with your Dr. Perez [phone] and your primary care doctor; call to schedule.    You should CALL YOUR PHYSICIAN if you develop:  Fever greater than 101 degrees F.  Pain not relieved by medication, or persistent nausea or vomiting.  Excessive bleeding (blood soaking through dressing) or unexpected drainage from the wound.  Extreme redness or swelling around the incision site, drainage of pus or foul smelling drainage.  Inability to urinate or empty your bladder within 8 hours.  Problems with breathing or chest pain.    You should call 911 if you develop  problems with breathing or chest pain.  If you are unable to contact your doctor or surgical center, you should go to the nearest emergency room or urgent care center.  Physician's telephone #: [phone]    If any questions arise, call your doctor.  If your doctor is not available, please feel free to call the Surgical Center at [phone].  The Center is open Monday through Friday from 7AM to 7PM.  You can also call the HEALTH HOTLINE open 24 hours/day, 7 days/week and speak to a nurse at [phone], or toll free at [phone].    A registered nurse may call you a few days after your surgery to see how you are doing after your procedure.    MEDICATIONS: Resume taking daily medication.  Take prescribed pain medication with food.  If no medication is prescribed, you may take non-aspirin pain medication if needed.  PAIN MEDICATION CAN BE VERY CONSTIPATING.  Take a stool softener or laxative such as senokot, pericolace, or milk of magnesia if needed.    If your physician has prescribed pain medication that includes Acetaminophen (Tylenol), do not take additional Acetaminophen (Tylenol) while taking the prescribed medication.    Depression / Suicide Risk    As you are discharged from this [facility] facility, it is important to learn how to keep safe from harming yourself.    Recognize the warning signs:  · Abrupt changes in personality, positive or negative- including increase in energy   · Giving away possessions  · Change in eating patterns- significant weight changes-  positive or negative  · Change in sleeping patterns- unable to sleep or sleeping all the time   · Unwillingness or inability to communicate  · Depression  · Unusual sadness, discouragement and loneliness  · Talk of wanting to die  · Neglect of personal appearance   · Rebelliousness- reckless behavior  · Withdrawal from people/activities they love  · Confusion- inability to concentrate     If you or a loved one observes any of these  behaviors or has concerns about self-harm, here's what you can do:  · Talk about it- your feelings and reasons for harming yourself  · Remove any means that you might use to hurt yourself (examples: pills, rope, extension cords, firearm)  · Get professional help from the community (Mental Health, Substance Abuse, psychological counseling)  · Do not be alone:Call your Safe Contact- someone whom you trust who will be there for you.  · Call your local CRISIS HOTLINE 583-9739 or 778-967-5898  · Call your local Children's Mobile Crisis Response Team [place] (648) 260-4901 or www.[employer]  · Call the toll free National Suicide Prevention Hotlines   · National Suicide Prevention Lifeline 541-358-JFLY (5204)  · National Hope Line Network 800-SUICIDE (963-5073)

## 2019-12-17 NOTE — OR NURSING
"1752 Patient arrived to unit, awake and alert.  Right groin site clean, dry and soft.  Patient denies pain at this time.  Updated on plan of care, verbalized understanding.  Patient's blood pressure elevated, states her blood pressure is not usually this elevated.  1815 Patient voided on bedpan.  Right groin clean, dry and soft.  1825 Patient states she is \"slightly uncomfortable,\" denies any other discomfort at this time.  1830 Dr. Perez paged and updated on patient's blood pressure.  Orders received.  1833 Patient medicated per MAR for blood pressure.  1900 Head of bed elevated, groin site clean, dry and soft.  BP improved.  1912 Patient ambulated to bathroom, groin site clean, dry and soft post ambulation.  1920 All lines and monitors discontinued. Reviewed discharge paperwork with pt and daughter. Discussed diet, activity, medications, follow up care and worsening symptoms. No questions at this time.   1928 Pt discharged home with daughter via wheelchair by RN.  "

## 2019-12-17 NOTE — PROGRESS NOTES
Cerebral arteriogram performed by Dr Perez via right femoral access. Procedure BARs explained to patient by physician and consent obtained. Patient to IR3 and assisted to table. Patient was monitored and assessed continuously throughout procedure; ETCO2 29-35 with consistent waveform. Procedure completed without complication. Right groin puncture closed with angioseal and site CDI; sterile guaze/tegaderm dressing applied. Patient tolerated procedure quite well, was slightly drowsy but appropriately responsive and conversant  afterward. Patient returned to [facility] in good condition. Family updated and at bedside.    [facility] #[id number] ref [id number] lot [id number]

## 2019-12-18 NOTE — PROGRESS NOTES
Angiogram reviewed with Dr. Perez. There is no evidence of recurrent aneurysm. Both aneurysms are stable compared to 2013. It is unclear what has caused the acute ptosis but there are no neurovascular changes. Daughter states pt had prolonged confusion after sedation medications but seemed back to baseline yesterday morning.    Results called to pt's daughter (cell number in EMR) and attempted to call pt at home as well. Recommend pt f/u with her PCP for evaluation of this complaint. Neurology, ophthalmology, or neurophthalmology referral may be of benefit.

## 2020-01-23 ENCOUNTER — OFFICE VISIT (OUTPATIENT)
Dept: MEDICAL GROUP | Facility: MEDICAL CENTER | Age: 80
End: 2020-01-23
Payer: MEDICARE

## 2020-01-23 VITALS
BODY MASS INDEX: 28.89 KG/M2 | RESPIRATION RATE: 14 BRPM | HEIGHT: 61 IN | DIASTOLIC BLOOD PRESSURE: 60 MMHG | TEMPERATURE: 97.9 F | HEART RATE: 60 BPM | WEIGHT: 153 LBS | SYSTOLIC BLOOD PRESSURE: 114 MMHG | OXYGEN SATURATION: 94 %

## 2020-01-23 DIAGNOSIS — E78.5 HYPERLIPIDEMIA WITH TARGET LDL LESS THAN 100: ICD-10-CM

## 2020-01-23 DIAGNOSIS — I72.0 CAROTID ARTERY ANEURYSM (HCC): ICD-10-CM

## 2020-01-23 DIAGNOSIS — R90.82 WHITE MATTER ABNORMALITY ON MRI OF BRAIN: ICD-10-CM

## 2020-01-23 DIAGNOSIS — R25.1 SHAKING: ICD-10-CM

## 2020-01-23 DIAGNOSIS — F41.9 ANXIETY: ICD-10-CM

## 2020-01-23 DIAGNOSIS — E03.4 HYPOTHYROIDISM DUE TO ACQUIRED ATROPHY OF THYROID: ICD-10-CM

## 2020-01-23 DIAGNOSIS — G31.84 MILD COGNITIVE IMPAIRMENT: ICD-10-CM

## 2020-01-23 DIAGNOSIS — H02.401 DROOPING EYELID, RIGHT: ICD-10-CM

## 2020-01-23 DIAGNOSIS — H53.2 DOUBLE VISION WITH BOTH EYES OPEN: ICD-10-CM
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
## 2020-01-23 PROCEDURE — 99214 OFFICE O/P EST MOD 30 MIN: CPT | Performed by: PHYSICIAN ASSISTANT

## 2020-01-23 RX ORDER — ESCITALOPRAM OXALATE 10 MG/1
10 TABLET ORAL DAILY
Qty: 90 TAB | Refills: 2 | Status: SHIPPED | OUTPATIENT
Start: 2020-01-23 | End: 2020-06-02 | Stop reason: SDUPTHER

## 2020-01-23 RX ORDER — LEVOTHYROXINE SODIUM 88 UG/1
88 TABLET ORAL
Qty: 90 TAB | Refills: 2 | Status: SHIPPED | OUTPATIENT
Start: 2020-01-23 | End: 2020-06-02 | Stop reason: SDUPTHER

## 2020-01-23 RX ORDER — DONEPEZIL HYDROCHLORIDE 10 MG/1
10 TABLET, FILM COATED ORAL EVERY EVENING
Qty: 90 TAB | Refills: 2 | Status: SHIPPED | OUTPATIENT
Start: 2020-01-23 | End: 2020-04-01 | Stop reason: SDUPTHER

## 2020-01-23 RX ORDER — ATORVASTATIN CALCIUM 40 MG/1
40 TABLET, FILM COATED ORAL DAILY
Qty: 90 TAB | Refills: 2 | Status: SHIPPED | OUTPATIENT
Start: 2020-01-23 | End: 2020-06-02 | Stop reason: SDUPTHER

## 2020-01-23 ASSESSMENT — PATIENT HEALTH QUESTIONNAIRE - PHQ9: CLINICAL INTERPRETATION OF PHQ2 SCORE: 0

## 2020-01-23 NOTE — ASSESSMENT & PLAN NOTE
This is a 79-year-old female accompanied by her daughter, Odessa.  She is doing well after interventional radiology procedure.  It appears that the coil was in place.  No concern with the aneurysm.  She is here to follow-up on a recent CT scan that showed the following:    IMPRESSION:     1.  Exam limited by artifact due to coiling material/stent material involving the right cavernous ICA region.  2.  There is diffuse ectasia/aneurysm of the cavernous left internal carotid artery.  3.  There is otherwise no focal intracranial aneurysm identified.  4.  There is diffuse atrophy.  5.  There is scattered low attenuation in the white matter most suggestive of microangiopathic ischemic change.  Differential includes demyelination and gliosis.    Daughter mentions that there is been no mental changes over the past 3 to 6 months.  She is concerned about the white matter changes.

## 2020-01-23 NOTE — ASSESSMENT & PLAN NOTE
Has had double vision in both of her eyes for a while.  Associated right eye drooping.  Has not been evaluated by ophthalmology.

## 2020-01-23 NOTE — PROGRESS NOTES
Subjective:   Kaila Choudhary is a 79 y.o. female here today for history of carotid artery aneurysm, double vision in bilateral eyes with eyelid drooping on the right side, shaking, anxiety, mild cognitive impairment and hypercholesterolemia.    Carotid artery aneurysm (CMS-HCC)  This is a 79-year-old female accompanied by her daughter, Odessa.  She is doing well after interventional radiology procedure.  It appears that the coil was in place.  No concern with the aneurysm.  She is here to follow-up on a recent CT scan that showed the following:    IMPRESSION:     1.  Exam limited by artifact due to coiling material/stent material involving the right cavernous ICA region.  2.  There is diffuse ectasia/aneurysm of the cavernous left internal carotid artery.  3.  There is otherwise no focal intracranial aneurysm identified.  4.  There is diffuse atrophy.  5.  There is scattered low attenuation in the white matter most suggestive of microangiopathic ischemic change.  Differential includes demyelination and gliosis.    Daughter mentions that there is been no mental changes over the past 3 to 6 months.  She is concerned about the white matter changes.    Double vision with both eyes open  Has had double vision in both of her eyes for a while.  Associated right eye drooping.  Has not been evaluated by ophthalmology.    Shaking  Complains of intermittent shaking daily.  This is been going on for least 6 months to 1 year.    Anxiety  Chronic condition.  Anxiety is still daily.  She is on Lexapro 10 mg.  Needs a refill today.    Mild cognitive impairment  Continues to take Aricept daily.  Denies any side effects.  No change in mental status.    Hyperlipidemia with target LDL less than 100  Chronic condition.  Takes Lipitor 40 mg daily.  Requesting a refill.         Current medicines (including changes today)  Current Outpatient Medications   Medication Sig Dispense Refill   • levothyroxine (SYNTHROID) 88 MCG Tab Take  "1 Tab by mouth Every morning on an empty stomach. 90 Tab 2   • atorvastatin (LIPITOR) 40 MG Tab Take 1 Tab by mouth every day. 90 Tab 2   • escitalopram (LEXAPRO) 10 MG Tab Take 1 Tab by mouth every day. 90 Tab 2   • donepezil (ARICEPT) 10 MG tablet Take 1 Tab by mouth every evening for 180 days. 90 Tab 2   • Calcium Carbonate-Vitamin D (CALCIUM 600/VITAMIN D PO) Take  by mouth.     • aspirin (ASA) 81 MG CHEW Take 81 mg by mouth every morning.       No current facility-administered medications for this visit.      She  has a past medical history of Cancer (HCC) (2013), CATARACT, Cerebral aneurysm, Cholesterol blood decreased, Dental disorder, High cholesterol, Hyperlipidemia, Hypothyroidism (2/13/2013), OSTEOPOROSIS (2/13/2013), Pacemaker, Pain (12/05/2019), S/P cardiac catheterization, Short-term memory loss, and Thyroid disease.    Social History and Family History were reviewed and updated.    ROS   No chest pain, no shortness of breath, no abdominal pain and all other systems were reviewed and are negative.       Objective:     /60 (BP Location: Right arm, Patient Position: Sitting, BP Cuff Size: Adult)   Pulse 60   Temp 36.6 °C (97.9 °F) (Temporal)   Resp 14   Ht 1.549 m (5' 1\")   Wt 69.4 kg (153 lb)   SpO2 94%  Body mass index is 28.91 kg/m².   Physical Exam:  Constitutional: Alert, no distress.  Skin: Warm, dry, good turgor, no rashes in visible areas.  Eye: Equal, round and reactive, conjunctiva clear, right upper eyelid with notable drooping compared to the left.  ENMT: Lips without lesions, good dentition, oropharynx clear.  Neck: Trachea midline, no masses.   Lymph: No cervical or supraclavicular lymphadenopathy  Respiratory: Unlabored respiratory effort, lungs appear clear, no wheezes.  Cardiovascular: Normal S1, S2, no murmur, no edema.  Psych: Alert and oriented x3, normal affect and mood.        Assessment and Plan:   The following treatment plan was discussed    1. Carotid artery aneurysm " (HCC)  Chronic condition.  Stable.  Recent intervention by IR.  Refer to neurology.  - REFERRAL TO NEUROLOGY    2. White matter abnormality on MRI of brain  Chronic condition.  Reviewed results.  Offered MRI but will refer to neurology.  Advise white matter likely secondary to ischemic changes.  - REFERRAL TO NEUROLOGY    3. Double vision with both eyes open  New condition noted in chart but chronic.  Refer to ophthalmology for evaluation.  - REFERRAL TO OPHTHALMOLOGY    4. Drooping eyelid, right  Chronic condition.  Stable.  Referred to ophthalmology and neurology for evaluation.  - REFERRAL TO NEUROLOGY  - REFERRAL TO OPHTHALMOLOGY    5. Anxiety  Chronic condition.  Stable.  Renew Lexapro 10 mg.  - escitalopram (LEXAPRO) 10 MG Tab; Take 1 Tab by mouth every day.  Dispense: 90 Tab; Refill: 2    6. Hypothyroidism due to acquired atrophy of thyroid  Chronic condition.  Status unknown.  Renewed Synthroid 88 mcg.  Follow-up will order labs.  - levothyroxine (SYNTHROID) 88 MCG Tab; Take 1 Tab by mouth Every morning on an empty stomach.  Dispense: 90 Tab; Refill: 2    7. Hyperlipidemia with target LDL less than 100  Chronic condition.  Likely stable.  Renewed Lipitor 40 mg.  Follow-up will order labs.  - atorvastatin (LIPITOR) 40 MG Tab; Take 1 Tab by mouth every day.  Dispense: 90 Tab; Refill: 2    8. Shaking  New condition noted in chart but chronic.  Likely benign tremor.  Refer to neurology.  - REFERRAL TO NEUROLOGY    9. Mild cognitive impairment  Chronic condition.  Stable.  No change of cognitive behavior.  Renewed Aricept 10 mg daily.  - donepezil (ARICEPT) 10 MG tablet; Take 1 Tab by mouth every evening for 180 days.  Dispense: 90 Tab; Refill: 2      Followup: Return in about 3 months (around 4/23/2020).    Please note that this dictation was created using voice recognition software. I have made every reasonable attempt to correct obvious errors, but I expect that there are errors of grammar and possibly content  that I did not discover before finalizing the note.

## 2020-01-24 DIAGNOSIS — H53.2 DOUBLE VISION WITH BOTH EYES OPEN: ICD-10-CM

## 2020-01-24 DIAGNOSIS — H02.401 DROOPING EYELID, RIGHT: ICD-10-CM

## 2020-03-16 ENCOUNTER — APPOINTMENT (OUTPATIENT)
Dept: MEDICAL GROUP | Facility: MEDICAL CENTER | Age: 80
End: 2020-03-16
Payer: MEDICARE

## 2020-06-02 DIAGNOSIS — E78.5 HYPERLIPIDEMIA WITH TARGET LDL LESS THAN 100: ICD-10-CM

## 2020-06-02 DIAGNOSIS — F41.9 ANXIETY: ICD-10-CM

## 2020-06-02 DIAGNOSIS — E03.4 HYPOTHYROIDISM DUE TO ACQUIRED ATROPHY OF THYROID: ICD-10-CM

## 2020-06-02 RX ORDER — LEVOTHYROXINE SODIUM 88 UG/1
88 TABLET ORAL
Qty: 90 TAB | Refills: 1 | Status: SHIPPED | OUTPATIENT
Start: 2020-06-02 | End: 2020-08-18 | Stop reason: SDUPTHER

## 2020-06-02 RX ORDER — ATORVASTATIN CALCIUM 40 MG/1
40 TABLET, FILM COATED ORAL DAILY
Qty: 90 TAB | Refills: 1 | Status: SHIPPED | OUTPATIENT
Start: 2020-06-02 | End: 2020-08-18 | Stop reason: SDUPTHER

## 2020-06-02 RX ORDER — ESCITALOPRAM OXALATE 10 MG/1
10 TABLET ORAL DAILY
Qty: 90 TAB | Refills: 1 | Status: SHIPPED | OUTPATIENT
Start: 2020-06-02 | End: 2020-08-18 | Stop reason: SDUPTHER

## 2020-06-02 NOTE — TELEPHONE ENCOUNTER
Received request via: Patient    Was the patient seen in the last year in this department? Yes    Patients next Appointment:  Visit date not found     Requested Prescriptions     Pending Prescriptions Disp Refills   • escitalopram (LEXAPRO) 10 MG Tab 90 Tab 2     Sig: Take 1 Tab by mouth every day.   • atorvastatin (LIPITOR) 40 MG Tab 90 Tab 2     Sig: Take 1 Tab by mouth every day.   • levothyroxine (SYNTHROID) 88 MCG Tab 90 Tab 2     Sig: Take 1 Tab by mouth Every morning on an empty stomach.

## 2020-08-18 ENCOUNTER — TELEMEDICINE (OUTPATIENT)
Dept: MEDICAL GROUP | Facility: MEDICAL CENTER | Age: 80
End: 2020-08-18
Payer: MEDICARE

## 2020-08-18 VITALS — HEIGHT: 61 IN | WEIGHT: 156 LBS | BODY MASS INDEX: 29.45 KG/M2

## 2020-08-18 DIAGNOSIS — E78.5 HYPERLIPIDEMIA WITH TARGET LDL LESS THAN 100: ICD-10-CM

## 2020-08-18 DIAGNOSIS — E03.4 HYPOTHYROIDISM DUE TO ACQUIRED ATROPHY OF THYROID: ICD-10-CM

## 2020-08-18 DIAGNOSIS — D22.9 ATYPICAL NEVUS: ICD-10-CM

## 2020-08-18 DIAGNOSIS — F41.9 ANXIETY: ICD-10-CM

## 2020-08-18 DIAGNOSIS — G31.84 MILD COGNITIVE IMPAIRMENT: ICD-10-CM

## 2020-08-18 DIAGNOSIS — D22.4 ATYPICAL NEVUS OF NECK: ICD-10-CM

## 2020-08-18 PROCEDURE — 99214 OFFICE O/P EST MOD 30 MIN: CPT | Mod: 95,CR | Performed by: PHYSICIAN ASSISTANT

## 2020-08-18 RX ORDER — ATORVASTATIN CALCIUM 40 MG/1
40 TABLET, FILM COATED ORAL DAILY
Qty: 90 TAB | Refills: 2 | Status: SHIPPED | OUTPATIENT
Start: 2020-08-18 | End: 2022-03-25 | Stop reason: SDUPTHER

## 2020-08-18 RX ORDER — ATORVASTATIN CALCIUM 40 MG/1
40 TABLET, FILM COATED ORAL DAILY
COMMUNITY
End: 2020-08-18

## 2020-08-18 RX ORDER — LEVOTHYROXINE SODIUM 88 UG/1
88 TABLET ORAL
Qty: 90 TAB | Refills: 2 | Status: SHIPPED | OUTPATIENT
Start: 2020-08-18 | End: 2021-01-29 | Stop reason: SDUPTHER

## 2020-08-18 RX ORDER — ESCITALOPRAM OXALATE 10 MG/1
10 TABLET ORAL DAILY
Qty: 90 TAB | Refills: 2 | Status: SHIPPED | OUTPATIENT
Start: 2020-08-18 | End: 2020-10-14

## 2020-08-18 RX ORDER — ESCITALOPRAM OXALATE 10 MG/1
10 TABLET ORAL DAILY
COMMUNITY
End: 2020-08-18

## 2020-08-18 RX ORDER — DONEPEZIL HYDROCHLORIDE 10 MG/1
10 TABLET, FILM COATED ORAL EVERY EVENING
Qty: 90 TAB | Refills: 2 | Status: SHIPPED | OUTPATIENT
Start: 2020-08-18 | End: 2020-08-30

## 2020-08-18 RX ORDER — DONEPEZIL HYDROCHLORIDE 5 MG/1
5 TABLET, FILM COATED ORAL NIGHTLY
COMMUNITY
End: 2020-08-18

## 2020-08-18 NOTE — PROGRESS NOTES
Subjective:   Kaila Choudhary is a 80 y.o. female here today for atypical nevus of the neck, anxiety, mild cognitive impairment, hypothyroidism and hyperlipidemia.    This encounter was conducted via Zoom .   Verbal consent was obtained. Patient's identity was verified.      Atypical nevus of neck  This is a pleasant 80-year-old female on a virtual visit with her daughter present, Pastora.  Complains of a bump on back of her neck above the hairline.  One point she was seen by dermatology for a similar lesion that was burned off.  She would like a referral to dermatology.  Complains of the bump being uncomfortable.    Anxiety  Chronic condition.  Takes Lexapro 20 mg daily.  Requesting a refill.  Her daughter would like the medication sent locally as she will run out of the medication and stopped taking it.  She will throw away the bottle.     Mild cognitive impairment  Also has been placed on Aricept 10 mg at nighttime.  Has also stopped that medication.    Hypothyroidism  Chronic condition.  Takes Synthroid 88 mcg daily.  Needs a renewal today.  Last TSH value in normal range.    Hyperlipidemia with target LDL less than 100  Chronic condition.  Requesting refill today Lipitor 40 mg.  Last cholesterol profile showed her LDL in the 50s.         Current medicines (including changes today)  Current Outpatient Medications   Medication Sig Dispense Refill   • escitalopram (LEXAPRO) 10 MG Tab Take 1 Tab by mouth every day. 90 Tab 2   • atorvastatin (LIPITOR) 40 MG Tab Take 1 Tab by mouth every day. 90 Tab 2   • levothyroxine (SYNTHROID) 88 MCG Tab Take 1 Tab by mouth Every morning on an empty stomach. 90 Tab 2   • donepezil (ARICEPT) 10 MG tablet Take 1 Tab by mouth every evening for 180 days. 90 Tab 2   • Calcium Carbonate-Vitamin D (CALCIUM 600/VITAMIN D PO) Take  by mouth.     • aspirin (ASA) 81 MG CHEW Take 81 mg by mouth every morning.       No current facility-administered medications for this visit.      She   "has a past medical history of Cancer (HCC) (2013), CATARACT, Cerebral aneurysm, Cholesterol blood decreased, Dental disorder, High cholesterol, Hyperlipidemia, Hypothyroidism (2/13/2013), OSTEOPOROSIS (2/13/2013), Pacemaker, Pain (12/05/2019), S/P cardiac catheterization, Short-term memory loss, and Thyroid disease.    Social History and Family History were reviewed and updated.    ROS   No chest pain, no shortness of breath, no abdominal pain and all other systems were reviewed and are negative.       Objective:     Ht 1.549 m (5' 1\")   Wt 70.8 kg (156 lb)  Body mass index is 29.48 kg/m².   Physical Exam:  Constitutional: Alert, no distress.  Skin: Warm, dry, good turgor, no rashes in visible areas.  Eye: Equal, round and reactive, conjunctiva clear, lids normal.  ENMT: Lips without lesions, good dentition, oropharynx clear.  Neck: Trachea midline, no masses.   Lymph: No cervical or supraclavicular lymphadenopathy  Respiratory: Unlabored respiratory effort.  Psych: Alert and oriented x3, normal affect and mood.        Assessment and Plan:   The following treatment plan was discussed    1. Atypical nevus of neck  Acute, new onset condition.  Refer to dermatology for evaluation and treatment.  - REFERRAL TO DERMATOLOGY    2. Anxiety  Chronic condition.  Appears stable.  Renew Lexapro as directed.  - escitalopram (LEXAPRO) 10 MG Tab; Take 1 Tab by mouth every day.  Dispense: 90 Tab; Refill: 2    3. Hyperlipidemia with target LDL less than 100  Chronic condition.  Stable.  Renew Lipitor as directed.  - atorvastatin (LIPITOR) 40 MG Tab; Take 1 Tab by mouth every day.  Dispense: 90 Tab; Refill: 2    4. Hypothyroidism due to acquired atrophy of thyroid  Chronic condition.  Stable.  Renewed levothyroxine as directed.  - levothyroxine (SYNTHROID) 88 MCG Tab; Take 1 Tab by mouth Every morning on an empty stomach.  Dispense: 90 Tab; Refill: 2    5. Mild cognitive impairment  Chronic condition.  Stable.  Renewed Aricept to " take in the evening as directed.  - donepezil (ARICEPT) 10 MG tablet; Take 1 Tab by mouth every evening for 180 days.  Dispense: 90 Tab; Refill: 2      Followup: Return in 6 months (on 2/18/2021), or if symptoms worsen or fail to improve.    Please note that this dictation was created using voice recognition software. I have made every reasonable attempt to correct obvious errors, but I expect that there are errors of grammar and possibly content that I did not discover before finalizing the note.

## 2020-08-18 NOTE — ASSESSMENT & PLAN NOTE
Chronic condition.  Takes Synthroid 88 mcg daily.  Needs a renewal today.  Last TSH value in normal range.

## 2020-08-18 NOTE — ASSESSMENT & PLAN NOTE
Chronic condition.  Requesting refill today Lipitor 40 mg.  Last cholesterol profile showed her LDL in the 50s.

## 2020-08-18 NOTE — ASSESSMENT & PLAN NOTE
Chronic condition.  Takes Lexapro 20 mg daily.  Requesting a refill.  Her daughter would like the medication sent locally as she will run out of the medication and stopped taking it.  She will throw away the bottle.

## 2020-08-18 NOTE — ASSESSMENT & PLAN NOTE
This is a pleasant 80-year-old female on a virtual visit with her daughter present, Pastora.  Complains of a bump on back of her neck above the hairline.  One point she was seen by dermatology for a similar lesion that was burned off.  She would like a referral to dermatology.  Complains of the bump being uncomfortable.

## 2020-08-30 DIAGNOSIS — G31.84 MILD COGNITIVE IMPAIRMENT: ICD-10-CM

## 2020-08-30 RX ORDER — DONEPEZIL HYDROCHLORIDE 10 MG/1
TABLET, FILM COATED ORAL
Qty: 90 TAB | Refills: 2 | Status: SHIPPED | OUTPATIENT
Start: 2020-08-30 | End: 2021-03-12 | Stop reason: SDUPTHER

## 2020-08-31 DIAGNOSIS — H91.90 HEARING DIFFICULTY, UNSPECIFIED LATERALITY: ICD-10-CM

## 2020-09-29 ENCOUNTER — TELEMEDICINE (OUTPATIENT)
Dept: MEDICAL GROUP | Facility: MEDICAL CENTER | Age: 80
End: 2020-09-29
Payer: MEDICARE

## 2020-09-29 VITALS
HEIGHT: 61 IN | RESPIRATION RATE: 16 BRPM | DIASTOLIC BLOOD PRESSURE: 87 MMHG | BODY MASS INDEX: 29.45 KG/M2 | WEIGHT: 156 LBS | SYSTOLIC BLOOD PRESSURE: 130 MMHG | HEART RATE: 66 BPM

## 2020-09-29 DIAGNOSIS — Z95.0 CARDIAC PACEMAKER IN SITU: ICD-10-CM

## 2020-09-29 DIAGNOSIS — I44.2 COMPLETE HEART BLOCK (HCC): ICD-10-CM

## 2020-09-29 DIAGNOSIS — R06.02 SHORTNESS OF BREATH: ICD-10-CM

## 2020-09-29 PROCEDURE — 99214 OFFICE O/P EST MOD 30 MIN: CPT | Mod: 95,CR | Performed by: PHYSICIAN ASSISTANT

## 2020-09-29 NOTE — ASSESSMENT & PLAN NOTE
This is a pleasant 80-year-old female accompanied by her daughter Pastora at home.  She is doing a virtual visit.  She sent a [employer] message complaining of shortness of breath over the past year or more.  In 2016 had a pacemaker in place for syncope.  She had complete heart block.  She was followed initially by Dr. Sanches and then Dr. Sarah Mar.  She would like to reestablish care with Dr. Mar if needed.  She states her shortness of breath comes after walking.  She does not walk much but may get up and do some chores and then feel winded.  She denies any chest pain.  No syncopal episodes since 2016.  Daughter is also concerned about her bilateral hand tremors.

## 2020-09-29 NOTE — PROGRESS NOTES
Subjective:   Kaila Choudhary is a 80 y.o. female here today for shortness of breath.    This evaluation was conducted via Zoom using secure and encrypted videoconferencing technology. The patient was in a private location in the [facility].    The patient's identity was confirmed and verbal consent was obtained for this virtual visit.      Shortness of breath  This is a pleasant 80-year-old female accompanied by her daughter Pastora at home.  She is doing a virtual visit.  She sent a "[employer] message complaining of shortness of breath over the past year or more.  In 2016 had a pacemaker in place for syncope.  She had complete heart block.  She was followed initially by Dr. Sanches and then Dr. Sarah Mar.  She would like to reestablish care with Dr. Mar if needed.  She states her shortness of breath comes after walking.  She does not walk much but may get up and do some chores and then feel winded.  She denies any chest pain.  No syncopal episodes since 2016.  Daughter is also concerned about her bilateral hand tremors.       Current medicines (including changes today)  Current Outpatient Medications   Medication Sig Dispense Refill   • donepezil (ARICEPT) 10 MG tablet TAKE 1 TABLET BY MOUTH  EVERY EVENING 90 Tab 2   • escitalopram (LEXAPRO) 10 MG Tab Take 1 Tab by mouth every day. 90 Tab 2   • atorvastatin (LIPITOR) 40 MG Tab Take 1 Tab by mouth every day. 90 Tab 2   • levothyroxine (SYNTHROID) 88 MCG Tab Take 1 Tab by mouth Every morning on an empty stomach. 90 Tab 2   • Calcium Carbonate-Vitamin D (CALCIUM 600/VITAMIN D PO) Take  by mouth.     • aspirin (ASA) 81 MG CHEW Take 81 mg by mouth every morning.       No current facility-administered medications for this visit.      She  has a past medical history of Cancer (HCC) (2013), CATARACT, Cerebral aneurysm, Cholesterol blood decreased, Dental disorder, High cholesterol, Hyperlipidemia, Hypothyroidism (2/13/2013), OSTEOPOROSIS (2/13/2013),  "Pacemaker, Pain (12/05/2019), S/P cardiac catheterization, Short-term memory loss, and Thyroid disease.    Social History and Family History were reviewed and updated.    ROS   No chest pain, no shortness of breath, no abdominal pain and all other systems were reviewed and are negative.       Objective:     /87   Pulse 66   Resp 16   Ht 1.549 m (5' 1\")   Wt 70.8 kg (156 lb)  Body mass index is 29.48 kg/m².   Physical Exam:  Constitutional: Alert, no distress.  Skin: Warm, dry, good turgor, no rashes in visible areas.  Eye: Equal, round and reactive, conjunctiva clear, lids normal.  ENMT: Lips without lesions, good dentition, oropharynx clear.  Neck: Trachea midline, no masses.   Lymph: No cervical or supraclavicular lymphadenopathy  Respiratory: Unlabored respiratory effort.  Psych: Alert and oriented x3, normal affect and mood.        Assessment and Plan:   The following treatment plan was discussed    1. Shortness of breath  New condition noted in chart but chronic.  Status post pacemaker.  No recent follow-up.  Urged to be followed by cardiology so referred her to Dr. Sarah Mar who is currently at [facility].  Advised to contact me through my chart with any concerns.  Would first rule out a cardiac concern and if no findings we will then look at possible pulmonary concern.  This point I did not think it was necessary to do a EKG or any other work-up as she is had these similar symptoms for greater than 1 year.  She may also need a Holter monitor.  - REFERRAL TO CARDIOLOGY General Cardiology MD    2. Cardiac pacemaker in situ  Symptomatic with shortness of breath.  Referred to cardiology and Dr. Mar at [facility] for evaluation.  - REFERRAL TO CARDIOLOGY General Cardiology MD      Followup: Return if symptoms worsen or fail to improve.    Please note that this dictation was created using voice recognition software. I have made every reasonable attempt to correct obvious errors, but " I expect that there are errors of grammar and possibly content that I did not discover before finalizing the note.

## 2020-10-14 DIAGNOSIS — F41.9 ANXIETY: ICD-10-CM

## 2020-10-14 RX ORDER — ESCITALOPRAM OXALATE 10 MG/1
TABLET ORAL
Qty: 90 TAB | Refills: 0 | Status: SHIPPED | OUTPATIENT
Start: 2020-10-14 | End: 2020-11-01 | Stop reason: SDUPTHER

## 2020-10-14 NOTE — TELEPHONE ENCOUNTER
Received request via: Pharmacy    Was the patient seen in the last year in this department? Yes    Does the patient have an active prescription (recently filled or refills available) for medication(s) requested? No     Requested Prescriptions     Pending Prescriptions Disp Refills   • escitalopram (LEXAPRO) 10 MG Tab [Pharmacy Med Name: ESCITALOPRAM  10MG  TAB] 90 Tab 2     Sig: TAKE 1 TABLET BY MOUTH  DAILY

## 2020-11-01 DIAGNOSIS — F41.9 ANXIETY: ICD-10-CM

## 2020-11-01 RX ORDER — ESCITALOPRAM OXALATE 10 MG/1
TABLET ORAL
Qty: 90 TAB | Refills: 1 | Status: SHIPPED | OUTPATIENT
Start: 2020-11-01 | End: 2021-03-11 | Stop reason: SDUPTHER

## 2021-01-11 DIAGNOSIS — Z23 NEED FOR VACCINATION: ICD-10-CM

## 2021-01-29 DIAGNOSIS — E03.4 HYPOTHYROIDISM DUE TO ACQUIRED ATROPHY OF THYROID: ICD-10-CM

## 2021-01-30 NOTE — TELEPHONE ENCOUNTER
Received request via: Pharmacy    Was the patient seen in the last year in this department? Yes    Patients next Appointment:  Visit date not found     Requested Prescriptions     Pending Prescriptions Disp Refills   • levothyroxine (SYNTHROID) 88 MCG Tab 100 Tab 0     Sig: Take 1 Tab by mouth Every morning on an empty stomach.

## 2021-01-31 RX ORDER — LEVOTHYROXINE SODIUM 88 UG/1
88 TABLET ORAL
Qty: 90 TAB | Refills: 0 | Status: SHIPPED | OUTPATIENT
Start: 2021-01-31 | End: 2021-04-13

## 2021-02-22 ENCOUNTER — TELEPHONE (OUTPATIENT)
Dept: CARDIOLOGY | Facility: MEDICAL CENTER | Age: 81
End: 2021-02-22

## 2021-02-22 NOTE — TELEPHONE ENCOUNTER
Pt being seen by [facility].   Pt Remotely released to [facility] 10/2020- pt no longer being remotely followed by our clinic.

## 2021-03-11 DIAGNOSIS — F41.9 ANXIETY: ICD-10-CM

## 2021-03-11 RX ORDER — ESCITALOPRAM OXALATE 20 MG/1
TABLET ORAL
Qty: 90 TABLET | Refills: 2 | Status: SHIPPED
Start: 2021-03-11 | End: 2022-03-25

## 2021-03-12 DIAGNOSIS — G31.84 MILD COGNITIVE IMPAIRMENT: ICD-10-CM

## 2021-03-12 RX ORDER — DONEPEZIL HYDROCHLORIDE 10 MG/1
TABLET, FILM COATED ORAL
Qty: 90 TABLET | Refills: 2 | Status: SHIPPED | OUTPATIENT
Start: 2021-03-12 | End: 2021-05-12 | Stop reason: SDUPTHER

## 2021-04-13 DIAGNOSIS — E03.4 HYPOTHYROIDISM DUE TO ACQUIRED ATROPHY OF THYROID: ICD-10-CM

## 2021-04-13 RX ORDER — LEVOTHYROXINE SODIUM 88 UG/1
TABLET ORAL
Qty: 90 TABLET | Refills: 0 | Status: SHIPPED | OUTPATIENT
Start: 2021-04-13 | End: 2021-07-11

## 2021-04-13 NOTE — TELEPHONE ENCOUNTER
Received request via: Pharmacy    Was the patient seen in the last year in this department? Yes    Patients next Appointment:  Visit date not found     Requested Prescriptions     Pending Prescriptions Disp Refills   • levothyroxine (SYNTHROID) 88 MCG Tab [Pharmacy Med Name: LEVOTHYROXINE  88MCG  TAB] 90 tablet 0     Sig: TAKE 1 TABLET BY MOUTH IN  THE MORNING ON AN EMPTY  STOMACH

## 2021-05-07 DIAGNOSIS — R06.02 SHORTNESS OF BREATH: ICD-10-CM

## 2021-05-07 DIAGNOSIS — G31.84 MILD COGNITIVE IMPAIRMENT: ICD-10-CM

## 2021-05-07 RX ORDER — CHOLESTYRAMINE 4 G/9G
POWDER, FOR SUSPENSION ORAL
COMMUNITY
Start: 2021-04-01 | End: 2022-03-25

## 2021-05-12 DIAGNOSIS — G31.84 MILD COGNITIVE IMPAIRMENT: ICD-10-CM

## 2021-05-12 RX ORDER — DONEPEZIL HYDROCHLORIDE 10 MG/1
TABLET, FILM COATED ORAL
Qty: 100 TABLET | Refills: 2 | Status: SHIPPED | OUTPATIENT
Start: 2021-05-12 | End: 2022-03-25

## 2021-05-12 NOTE — TELEPHONE ENCOUNTER
Received request via: Pharmacy    Was the patient seen in the last year in this department? Yes    Does the patient have an active prescription (recently filled or refills available) for medication(s) requested? Yes. Pharmacy requesting resubmission of Rx to dispense 100 days supply per insurance    Requested Prescriptions     Pending Prescriptions Disp Refills   • donepezil (ARICEPT) 10 MG tablet 90 tablet 2     Sig: TAKE 1 TABLET BY MOUTH  EVERY EVENING

## 2021-05-20 ENCOUNTER — HOSPITAL ENCOUNTER (OUTPATIENT)
Dept: PULMONOLOGY | Facility: MEDICAL CENTER | Age: 81
End: 2021-05-20
Attending: PHYSICIAN ASSISTANT
Payer: MEDICARE

## 2021-05-20 DIAGNOSIS — R06.02 SHORTNESS OF BREATH: ICD-10-CM

## 2021-05-20 PROCEDURE — 94060 EVALUATION OF WHEEZING: CPT

## 2021-05-20 RX ORDER — ALBUTEROL SULFATE 90 UG/1
2 AEROSOL, METERED RESPIRATORY (INHALATION)
Status: DISCONTINUED | OUTPATIENT
Start: 2021-05-20 | End: 2021-05-21 | Stop reason: HOSPADM

## 2021-05-20 ASSESSMENT — PULMONARY FUNCTION TESTS
FEV1/FVC: 80
FEV1/FVC_PERCENT_PREDICTED: 76
FVC_PREDICTED: 2.26
FEV1/FVC_PERCENT_PREDICTED: 109
FVC_PERCENT_PREDICTED: 97
FEV1: 1.83
FEV1/FVC_PERCENT_LLN: 65.54
FEV1/FVC: 82.88
FVC_PERCENT_PREDICTED: 100
FVC_LLN: 1.88
FEV1/FVC_PERCENT_LLN: 64.54
FEV1/FVC_PERCENT_CHANGE: 0
FEV1/FVC_PREDICTED: 77.29
FEV1_PERCENT_CHANGE: -2
FEV1/FVC_PERCENT_PREDICTED: 105
FEV1_PERCENT_PREDICTED: 106
FVC: 2.28
FEV1_LLN: 1.44
FVC_LLN: 1.88
FEV1/FVC: 82.81
FEV1_PREDICTED: 1.72
FEV1: 1.83
FEV1/FVC: 80.22
FEV1_PERCENT_CHANGE: 0
FEV1_PERCENT_PREDICTED: 105
FEV1/FVC_PERCENT_PREDICTED: 103
FVC: 2.21
FEV1/FVC_PERCENT_PREDICTED: 107
FEV1/FVC_PERCENT_CHANGE: 3
FEV1_LLN: 1.44

## 2021-05-24 PROCEDURE — 94060 EVALUATION OF WHEEZING: CPT | Mod: 26 | Performed by: INTERNAL MEDICINE

## 2021-05-24 NOTE — PROCEDURES
DATE OF SERVICE:  05/20/2021     PULMONARY FUNCTION TEST INTERPRETATION    This exam is performed with a primary diagnosis of shortness of breath to help establish a pre-test probability of the patient’s diagnostic findings.    The Flow Volume Loop is of sufficient quality and the volume-time graph demonstrates an adequate exhalation to provide a confident interpretation.    The FEV1/FVC ratio is normal by GOLD criteria and confidence interval data indicating an absence of irreversible obstructive lung disease (i.e. no evidence of COPD).  This is accompanied by a normal FEV1 and FVC based on predicted values. After administration of 2 puffs of albuterol, the patient did not achieve a significant bronchodilator response (12% and 200 ml in FEV1 or FVC).    Impression:   1. No evidence of airflow obstruction   2. Normal spirometry. This does not rule out reactive airways disease.  Suggest clinical correlation.    ______________________________  MD CHANEL Leonard/ZEYAD/ELISABETH    DD:  05/23/2021 14:34  DT:  05/23/2021 16:49    Job#:  [id number]

## 2021-05-27 ENCOUNTER — APPOINTMENT (OUTPATIENT)
Dept: RADIOLOGY | Facility: MEDICAL CENTER | Age: 81
DRG: 204 | End: 2021-05-27
Attending: EMERGENCY MEDICINE
Payer: MEDICARE

## 2021-05-27 ENCOUNTER — HOSPITAL ENCOUNTER (INPATIENT)
Facility: MEDICAL CENTER | Age: 81
LOS: 2 days | DRG: 204 | End: 2021-06-01
Attending: EMERGENCY MEDICINE | Admitting: STUDENT IN AN ORGANIZED HEALTH CARE EDUCATION/TRAINING PROGRAM
Payer: MEDICARE

## 2021-05-27 DIAGNOSIS — R06.02 SHORTNESS OF BREATH: ICD-10-CM

## 2021-05-27 DIAGNOSIS — G31.84 MILD COGNITIVE IMPAIRMENT: ICD-10-CM

## 2021-05-27 DIAGNOSIS — R53.1 WEAKNESS: ICD-10-CM

## 2021-05-27 DIAGNOSIS — M25.552 HIP PAIN, ACUTE, LEFT: ICD-10-CM

## 2021-05-27 DIAGNOSIS — M25.571 ACUTE RIGHT ANKLE PAIN: ICD-10-CM
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
## 2021-05-27 PROBLEM — R06.00 DYSPNEA: Status: ACTIVE | Noted: 2020-09-29

## 2021-05-27 LAB
ALBUMIN SERPL BCP-MCNC: 3.9 G/DL (ref 3.2–4.9)
ALBUMIN/GLOB SERPL: 1.3 G/DL
ALP SERPL-CCNC: 97 U/L (ref 30–99)
ALT SERPL-CCNC: 11 U/L (ref 2–50)
ANION GAP SERPL CALC-SCNC: 15 MMOL/L (ref 7–16)
APPEARANCE UR: CLEAR
AST SERPL-CCNC: 13 U/L (ref 12–45)
BASOPHILS # BLD AUTO: 0.6 % (ref 0–1.8)
BASOPHILS # BLD: 0.05 K/UL (ref 0–0.12)
BILIRUB SERPL-MCNC: 1.5 MG/DL (ref 0.1–1.5)
BILIRUB UR QL STRIP.AUTO: NEGATIVE
BUN SERPL-MCNC: 11 MG/DL (ref 8–22)
CALCIUM SERPL-MCNC: 8.7 MG/DL (ref 8.4–10.2)
CHLORIDE SERPL-SCNC: 98 MMOL/L (ref 96–112)
CO2 SERPL-SCNC: 27 MMOL/L (ref 20–33)
COLOR UR: YELLOW
CREAT SERPL-MCNC: 1.06 MG/DL (ref 0.5–1.4)
CRP SERPL HS-MCNC: 6.51 MG/DL (ref 0–0.75)
D DIMER PPP IA.FEU-MCNC: 2.01 UG/ML (FEU) (ref 0–0.5)
EKG IMPRESSION: NORMAL
EOSINOPHIL # BLD AUTO: 0.03 K/UL (ref 0–0.51)
EOSINOPHIL NFR BLD: 0.4 % (ref 0–6.9)
ERYTHROCYTE [DISTWIDTH] IN BLOOD BY AUTOMATED COUNT: 45.5 FL (ref 35.9–50)
ERYTHROCYTE [SEDIMENTATION RATE] IN BLOOD BY WESTERGREN METHOD: 62 MM/HOUR (ref 0–30)
GLOBULIN SER CALC-MCNC: 3 G/DL (ref 1.9–3.5)
GLUCOSE SERPL-MCNC: 116 MG/DL (ref 65–99)
GLUCOSE UR STRIP.AUTO-MCNC: NEGATIVE MG/DL
GRAN CASTS #/AREA URNS LPF: ABNORMAL /LPF
HCT VFR BLD AUTO: 38.7 % (ref 37–47)
HGB BLD-MCNC: 13 G/DL (ref 12–16)
HYALINE CASTS #/AREA URNS LPF: >10 /LPF
IMM GRANULOCYTES # BLD AUTO: 0.06 K/UL (ref 0–0.11)
IMM GRANULOCYTES NFR BLD AUTO: 0.7 % (ref 0–0.9)
KETONES UR STRIP.AUTO-MCNC: ABNORMAL MG/DL
LACTATE BLD-SCNC: 1.3 MMOL/L (ref 0.5–2)
LACTATE BLD-SCNC: 2.1 MMOL/L (ref 0.5–2)
LACTATE BLD-SCNC: 2.1 MMOL/L (ref 0.5–2)
LEUKOCYTE ESTERASE UR QL STRIP.AUTO: NEGATIVE
LYMPHOCYTES # BLD AUTO: 0.87 K/UL (ref 1–4.8)
LYMPHOCYTES NFR BLD: 10.2 % (ref 22–41)
MCH RBC QN AUTO: 31.6 PG (ref 27–33)
MCHC RBC AUTO-ENTMCNC: 33.6 G/DL (ref 33.6–35)
MCV RBC AUTO: 93.9 FL (ref 81.4–97.8)
MICRO URNS: ABNORMAL
MONOCYTES # BLD AUTO: 1.16 K/UL (ref 0–0.85)
MONOCYTES NFR BLD AUTO: 13.5 % (ref 0–13.4)
MUCOUS THREADS #/AREA URNS HPF: ABNORMAL /HPF
NEUTROPHILS # BLD AUTO: 6.4 K/UL (ref 2–7.15)
NEUTROPHILS NFR BLD: 74.6 % (ref 44–72)
NITRITE UR QL STRIP.AUTO: NEGATIVE
NRBC # BLD AUTO: 0 K/UL
NRBC BLD-RTO: 0 /100 WBC
NT-PROBNP SERPL IA-MCNC: 1101 PG/ML (ref 0–125)
PH UR STRIP.AUTO: 5.5 [PH] (ref 5–8)
PLATELET # BLD AUTO: 159 K/UL (ref 164–446)
PMV BLD AUTO: 11.7 FL (ref 9–12.9)
POTASSIUM SERPL-SCNC: 3.5 MMOL/L (ref 3.6–5.5)
PROT SERPL-MCNC: 6.9 G/DL (ref 6–8.2)
PROT UR QL STRIP: NEGATIVE MG/DL
RBC # BLD AUTO: 4.12 M/UL (ref 4.2–5.4)
RBC # URNS HPF: ABNORMAL /HPF
RBC UR QL AUTO: ABNORMAL
SODIUM SERPL-SCNC: 140 MMOL/L (ref 135–145)
SP GR UR STRIP.AUTO: 1.01
T4 FREE SERPL-MCNC: 0.96 NG/DL (ref 0.93–1.7)
TROPONIN T SERPL-MCNC: <6 NG/L (ref 6–19)
TSH SERPL DL<=0.005 MIU/L-ACNC: 15.15 UIU/ML (ref 0.38–5.33)
UNIDENT CRYS URNS QL MICRO: ABNORMAL /HPF
URATE SERPL-MCNC: 4.4 MG/DL (ref 1.9–8.2)
URATE SERPL-MCNC: 4.8 MG/DL (ref 1.9–8.2)
WBC # BLD AUTO: 8.6 K/UL (ref 4.8–10.8)
WBC #/AREA URNS HPF: ABNORMAL /HPF

## 2021-05-27 PROCEDURE — 86200 CCP ANTIBODY: CPT

## 2021-05-27 PROCEDURE — 99285 EMERGENCY DEPT VISIT HI MDM: CPT

## 2021-05-27 PROCEDURE — 85652 RBC SED RATE AUTOMATED: CPT

## 2021-05-27 PROCEDURE — 71275 CT ANGIOGRAPHY CHEST: CPT | Mod: ME

## 2021-05-27 PROCEDURE — 86038 ANTINUCLEAR ANTIBODIES: CPT

## 2021-05-27 PROCEDURE — 87040 BLOOD CULTURE FOR BACTERIA: CPT | Mod: 91

## 2021-05-27 PROCEDURE — 84439 ASSAY OF FREE THYROXINE: CPT

## 2021-05-27 PROCEDURE — 93005 ELECTROCARDIOGRAM TRACING: CPT | Performed by: EMERGENCY MEDICINE

## 2021-05-27 PROCEDURE — 84443 ASSAY THYROID STIM HORMONE: CPT

## 2021-05-27 PROCEDURE — 99220 PR INITIAL OBSERVATION CARE,LEVL III: CPT | Performed by: INTERNAL MEDICINE

## 2021-05-27 PROCEDURE — 71045 X-RAY EXAM CHEST 1 VIEW: CPT

## 2021-05-27 PROCEDURE — 96372 THER/PROPH/DIAG INJ SC/IM: CPT

## 2021-05-27 PROCEDURE — 84550 ASSAY OF BLOOD/URIC ACID: CPT

## 2021-05-27 PROCEDURE — 700117 HCHG RX CONTRAST REV CODE 255: Performed by: EMERGENCY MEDICINE

## 2021-05-27 PROCEDURE — G0378 HOSPITAL OBSERVATION PER HR: HCPCS

## 2021-05-27 PROCEDURE — 84484 ASSAY OF TROPONIN QUANT: CPT

## 2021-05-27 PROCEDURE — 85025 COMPLETE CBC W/AUTO DIFF WBC: CPT

## 2021-05-27 PROCEDURE — U0005 INFEC AGEN DETEC AMPLI PROBE: HCPCS

## 2021-05-27 PROCEDURE — 96374 THER/PROPH/DIAG INJ IV PUSH: CPT

## 2021-05-27 PROCEDURE — 700105 HCHG RX REV CODE 258: Performed by: EMERGENCY MEDICINE

## 2021-05-27 PROCEDURE — U0003 INFECTIOUS AGENT DETECTION BY NUCLEIC ACID (DNA OR RNA); SEVERE ACUTE RESPIRATORY SYNDROME CORONAVIRUS 2 (SARS-COV-2) (CORONAVIRUS DISEASE [COVID-19]), AMPLIFIED PROBE TECHNIQUE, MAKING USE OF HIGH THROUGHPUT TECHNOLOGIES AS DESCRIBED BY CMS-2020-01-R: HCPCS

## 2021-05-27 PROCEDURE — 81001 URINALYSIS AUTO W/SCOPE: CPT

## 2021-05-27 PROCEDURE — 36415 COLL VENOUS BLD VENIPUNCTURE: CPT

## 2021-05-27 PROCEDURE — 73502 X-RAY EXAM HIP UNI 2-3 VIEWS: CPT | Mod: LT

## 2021-05-27 PROCEDURE — 85379 FIBRIN DEGRADATION QUANT: CPT

## 2021-05-27 PROCEDURE — A9270 NON-COVERED ITEM OR SERVICE: HCPCS | Performed by: INTERNAL MEDICINE

## 2021-05-27 PROCEDURE — 700111 HCHG RX REV CODE 636 W/ 250 OVERRIDE (IP): Performed by: INTERNAL MEDICINE

## 2021-05-27 PROCEDURE — 80053 COMPREHEN METABOLIC PANEL: CPT

## 2021-05-27 PROCEDURE — 86140 C-REACTIVE PROTEIN: CPT

## 2021-05-27 PROCEDURE — 73610 X-RAY EXAM OF ANKLE: CPT | Mod: RT

## 2021-05-27 PROCEDURE — 700102 HCHG RX REV CODE 250 W/ 637 OVERRIDE(OP): Performed by: INTERNAL MEDICINE

## 2021-05-27 PROCEDURE — 83605 ASSAY OF LACTIC ACID: CPT

## 2021-05-27 PROCEDURE — 87086 URINE CULTURE/COLONY COUNT: CPT

## 2021-05-27 PROCEDURE — 83880 ASSAY OF NATRIURETIC PEPTIDE: CPT

## 2021-05-27 RX ORDER — ONDANSETRON 4 MG/1
4 TABLET, ORALLY DISINTEGRATING ORAL EVERY 4 HOURS PRN
Status: DISCONTINUED | OUTPATIENT
Start: 2021-05-27 | End: 2021-06-01 | Stop reason: HOSPADM

## 2021-05-27 RX ORDER — ONDANSETRON 2 MG/ML
4 INJECTION INTRAMUSCULAR; INTRAVENOUS EVERY 4 HOURS PRN
Status: DISCONTINUED | OUTPATIENT
Start: 2021-05-27 | End: 2021-06-01 | Stop reason: HOSPADM

## 2021-05-27 RX ORDER — OXYCODONE HYDROCHLORIDE 5 MG/1
2.5 TABLET ORAL
Status: DISCONTINUED | OUTPATIENT
Start: 2021-05-27 | End: 2021-06-01 | Stop reason: HOSPADM

## 2021-05-27 RX ORDER — SODIUM CHLORIDE, SODIUM LACTATE, POTASSIUM CHLORIDE, CALCIUM CHLORIDE 600; 310; 30; 20 MG/100ML; MG/100ML; MG/100ML; MG/100ML
1000 INJECTION, SOLUTION INTRAVENOUS ONCE
Status: COMPLETED | OUTPATIENT
Start: 2021-05-27 | End: 2021-05-27

## 2021-05-27 RX ORDER — POTASSIUM CHLORIDE 20 MEQ/1
20 TABLET, EXTENDED RELEASE ORAL ONCE
Status: COMPLETED | OUTPATIENT
Start: 2021-05-27 | End: 2021-05-27

## 2021-05-27 RX ORDER — ATORVASTATIN CALCIUM 40 MG/1
40 TABLET, FILM COATED ORAL DAILY
Status: DISCONTINUED | OUTPATIENT
Start: 2021-05-28 | End: 2021-06-01 | Stop reason: HOSPADM

## 2021-05-27 RX ORDER — MORPHINE SULFATE 4 MG/ML
2 INJECTION, SOLUTION INTRAMUSCULAR; INTRAVENOUS
Status: DISCONTINUED | OUTPATIENT
Start: 2021-05-27 | End: 2021-05-31

## 2021-05-27 RX ORDER — OXYCODONE HYDROCHLORIDE 5 MG/1
5 TABLET ORAL
Status: DISCONTINUED | OUTPATIENT
Start: 2021-05-27 | End: 2021-05-31

## 2021-05-27 RX ORDER — ASPIRIN 81 MG/1
81 TABLET, CHEWABLE ORAL EVERY MORNING
Status: DISCONTINUED | OUTPATIENT
Start: 2021-05-28 | End: 2021-06-01 | Stop reason: HOSPADM

## 2021-05-27 RX ORDER — BISACODYL 10 MG
10 SUPPOSITORY, RECTAL RECTAL
Status: DISCONTINUED | OUTPATIENT
Start: 2021-05-27 | End: 2021-06-01 | Stop reason: HOSPADM

## 2021-05-27 RX ORDER — POLYETHYLENE GLYCOL 3350 17 G/17G
1 POWDER, FOR SOLUTION ORAL
Status: DISCONTINUED | OUTPATIENT
Start: 2021-05-27 | End: 2021-06-01 | Stop reason: HOSPADM

## 2021-05-27 RX ORDER — AMOXICILLIN 250 MG
2 CAPSULE ORAL 2 TIMES DAILY
Status: DISCONTINUED | OUTPATIENT
Start: 2021-05-27 | End: 2021-05-31

## 2021-05-27 RX ORDER — ESCITALOPRAM OXALATE 10 MG/1
20 TABLET ORAL DAILY
Status: DISCONTINUED | OUTPATIENT
Start: 2021-05-28 | End: 2021-06-01 | Stop reason: HOSPADM

## 2021-05-27 RX ORDER — LEVOTHYROXINE SODIUM 88 UG/1
88 TABLET ORAL
Status: DISCONTINUED | OUTPATIENT
Start: 2021-05-28 | End: 2021-05-28

## 2021-05-27 RX ORDER — DONEPEZIL HYDROCHLORIDE 5 MG/1
10 TABLET, FILM COATED ORAL NIGHTLY
Status: DISCONTINUED | OUTPATIENT
Start: 2021-05-27 | End: 2021-06-01 | Stop reason: HOSPADM

## 2021-05-27 RX ORDER — ACETAMINOPHEN 325 MG/1
650 TABLET ORAL EVERY 6 HOURS PRN
Status: DISCONTINUED | OUTPATIENT
Start: 2021-05-27 | End: 2021-06-01 | Stop reason: HOSPADM

## 2021-05-27 RX ADMIN — DOCUSATE SODIUM 50 MG AND SENNOSIDES 8.6 MG 2 TABLET: 8.6; 5 TABLET, FILM COATED ORAL at 21:38

## 2021-05-27 RX ADMIN — OXYCODONE HYDROCHLORIDE 5 MG: 5 TABLET ORAL at 20:11

## 2021-05-27 RX ADMIN — POTASSIUM CHLORIDE 20 MEQ: 1500 TABLET, EXTENDED RELEASE ORAL at 21:37

## 2021-05-27 RX ADMIN — IOHEXOL 60 ML: 350 INJECTION, SOLUTION INTRAVENOUS at 18:56

## 2021-05-27 RX ADMIN — DONEPEZIL HYDROCHLORIDE 10 MG: 5 TABLET, FILM COATED ORAL at 21:37

## 2021-05-27 RX ADMIN — SODIUM CHLORIDE, POTASSIUM CHLORIDE, SODIUM LACTATE AND CALCIUM CHLORIDE 1000 ML: 600; 310; 30; 20 INJECTION, SOLUTION INTRAVENOUS at 17:49

## 2021-05-27 RX ADMIN — MORPHINE SULFATE 2 MG: 4 INJECTION INTRAVENOUS at 21:23

## 2021-05-27 ASSESSMENT — LIFESTYLE VARIABLES
ALCOHOL_USE: NO
TOTAL SCORE: 0
CONSUMPTION TOTAL: NEGATIVE
HAVE YOU EVER FELT YOU SHOULD CUT DOWN ON YOUR DRINKING: NO
TOTAL SCORE: 0
CONSUMPTION TOTAL: INCOMPLETE
DO YOU DRINK ALCOHOL: NO
HAVE PEOPLE ANNOYED YOU BY CRITICIZING YOUR DRINKING: NO
EVER FELT BAD OR GUILTY ABOUT YOUR DRINKING: NO
EVER HAD A DRINK FIRST THING IN THE MORNING TO STEADY YOUR NERVES TO GET RID OF A HANGOVER: NO
TOTAL SCORE: 0
HAVE PEOPLE ANNOYED YOU BY CRITICIZING YOUR DRINKING: NO
ON A TYPICAL DAY WHEN YOU DRINK ALCOHOL HOW MANY DRINKS DO YOU HAVE: 0
AVERAGE NUMBER OF DAYS PER WEEK YOU HAVE A DRINK CONTAINING ALCOHOL: 0
EVER HAD A DRINK FIRST THING IN THE MORNING TO STEADY YOUR NERVES TO GET RID OF A HANGOVER: NO
EVER FELT BAD OR GUILTY ABOUT YOUR DRINKING: NO
HAVE YOU EVER FELT YOU SHOULD CUT DOWN ON YOUR DRINKING: NO
HOW MANY TIMES IN THE PAST YEAR HAVE YOU HAD 5 OR MORE DRINKS IN A DAY: 0

## 2021-05-27 ASSESSMENT — ENCOUNTER SYMPTOMS
NERVOUS/ANXIOUS: 1
VOMITING: 0
ABDOMINAL PAIN: 0
WEAKNESS: 1
SHORTNESS OF BREATH: 1
DEPRESSION: 0
FEVER: 0
MEMORY LOSS: 1
DIZZINESS: 0
HEADACHES: 0
CHILLS: 0
MYALGIAS: 0
TREMORS: 1
NAUSEA: 0

## 2021-05-27 ASSESSMENT — COGNITIVE AND FUNCTIONAL STATUS - GENERAL
TOILETING: A LOT
TURNING FROM BACK TO SIDE WHILE IN FLAT BAD: A LITTLE
HELP NEEDED FOR BATHING: A LITTLE
WALKING IN HOSPITAL ROOM: A LOT
MOBILITY SCORE: 15
SUGGESTED CMS G CODE MODIFIER MOBILITY: CK
STANDING UP FROM CHAIR USING ARMS: A LOT
CLIMB 3 TO 5 STEPS WITH RAILING: A LOT
SUGGESTED CMS G CODE MODIFIER DAILY ACTIVITY: CK
MOVING FROM LYING ON BACK TO SITTING ON SIDE OF FLAT BED: A LITTLE
DRESSING REGULAR UPPER BODY CLOTHING: A LITTLE
DAILY ACTIVITIY SCORE: 18
MOVING TO AND FROM BED TO CHAIR: A LITTLE
DRESSING REGULAR LOWER BODY CLOTHING: A LOT

## 2021-05-27 ASSESSMENT — PAIN DESCRIPTION - PAIN TYPE
TYPE: ACUTE PAIN
TYPE: ACUTE PAIN

## 2021-05-27 ASSESSMENT — PATIENT HEALTH QUESTIONNAIRE - PHQ9
1. LITTLE INTEREST OR PLEASURE IN DOING THINGS: NOT AT ALL
SUM OF ALL RESPONSES TO PHQ9 QUESTIONS 1 AND 2: 0
2. FEELING DOWN, DEPRESSED, IRRITABLE, OR HOPELESS: NOT AT ALL

## 2021-05-27 NOTE — ED NOTES
Pt to triage via w/c w daughter and son. C/o generalized weakness progressively over past month. Pt c/o R ankle pain, low back pain; denies trauma. Pt lives alone, and her son and daughter are concerned that she 'is deteriorating' and progressively worsening incl weakness, dyspnea on exertion, incr anxiety. VSS

## 2021-05-28 ENCOUNTER — APPOINTMENT (OUTPATIENT)
Dept: RADIOLOGY | Facility: MEDICAL CENTER | Age: 81
DRG: 204 | End: 2021-05-28
Attending: STUDENT IN AN ORGANIZED HEALTH CARE EDUCATION/TRAINING PROGRAM
Payer: MEDICARE

## 2021-05-28 ENCOUNTER — APPOINTMENT (OUTPATIENT)
Dept: NEUROLOGY | Facility: MEDICAL CENTER | Age: 81
End: 2021-05-28
Attending: PSYCHIATRY & NEUROLOGY
Payer: MEDICARE

## 2021-05-28 ENCOUNTER — APPOINTMENT (OUTPATIENT)
Dept: CARDIOLOGY | Facility: MEDICAL CENTER | Age: 81
DRG: 204 | End: 2021-05-28
Attending: INTERNAL MEDICINE
Payer: MEDICARE

## 2021-05-28 PROBLEM — M25.562 KNEE PAIN, LEFT: Status: ACTIVE | Noted: 2021-05-28

## 2021-05-28 LAB
ALBUMIN SERPL BCP-MCNC: 3.2 G/DL (ref 3.2–4.9)
ALBUMIN/GLOB SERPL: 1.3 G/DL
ALP SERPL-CCNC: 81 U/L (ref 30–99)
ALT SERPL-CCNC: 8 U/L (ref 2–50)
ANION GAP SERPL CALC-SCNC: 11 MMOL/L (ref 7–16)
AST SERPL-CCNC: 12 U/L (ref 12–45)
BASOPHILS # BLD AUTO: 0.7 % (ref 0–1.8)
BASOPHILS # BLD: 0.05 K/UL (ref 0–0.12)
BILIRUB SERPL-MCNC: 1 MG/DL (ref 0.1–1.5)
BUN SERPL-MCNC: 11 MG/DL (ref 8–22)
CALCIUM SERPL-MCNC: 8 MG/DL (ref 8.4–10.2)
CHLORIDE SERPL-SCNC: 100 MMOL/L (ref 96–112)
CO2 SERPL-SCNC: 28 MMOL/L (ref 20–33)
CREAT SERPL-MCNC: 0.92 MG/DL (ref 0.5–1.4)
EOSINOPHIL # BLD AUTO: 0.06 K/UL (ref 0–0.51)
EOSINOPHIL NFR BLD: 0.8 % (ref 0–6.9)
ERYTHROCYTE [DISTWIDTH] IN BLOOD BY AUTOMATED COUNT: 45.5 FL (ref 35.9–50)
GLOBULIN SER CALC-MCNC: 2.5 G/DL (ref 1.9–3.5)
GLUCOSE SERPL-MCNC: 102 MG/DL (ref 65–99)
HCT VFR BLD AUTO: 33.3 % (ref 37–47)
HGB BLD-MCNC: 11.3 G/DL (ref 12–16)
IMM GRANULOCYTES # BLD AUTO: 0.04 K/UL (ref 0–0.11)
IMM GRANULOCYTES NFR BLD AUTO: 0.5 % (ref 0–0.9)
LACTATE BLD-SCNC: 1.1 MMOL/L (ref 0.5–2)
LV EJECT FRACT  99904: 65
LV EJECT FRACT MOD 2C 99903: 76.51
LV EJECT FRACT MOD 4C 99902: 68.46
LV EJECT FRACT MOD BP 99901: 71.14
LYMPHOCYTES # BLD AUTO: 1.86 K/UL (ref 1–4.8)
LYMPHOCYTES NFR BLD: 24.6 % (ref 22–41)
MCH RBC QN AUTO: 31.8 PG (ref 27–33)
MCHC RBC AUTO-ENTMCNC: 33.9 G/DL (ref 33.6–35)
MCV RBC AUTO: 93.8 FL (ref 81.4–97.8)
MONOCYTES # BLD AUTO: 1.23 K/UL (ref 0–0.85)
MONOCYTES NFR BLD AUTO: 16.2 % (ref 0–13.4)
NEUTROPHILS # BLD AUTO: 4.33 K/UL (ref 2–7.15)
NEUTROPHILS NFR BLD: 57.2 % (ref 44–72)
NRBC # BLD AUTO: 0 K/UL
NRBC BLD-RTO: 0 /100 WBC
PLATELET # BLD AUTO: 134 K/UL (ref 164–446)
PMV BLD AUTO: 11.9 FL (ref 9–12.9)
POTASSIUM SERPL-SCNC: 3.3 MMOL/L (ref 3.6–5.5)
PROT SERPL-MCNC: 5.7 G/DL (ref 6–8.2)
RBC # BLD AUTO: 3.55 M/UL (ref 4.2–5.4)
SARS-COV-2 RNA RESP QL NAA+PROBE: NOTDETECTED
SODIUM SERPL-SCNC: 139 MMOL/L (ref 135–145)
SPECIMEN SOURCE: NORMAL
WBC # BLD AUTO: 7.6 K/UL (ref 4.8–10.8)

## 2021-05-28 PROCEDURE — 85025 COMPLETE CBC W/AUTO DIFF WBC: CPT

## 2021-05-28 PROCEDURE — A9270 NON-COVERED ITEM OR SERVICE: HCPCS | Performed by: INTERNAL MEDICINE

## 2021-05-28 PROCEDURE — 93306 TTE W/DOPPLER COMPLETE: CPT

## 2021-05-28 PROCEDURE — 97163 PT EVAL HIGH COMPLEX 45 MIN: CPT

## 2021-05-28 PROCEDURE — 93306 TTE W/DOPPLER COMPLETE: CPT | Mod: 26 | Performed by: INTERNAL MEDICINE

## 2021-05-28 PROCEDURE — 700102 HCHG RX REV CODE 250 W/ 637 OVERRIDE(OP): Performed by: INTERNAL MEDICINE

## 2021-05-28 PROCEDURE — 80053 COMPREHEN METABOLIC PANEL: CPT

## 2021-05-28 PROCEDURE — 96372 THER/PROPH/DIAG INJ SC/IM: CPT

## 2021-05-28 PROCEDURE — 92523 SPEECH SOUND LANG COMPREHEN: CPT

## 2021-05-28 PROCEDURE — A9270 NON-COVERED ITEM OR SERVICE: HCPCS | Performed by: STUDENT IN AN ORGANIZED HEALTH CARE EDUCATION/TRAINING PROGRAM

## 2021-05-28 PROCEDURE — 700111 HCHG RX REV CODE 636 W/ 250 OVERRIDE (IP): Performed by: INTERNAL MEDICINE

## 2021-05-28 PROCEDURE — G0378 HOSPITAL OBSERVATION PER HR: HCPCS

## 2021-05-28 PROCEDURE — 36415 COLL VENOUS BLD VENIPUNCTURE: CPT

## 2021-05-28 PROCEDURE — 99226 PR SUBSEQUENT OBSERVATION CARE,LEVEL III: CPT | Performed by: STUDENT IN AN ORGANIZED HEALTH CARE EDUCATION/TRAINING PROGRAM

## 2021-05-28 PROCEDURE — 73562 X-RAY EXAM OF KNEE 3: CPT | Mod: LT

## 2021-05-28 PROCEDURE — 700102 HCHG RX REV CODE 250 W/ 637 OVERRIDE(OP): Performed by: STUDENT IN AN ORGANIZED HEALTH CARE EDUCATION/TRAINING PROGRAM

## 2021-05-28 PROCEDURE — 83605 ASSAY OF LACTIC ACID: CPT

## 2021-05-28 RX ORDER — LEVOTHYROXINE SODIUM 0.1 MG/1
100 TABLET ORAL
Status: DISCONTINUED | OUTPATIENT
Start: 2021-05-29 | End: 2021-06-01 | Stop reason: HOSPADM

## 2021-05-28 RX ORDER — POTASSIUM CHLORIDE 20 MEQ/1
40 TABLET, EXTENDED RELEASE ORAL ONCE
Status: COMPLETED | OUTPATIENT
Start: 2021-05-28 | End: 2021-05-28

## 2021-05-28 RX ADMIN — ENOXAPARIN SODIUM 40 MG: 40 INJECTION SUBCUTANEOUS at 05:47

## 2021-05-28 RX ADMIN — OXYCODONE HYDROCHLORIDE 2.5 MG: 5 TABLET ORAL at 20:35

## 2021-05-28 RX ADMIN — POTASSIUM CHLORIDE 40 MEQ: 1500 TABLET, EXTENDED RELEASE ORAL at 08:49

## 2021-05-28 RX ADMIN — MORPHINE SULFATE 2 MG: 4 INJECTION INTRAVENOUS at 10:21

## 2021-05-28 RX ADMIN — DONEPEZIL HYDROCHLORIDE 10 MG: 5 TABLET, FILM COATED ORAL at 20:35

## 2021-05-28 RX ADMIN — OXYCODONE HYDROCHLORIDE 5 MG: 5 TABLET ORAL at 08:49

## 2021-05-28 RX ADMIN — ATORVASTATIN CALCIUM 40 MG: 40 TABLET, FILM COATED ORAL at 05:33

## 2021-05-28 RX ADMIN — ESCITALOPRAM OXALATE 20 MG: 10 TABLET ORAL at 05:33

## 2021-05-28 RX ADMIN — DOCUSATE SODIUM 50 MG AND SENNOSIDES 8.6 MG 2 TABLET: 8.6; 5 TABLET, FILM COATED ORAL at 05:34

## 2021-05-28 RX ADMIN — OXYCODONE HYDROCHLORIDE 2.5 MG: 5 TABLET ORAL at 12:39

## 2021-05-28 RX ADMIN — ASPIRIN 81 MG CHEWABLE TABLET 81 MG: 81 TABLET CHEWABLE at 05:33

## 2021-05-28 RX ADMIN — LEVOTHYROXINE SODIUM 88 MCG: 88 TABLET ORAL at 05:33

## 2021-05-28 ASSESSMENT — ENCOUNTER SYMPTOMS
WHEEZING: 0
HEMOPTYSIS: 0
SHORTNESS OF BREATH: 1
DIZZINESS: 1
FALLS: 1
COUGH: 0
BACK PAIN: 1

## 2021-05-28 ASSESSMENT — PAIN DESCRIPTION - PAIN TYPE
TYPE: ACUTE PAIN

## 2021-05-28 ASSESSMENT — COGNITIVE AND FUNCTIONAL STATUS - GENERAL
MOVING TO AND FROM BED TO CHAIR: A LITTLE
TURNING FROM BACK TO SIDE WHILE IN FLAT BAD: A LITTLE
MOVING FROM LYING ON BACK TO SITTING ON SIDE OF FLAT BED: A LOT
CLIMB 3 TO 5 STEPS WITH RAILING: TOTAL
WALKING IN HOSPITAL ROOM: A LOT
MOBILITY SCORE: 13
SUGGESTED CMS G CODE MODIFIER MOBILITY: CL
STANDING UP FROM CHAIR USING ARMS: A LOT

## 2021-05-28 ASSESSMENT — GAIT ASSESSMENTS
ASSISTIVE DEVICE: FRONT WHEEL WALKER
GAIT LEVEL OF ASSIST: MODERATE ASSIST
DISTANCE (FEET): 4
DEVIATION: BRADYKINETIC;SHUFFLED GAIT

## 2021-05-28 NOTE — PROGRESS NOTES
Per Dr. Do, do ambulatory home O2 test. Discussed with patient and daughter. Patient is adamant that she does not want to get up and walk around. Educated on importance of mobility. Dr. Do notified. Will continue to encourage mobility.

## 2021-05-28 NOTE — ED NOTES
Report given to Amish JJ. Pt to be admitted to hospital for further evaluation and treatment. Pt and family accepting of admission. TO be transferred by DIONICIO Ballesteros

## 2021-05-28 NOTE — ED NOTES
Imaging completed. Mini cath procedure explained and performed. Privacy maintained for pt. Urine to lab.  Pt tolerated procedure well. Warm blanket provided. Will continue to monitor.

## 2021-05-28 NOTE — CARE PLAN
The patient is Watcher - Medium risk of patient condition declining or worsening    Shift Goals  Clinical Goals: pain control  Patient Goals: Get some rest  Family Goals: To get well    Progress made toward(s) clinical / shift goals:  Discussed importance of mobility and turning in bed to prevent skin breakdown and encourage O2 intake. Educated on fall risk and precautions. Non skid socks on patient. Max assist with FWW. Commode at bedside.   Problem: Skin Integrity  Goal: Skin integrity is maintained or improved  Outcome: Progressing     Problem: Fall Risk  Goal: Patient will remain free from falls  Outcome: Progressing       Patient is not progressing towards the following goals:      Problem: Pain - Standard  Goal: Alleviation of pain or a reduction in pain to the patient’s comfort goal  Outcome: Not Progressing   Pt's pain remains at 5/10 despite medication administration. Pt does report pain being tolerable when she doesn't move. Importance of mobility discussed.

## 2021-05-28 NOTE — ASSESSMENT & PLAN NOTE
Recent fall  X ray of L knee: mod to large joint effusion  I discussed with Orthopedics Dr. Meza. Drainage is not indicated as this will inc chance of bleeding and infection.  PT/OT

## 2021-05-28 NOTE — PROGRESS NOTES
2RN's Skin assessment -Pt skin from head to toe was not within defined limits. Pt had Right ankle swollen,bruise and painful to touch. Varicose vein visible in BLE.Pt complained of pain to left Hip as well. No open wounds noted.

## 2021-05-28 NOTE — ED NOTES
ERP at bedside. Pt agrees with plan of care discussed by ERP. AIDET acknowledged with patient. IV established. Blood sent to lab. Medicinal interventions carried out per ERP orders. Adriánrbalbir in low position, side rail up for pt safety. Call light within reach. Will continue to monitor.

## 2021-05-28 NOTE — THERAPY
Physical Therapy   Initial Evaluation     Patient Name: Kaila Choudhary  Age:  81 y.o., Sex:  female  Medical Record #: [id number]  Today's Date: 5/28/2021     Precautions: (P) Fall Risk    Assessment  Patient is 81 y.o. female with a diagnosis of SOB, fall at home.Pt lives at home alone and is mod active with some help from daughter.Acute PT needed to improve bed mob,transfers,ambulation and stairs      Plan    Recommend Physical Therapy 5 times per week until therapy goals are met for the following treatments:  Bed Mobility, Gait Training, Neuro Re-Education / Balance, Stair Training, Therapeutic Activities and Therapeutic Exercises    05/28/21 1300   Total Time Spent   Total Time Spent (Mins) 45   Charge Group   PT Evaluation PT Evaluation High   Initial Contact Note    Initial Contact Note Order Received and Verified, Physical Therapy Evaluation in Progress with Full Report to Follow.   Precautions   Precautions Fall Risk   Pain 0 - 10 Group   Pain Rating Scale (NPRS) 5   Therapist Pain Assessment 5   Prior Living Situation   Prior Services Intermittent Physical Support for ADL Per Family   Housing / Facility 2 Story House   Steps Into Home 3   Steps In Home 12   Rail Both Rail (Steps into Home)   Elevator Yes   Equipment Owned Front-Wheel Walker   Lives with - Patient's Self Care Capacity Alone and Able to Care For Self   Prior Level of Functional Mobility   Bed Mobility Independent   Transfer Status Independent   Ambulation Independent   Distance Ambulation (Feet)   (community amb)   Assistive Devices Used None   Stairs Independent   History of Falls   History of Falls Yes   Cognition    Cognition / Consciousness X   Comments confused   Passive ROM Lower Body   Passive ROM Lower Body X   Active ROM Lower Body    Active ROM Lower Body  X   Strength Lower Body   Lower Body Strength  X   Coordination Lower Body    Coordination Lower Body  WDL   Balance Assessment   Sitting Balance (Static) Fair +   Sitting  Balance (Dynamic) Fair   Standing Balance (Static) Poor   Standing Balance (Dynamic) Poor   Weight Shift Sitting Poor   Weight Shift Standing Poor   Gait Analysis   Gait Level Of Assist Moderate Assist   Assistive Device Front Wheel Walker   Distance (Feet) 4   # of Times Distance was Traveled 2   Deviation Bradykinetic;Shuffled Gait   # of Stairs Climbed 0   Weight Bearing Status full   Bed Mobility    Supine to Sit Minimal Assist   Sit to Supine Moderate Assist   Scooting Moderate Assist   Functional Mobility   Sit to Stand Moderate Assist   Bed, Chair, Wheelchair Transfer Moderate Assist   Toilet Transfers Moderate Assist   Transfer Method Stand Pivot   How much difficulty does the patient currently have...   Turning over in bed (including adjusting bedclothes, sheets and blankets)? 3   Sitting down on and standing up from a chair with arms (e.g., wheelchair, bedside commode, etc.) 2   Moving from lying on back to sitting on the side of the bed? 3   How much help from another person does the patient currently need...   Moving to and from a bed to a chair (including a wheelchair)? 2   Need to walk in a hospital room? 2   Climbing 3-5 steps with a railing? 1   6 clicks Mobility Score 13   Activity Tolerance   Sitting Edge of Bed 10   Standing 2x 3 mins   Patient / Family Goals    Patient / Family Goal #1 not stated   Short Term Goals    Short Term Goal # 1 Pt to be I with bed mob in 6 V   Short Term Goal # 2 Pt to be S with transfers in 6 V so can go home   Short Term Goal # 3 Pt to be S with ambulation x 200 feet in 6 V so can go home   Short Term Goal # 4 Pt to be S x 3 stairs in 6 V so can get into house   Problem List    Problems Impaired Bed Mobility;Impaired Ambulation;Impaired Transfers;Functional ROM Deficit;Functional Strength Deficit;Impaired Balance;Decreased Activity Tolerance   Anticipated Discharge Equipment and Recommendations   DC Equipment Recommendations Unable to determine at this time    Discharge Recommendations   (will need SNF unless she makes a dramatic improvement)   Interdisciplinary Plan of Care Collaboration   IDT Collaboration with  Nursing   Session Information   Date / Session Number  5/28-1 1/5 6/3       DC Equipment Recommendations: (P) Unable to determine at this time  Discharge Recommendations: (P)  (will need SNF unless she makes a dramatic improvement)

## 2021-05-28 NOTE — PROGRESS NOTES
[facility] Medicine Daily Progress Note    Date of Service  5/28/2021    Chief Complaint  81 y.o. female with history of hypothyoidism, hyperlipidemia, cerebral aneurysm, pacemaker, who was admitted 5/27/2021 with increased shortness of breath, weakness and R ankle, L knee and L hip pain.     Hospital Course  Per daughter, Patient lives herself, and she is still able to complete her ADLs as her house is fully handicap accessible.  Patient had recent PFTs with normal FEV1/FVC ratio and has been seen by outpatient cardiology and pulmonology.    CTA: no pulmonary embolus, mild emphysematous changes  X-rays of the hip and ankle are negative for acute fracture.  TSH: 15, free T4 0.96  Echo: EF 65%, Normal right ventricular size and systolic function, Mild MR and TR.  ProBNP 1101    PT/OT  Ambulatory O2 test  Inc levothyroxine to 100mcg daily    Interval Problem Update  Reports L knee pain. Patient reports she had a fall recently. She has walker and cane at home to assist walking. Lives alone, but daughter lives closeby.  Noted worsening sob after exertions.   Denies fever, chills, chest pain, nausea, vomiting, abdominal pain     All Data, Medication data reviewed.  Case discussed with nursing, CM,  nurse, pharmacy in IDT rounds.     Consultants/Specialty  none    Code Status  DNAR/DNI    Disposition  TBD    Review of Systems  Review of Systems   Respiratory: Positive for shortness of breath. Negative for cough, hemoptysis and wheezing.    Musculoskeletal: Positive for back pain, falls and joint pain.   Neurological: Positive for dizziness.   All other systems reviewed and are negative.       Physical Exam  Temp:  [36.5 °C (97.7 °F)-36.8 °C (98.2 °F)] 36.5 °C (97.7 °F)  Pulse:  [60-74] 60  Resp:  [14-18] 14  BP: (125-189)/(43-98) 160/57  SpO2:  [93 %-97 %] 94 %    Physical Exam  Vitals and nursing note reviewed.   Constitutional:       General: She is not in acute distress.     Appearance: Normal appearance. She is  ill-appearing.   HENT:      Head: Normocephalic and atraumatic.      Nose: Nose normal.      Mouth/Throat:      Mouth: Mucous membranes are dry.      Pharynx: Oropharynx is clear.   Eyes:      Extraocular Movements: Extraocular movements intact.      Conjunctiva/sclera: Conjunctivae normal.      Pupils: Pupils are equal, round, and reactive to light.   Cardiovascular:      Rate and Rhythm: Normal rate and regular rhythm.      Pulses: Normal pulses.      Heart sounds: Normal heart sounds.   Pulmonary:      Effort: Pulmonary effort is normal. No respiratory distress.      Breath sounds: Normal breath sounds. No wheezing or rales.   Abdominal:      General: Abdomen is flat. Bowel sounds are normal. There is no distension.      Palpations: Abdomen is soft.      Tenderness: There is no abdominal tenderness.   Musculoskeletal:         General: No tenderness. Normal range of motion.      Cervical back: Normal range of motion and neck supple.      Right lower leg: Edema (trace) present.      Left lower leg: Edema (trace) present.   Skin:     General: Skin is warm and dry.      Capillary Refill: Capillary refill takes 2 to 3 seconds.   Neurological:      General: No focal deficit present.      Mental Status: She is alert and oriented to person, place, and time. Mental status is at baseline.   Psychiatric:         Mood and Affect: Mood normal.         Behavior: Behavior normal.         Fluids    Intake/Output Summary (Last 24 hours) at 5/28/2021 1208  Last data filed at 5/28/2021 0900  Gross per 24 hour   Intake 240 ml   Output --   Net 240 ml       Laboratory  Recent Labs     05/27/21 1712 05/28/21  0029   WBC 8.6 7.6   RBC 4.12* 3.55*   HEMOGLOBIN 13.0 11.3*   HEMATOCRIT 38.7 33.3*   MCV 93.9 93.8   MCH 31.6 31.8   MCHC 33.6 33.9   RDW 45.5 45.5   PLATELETCT 159* 134*   MPV 11.7 11.9     Recent Labs     05/27/21 1712 05/28/21  0029   SODIUM 140 139   POTASSIUM 3.5* 3.3*   CHLORIDE 98 100   CO2 27 28   GLUCOSE 116* 102*    BUN 11 11   CREATININE 1.06 0.92   CALCIUM 8.7 8.0*                   Imaging  DX-KNEE 3 VIEWS LEFT   Final Result      1.  Tricompartment degenerative change which involves the medial femorotibial articulation to the greatest degree.      2.  Moderate to large joint effusion.      EC-ECHOCARDIOGRAM COMPLETE W/O CONT   Final Result      CT-CTA CHEST PULMONARY ARTERY W/ RECONS   Final Result      1.  No evidence of pulmonary embolus.      2.  Mild emphysematous change of the lungs.      3.  Atherosclerotic vascular calcification.      DX-HIP-COMPLETE - UNILATERAL 2+ LEFT   Final Result      No evidence of left hip fracture or degenerative change.      DX-ANKLE 3+ VIEWS RIGHT   Final Result      No evidence of acute fracture or dislocation.      DX-CHEST-PORTABLE (1 VIEW)   Final Result      Cardiomegaly.           Assessment/Plan  * Dyspnea- (present on admission)  Assessment & Plan  Acute on chronic, symptoms have been going on for months  Patient satting well on room air  CTA negative for pulmonary embolism, shows some emphysematous changes  Outpatient PFTs within normal limits  BNP mildly elevated, is not fluid overloaded on exam  Echo: EF 65%, Normal right ventricular size and systolic function, Mild MR and TR.  Ambulatory O2 eval    Hypothyroidism- (present on admission)  Assessment & Plan  TSH: 15, free T4 0.96  Denies recent dose change, Will inc levothyroxine to 100mcg from 88mcg  Outpatient PCP follow up is advised with patient and daughter    Knee pain, left  Assessment & Plan  Recent fall  X ray of L knee  PT/OT    Right ankle pain- (present on admission)  Assessment & Plan  Reports recent fall  Also complaining of left hip and low back pain  X-ray negative for fracture  ESR/CRP elevated  Check JESUS, uric acid, anti-CCP  Ace wrap  PT/OT    Mild cognitive impairment- (present on admission)  Assessment & Plan  Continue donezepil  SLP cognitive evaluation    Anxiety- (present on admission)  Assessment &  Plan  Continue Lexapro    Cardiac pacemaker in situ- (present on admission)  Assessment & Plan  Hx of   Recently seen by cardiology and had pacemaker interrogated, family reports working normally    Hypokalemia- (present on admission)  Assessment & Plan  Replace as needed    Hyperlipidemia with target LDL less than 100- (present on admission)  Assessment & Plan  Continue atorvastatin       VTE prophylaxis: lovenox

## 2021-05-28 NOTE — ASSESSMENT & PLAN NOTE
Reports recent fall  Also complaining of left hip and low back pain  X-ray negative for fracture  ESR/CRP elevated   JESUS, uric acid, anti-CCP negative  Ace wrap, Voltaren gel  scheduled Tylenol and as needed oxycodone for severe pain  PT/OT recommending placement, pending acceptance

## 2021-05-28 NOTE — ASSESSMENT & PLAN NOTE
TSH: 15, free T4 0.96  Denies recent dose change, levothyroxine increased to 100mcg from 88mcg  Outpatient PCP follow up is advised with patient and daughter

## 2021-05-28 NOTE — DISCHARGE PLANNING
"Anticipated Discharge Disposition:   TBD, pending IDT collaboration     Action:   Chart review complete.     Per MD, patient was admitted for weakness and pain. PT/OT/SLP pending at this time for discharge recommendations. MD also to have ambulatory O2 test performed.     Per PT note, \"will need SNF unless she makes a dramatic improvement.\"     Per SLP note, \"Recommend home health for continued speech therapy services.\"     OT pending. Ambulatory O2 test pending. RN CM will continue to follow and assist as needed.     Barriers to Discharge:   OT evaluation  Possible placement   Ambulatory O2 test  Medical Clearance    Plan:   Hospital care management will continue to assist with discharge planning needs.     "

## 2021-05-28 NOTE — ASSESSMENT & PLAN NOTE
Acute on chronic, symptoms have been going on for months.   Noted Sat drops 89% intermittently, improves with deep breathing    CTA negative for pulmonary embolism, shows some emphysematous changes  Outpatient PFTs within normal limits  ProBNP mildly elevated, Trace edema on BLE  Echo: EF 65%, Normal right ventricular size and systolic function, Mild MR and TR.    Ambulatory O2 eval  Add lasix 20mg daily for gentle diuresis, continue to assess daily

## 2021-05-28 NOTE — PROGRESS NOTES
0830 Assumed care of pt. A&O x4, reports 8/10 pain to L knee and R ankle. Medicated per MAR. R ankle is swollen, daysi hose and ace wrap applied. Dr. Do at bedside, and daughter at bedside. No other needs at this time.     1000 Pt still c/o 5/10 pain but to lower back and R ankle. Medicated per MAR.

## 2021-05-28 NOTE — PROGRESS NOTES
Report received from Anastacio JJ. Pt came to the floor on a stretcher. Pt was transferred to the bed with sliding board with 3 people assist.Pt is AAO x 4.  Pt reports pain medicated with per MAR.POC discussed to control pain. Pt Right foot was wrap in ACE wrap ice applied to it. ISAI hose ordered. Pure wick in place, Fall precaution in place with bed locked,in lowest position with call light within reached.All needs met at this time.Houlry rounding in place.

## 2021-05-28 NOTE — ASSESSMENT & PLAN NOTE
Continue donezepil  SLP cognitive evaluation: mod cognitive deficit   Eye Protection Verbiage: Before proceeding with the stage, a plastic scleral shield was inserted. The globe was anesthetized with a few drops of 1% lidocaine with 1:100,000 epinephrine. Then, an appropriate sized scleral shield was chosen and coated with lacrilube ointment. The shield was gently inserted and left in place for the duration of each stage. After the stage was completed, the shield was gently removed.

## 2021-05-28 NOTE — H&P
Hospital Medicine History & Physical Note    Date of Service  5/27/2021    Primary Care Physician  Azeem Carter P.A.-C.    Consultants  N/A    Code Status  DNAR/DNI    Chief Complaint  No chief complaint on file.      History of Presenting Illness  81 y.o. female with past medical history of hyperlipidemia, hypothyroidism, cerebral aneurysm, pacemaker, who presented 5/27/2021 with increased shortness of breath, weakness for the last week.  Patient also complains of right ankle pain and left hip pain, denies any trauma or falls.  Per patient's daughter over the last few months she has been calm less active and having difficulty breathing even when walking a few steps.  Patient now has to stop to take of breath after she gets out of the car.  Per the patient she is still able to complete her ADLs as her house is fully handicap accessible.  Patient's daughter does do patient's billing for the last few years.  Family has noticed that patient's memory has also declined.  Patient has no complaints, reports she is just getting old.  Patient does have a tremor that is new however she states that it does not cause her problems.  Per family she has difficulty holding onto cups and difficulty with writing.  She is supposed to have a outpatient neurology appointment tomorrow for further evaluation of her memory.  Of note patient had recent PFTs with normal FEV1/FVC ratio and has been seen by outpatient cardiology and pulmonology.    In the ER vitals stable.  Lab work-up significant for platelets 159, sed rate 62, CRP 6.5, potassium 3.5, creatinine 1.06, lactic acid 2.1, BNP 1101, and D-dimer 2.01.  X-rays of the hip and ankle are negative for acute fracture.  CTA showed no pulmonary embolus, mild emphysematous changes.    Review of Systems  Review of Systems   Constitutional: Positive for malaise/fatigue. Negative for chills and fever.   Respiratory: Positive for shortness of breath.    Cardiovascular: Negative for chest  pain.   Gastrointestinal: Negative for abdominal pain, nausea and vomiting.   Genitourinary: Negative for dysuria.   Musculoskeletal: Negative for myalgias.        Right ankle pain  Left hip pain   Skin: Negative for rash.   Neurological: Positive for tremors and weakness. Negative for dizziness and headaches.   Psychiatric/Behavioral: Positive for memory loss. Negative for depression. The patient is nervous/anxious.    All other systems reviewed and are negative.      Past Medical History   has a past medical history of Cancer (HCC) (2013), CATARACT, Cerebral aneurysm, Cholesterol blood decreased, Dental disorder, High cholesterol, Hyperlipidemia, Hypothyroidism (2/13/2013), OSTEOPOROSIS (2/13/2013), Pacemaker, Pain (12/05/2019), S/P cardiac catheterization, Short-term memory loss, and Thyroid disease.    Surgical History   has a past surgical history that includes recovery (10/16/2013); recovery (7/24/2014); recovery (3/21/2016); tonsillectomy; gyn surgery (1982); shoulder surgery (Right, 1998); other (Bilateral); shoulder surgery (Left, 2016); and other neurological surg.     Family History  family history includes Heart Disease in her father and mother.     Social History   reports that she has never smoked. She has never used smokeless tobacco. She reports that she does not drink alcohol and does not use drugs.    Allergies  Allergies   Allergen Reactions   • Sulfa Drugs Rash and Swelling     Anasarca-especially face   • Latex Rash and Itching   • Tape Rash     Latex tape  (paper tape OK)       Medications  Prior to Admission Medications   Prescriptions Last Dose Informant Patient Reported? Taking?   Calcium Carbonate-Vitamin D (CALCIUM 600/VITAMIN D PO)  Patient Yes No   Sig: Take  by mouth.   aspirin (ASA) 81 MG CHEW  Patient Yes No   Sig: Take 81 mg by mouth every morning.   atorvastatin (LIPITOR) 40 MG Tab   No No   Sig: Take 1 Tab by mouth every day.   cholestyramine (QUESTRAN) 4 g packet   Yes No    donepezil (ARICEPT) 10 MG tablet   No No   Sig: TAKE 1 TABLET BY MOUTH  EVERY EVENING   escitalopram (LEXAPRO) 20 MG tablet   No No   Sig: TAKE 1 TABLET BY MOUTH  DAILY   levothyroxine (SYNTHROID) 88 MCG Tab   No No   Sig: TAKE 1 TABLET BY MOUTH IN  THE MORNING ON AN EMPTY  STOMACH      Facility-Administered Medications: None       Physical Exam  Temp:  [36.6 °C (97.9 °F)] 36.6 °C (97.9 °F)  Pulse:  [74] 74  Resp:  [18] 18  BP: (125)/(74) 125/74  SpO2:  [94 %] 94 %    Physical Exam  Vitals and nursing note reviewed.   Constitutional:       General: She is not in acute distress.  HENT:      Head: Normocephalic and atraumatic.      Right Ear: External ear normal.      Left Ear: External ear normal.      Nose: Nose normal.      Mouth/Throat:      Pharynx: Oropharynx is clear.   Eyes:      Extraocular Movements: Extraocular movements intact.      Conjunctiva/sclera: Conjunctivae normal.      Pupils: Pupils are equal, round, and reactive to light.   Cardiovascular:      Rate and Rhythm: Normal rate and regular rhythm.      Pulses: Normal pulses.      Heart sounds: Normal heart sounds.   Pulmonary:      Effort: Pulmonary effort is normal. No respiratory distress.      Breath sounds: Normal breath sounds.   Abdominal:      General: Abdomen is flat. There is no distension.      Palpations: Abdomen is soft.      Tenderness: There is no abdominal tenderness. There is no guarding.   Musculoskeletal:         General: Normal range of motion.      Cervical back: Normal range of motion and neck supple.      Right lower leg: No edema.      Left lower leg: No edema.   Skin:     General: Skin is warm and dry.      Findings: Erythema (right ankle) present.      Comments: Right ankle erythematous and swollen    Neurological:      General: No focal deficit present.      Mental Status: She is alert and oriented to person, place, and time.      Cranial Nerves: No cranial nerve deficit.      Motor: Weakness present.   Psychiatric:          Mood and Affect: Mood normal.         Behavior: Behavior normal.         Laboratory:  Recent Labs     05/27/21  1712   WBC 8.6   RBC 4.12*   HEMOGLOBIN 13.0   HEMATOCRIT 38.7   MCV 93.9   MCH 31.6   MCHC 33.6   RDW 45.5   PLATELETCT 159*   MPV 11.7     Recent Labs     05/27/21  1712   SODIUM 140   POTASSIUM 3.5*   CHLORIDE 98   CO2 27   GLUCOSE 116*   BUN 11   CREATININE 1.06   CALCIUM 8.7     Recent Labs     05/27/21  1712   ALTSGPT 11   ASTSGOT 13   ALKPHOSPHAT 97   TBILIRUBIN 1.5   GLUCOSE 116*         Recent Labs     05/27/21  1712   NTPROBNP 1101*         Recent Labs     05/27/21  1712   TROPONINT <6       Imaging:  CT-CTA CHEST PULMONARY ARTERY W/ RECONS   Final Result      1.  No evidence of pulmonary embolus.      2.  Mild emphysematous change of the lungs.      3.  Atherosclerotic vascular calcification.      DX-HIP-COMPLETE - UNILATERAL 2+ LEFT   Final Result      No evidence of left hip fracture or degenerative change.      DX-ANKLE 3+ VIEWS RIGHT   Final Result      No evidence of acute fracture or dislocation.      DX-CHEST-PORTABLE (1 VIEW)   Final Result      Cardiomegaly.      EC-ECHOCARDIOGRAM COMPLETE W/ CONT    (Results Pending)         Assessment/Plan:  I anticipate this patient is appropriate for observation status at this time.    Right ankle pain- (present on admission)  Assessment & Plan  Denies trauma or falls  Also complaining of left hip and low back pain  X-ray negative for fracture  ESR/CRP elevated  Check JESUS, uric acid, anti-CCP  Ace wrap  PT/OT    Dyspnea- (present on admission)  Assessment & Plan  Acute on chronic, symptoms have been going on for months  Patient satting well on room air  CTA negative for pulmonary embolism, shows some emphysematous changes  Outpatient PFTs within normal limits  BNP mildly elevated, is not fluid overloaded on exam  Echo pending    Anxiety- (present on admission)  Assessment & Plan  Continue Lexapro    Mild cognitive impairment- (present on  admission)  Assessment & Plan  Continue donezepil  SLP cognitive evaluation    Cardiac pacemaker in situ- (present on admission)  Assessment & Plan  Hx of   Recently seen by cardiology and had pacemaker interrogated, family reports working normally    Hypokalemia- (present on admission)  Assessment & Plan  Potassium 3.5 on admission  Replace as needed    Hypothyroidism- (present on admission)  Assessment & Plan  Continue levothyroxine  Check TSH    Hyperlipidemia with target LDL less than 100- (present on admission)  Assessment & Plan  Continue atorvastatin    DVT prophylaxis: Lovenox

## 2021-05-28 NOTE — THERAPY
Speech Language Pathology   Initial Assessment     Patient Name: Kaila Choudhary  AGE:  81 y.o., SEX:  female  Medical Record #: [id number]  Today's Date: 5/28/2021     Precautions  Precautions: Fall Risk    Assessment    Patient is 81 y.o. female with history of hypothyoidism, hyperlipidemia, cerebral aneurysm, pacemaker, who was admitted 5/27/2021 with increased shortness of breath, weakness and R ankle, L knee and L hip pain. No previous hx of SLP.        Pt was seen today for cognitive-linguistic evaluation. Pt was alert with daughter at bedside and willing to participate in evaluation. The patient denied any acute cognitive deficits, but reported feeling generalized weakness. Pt was administered the Cognistat (The Neurobehavioral Cognitive Status Examination), as well as informal measures. Pt scored within functional limits for Orientation, Attention, Language, Abstract Reasoning and Verbal Reasoning. Pt scored in the MODERATE range for auditory calculations and in the SEVERE range for Memory. During informal assessments, 30% accuracy for medication management, she was unable to determine dosage and times to take medications despite cues. The patient declined to participate in written expression and reading comprehension tasks, but was able to read and comprehend instructions provided to her on medication management worksheet. Per patient and daughter at bedside, the patient does not manage her own medications or financials at home, but otherwise pt is independent. Overall, pt presents with a moderate cognitive deficit.     Recommend intermittent supervision during the day and direct supervision with IADLs (e.g., medication management, financial management, cooking, scheduling, etc.). Additionally recommend follow up with neurology as previously referred by patients PCP to further evaluate memory.       Plan    Recommend Speech Therapy 3 times per week until therapy goals are met for the following  "treatments:  Cognitive-Linguistic Training.    Discharge Recommendations: Recommend home health for continued speech therapy services    Objective       05/28/21 1442   Verbal Expression   Verbal Expression / Aphasia Eval (WDL) WDL   Auditory Comprehension   Auditory Comprehension (WDL) WDL   Cognitive-Linguistic   Level of Consciousness Alert   Orientation Level Not Oriented to Day   Sustained Attention Within Functional Limits (6-7)   Divided Attention Minimal (4)   Short Term Memory Severe (2)   Immediate Memory Minimal (4)   Abstract Reasoning Within Functional Limits (6-7)   Safety Awareness Within Functional Limits (6-7)   Insight into Deficits Within Functional Limits (6-7)   Auditory Math Moderate (3)   Medication Management  Moderate (3)   Patient / Family Goals   Patient / Family Goal #1 \"I want to be able to return home\"   Short Term Goals   Short Term Goal # 1 Pt will recall new information with 75% accuracy after a 5 minute delay given mod verbal cues    Short Term Goal # 2 Pt will complete following written directions task with 75% accuracy          "

## 2021-05-28 NOTE — ASSESSMENT & PLAN NOTE
Hx of   Recently seen by cardiology and had pacemaker interrogated, family reports working normally

## 2021-05-28 NOTE — CARE PLAN
The patient is Stable - Low risk of patient condition declining or worsening    Shift Goals  Clinical Goals: pain control  Patient Goals: Get some rest  Family Goals: To get well    Progress made toward(s) clinical / shift goals:  Pain was manageable.Pt slept well through out the night    Patient is not progressing towards the following goals:      Problem: Knowledge Deficit - Standard  Goal: Patient and family/care givers will demonstrate understanding of plan of care, disease process/condition, diagnostic tests and medications  Outcome: Progressing  POC discussed with pt and family.     Problem: Skin Integrity  Goal: Skin integrity is maintained or improved  Outcome: Progressing  Waffle mattress in place     Problem: Fall Risk  Goal: Patient will remain free from falls  Outcome: Progressing  Fall precaution in place.     Problem: Pain - Standard  Goal: Alleviation of pain or a reduction in pain to the patient’s comfort goal  Outcome: Progressing

## 2021-05-29 LAB
ANION GAP SERPL CALC-SCNC: 11 MMOL/L (ref 7–16)
BASOPHILS # BLD AUTO: 0.4 % (ref 0–1.8)
BASOPHILS # BLD: 0.03 K/UL (ref 0–0.12)
BUN SERPL-MCNC: 12 MG/DL (ref 8–22)
CALCIUM SERPL-MCNC: 8.1 MG/DL (ref 8.4–10.2)
CCP IGG SERPL-ACNC: 2 UNITS (ref 0–19)
CHLORIDE SERPL-SCNC: 100 MMOL/L (ref 96–112)
CO2 SERPL-SCNC: 28 MMOL/L (ref 20–33)
CREAT SERPL-MCNC: 0.78 MG/DL (ref 0.5–1.4)
EOSINOPHIL # BLD AUTO: 0.13 K/UL (ref 0–0.51)
EOSINOPHIL NFR BLD: 1.7 % (ref 0–6.9)
ERYTHROCYTE [DISTWIDTH] IN BLOOD BY AUTOMATED COUNT: 46.7 FL (ref 35.9–50)
GLUCOSE SERPL-MCNC: 92 MG/DL (ref 65–99)
HCT VFR BLD AUTO: 34.2 % (ref 37–47)
HGB BLD-MCNC: 11.3 G/DL (ref 12–16)
IMM GRANULOCYTES # BLD AUTO: 0.04 K/UL (ref 0–0.11)
IMM GRANULOCYTES NFR BLD AUTO: 0.5 % (ref 0–0.9)
LYMPHOCYTES # BLD AUTO: 1.62 K/UL (ref 1–4.8)
LYMPHOCYTES NFR BLD: 21.5 % (ref 22–41)
MCH RBC QN AUTO: 31.6 PG (ref 27–33)
MCHC RBC AUTO-ENTMCNC: 33 G/DL (ref 33.6–35)
MCV RBC AUTO: 95.5 FL (ref 81.4–97.8)
MONOCYTES # BLD AUTO: 0.99 K/UL (ref 0–0.85)
MONOCYTES NFR BLD AUTO: 13.1 % (ref 0–13.4)
NEUTROPHILS # BLD AUTO: 4.72 K/UL (ref 2–7.15)
NEUTROPHILS NFR BLD: 62.8 % (ref 44–72)
NRBC # BLD AUTO: 0 K/UL
NRBC BLD-RTO: 0 /100 WBC
NUCLEAR IGG SER QL IA: NORMAL
PLATELET # BLD AUTO: 148 K/UL (ref 164–446)
PMV BLD AUTO: 12.3 FL (ref 9–12.9)
POTASSIUM SERPL-SCNC: 3.4 MMOL/L (ref 3.6–5.5)
RBC # BLD AUTO: 3.58 M/UL (ref 4.2–5.4)
SODIUM SERPL-SCNC: 139 MMOL/L (ref 135–145)
WBC # BLD AUTO: 7.5 K/UL (ref 4.8–10.8)

## 2021-05-29 PROCEDURE — A9270 NON-COVERED ITEM OR SERVICE: HCPCS | Performed by: STUDENT IN AN ORGANIZED HEALTH CARE EDUCATION/TRAINING PROGRAM

## 2021-05-29 PROCEDURE — 700111 HCHG RX REV CODE 636 W/ 250 OVERRIDE (IP): Performed by: INTERNAL MEDICINE

## 2021-05-29 PROCEDURE — 36415 COLL VENOUS BLD VENIPUNCTURE: CPT

## 2021-05-29 PROCEDURE — 700102 HCHG RX REV CODE 250 W/ 637 OVERRIDE(OP): Performed by: INTERNAL MEDICINE

## 2021-05-29 PROCEDURE — 96372 THER/PROPH/DIAG INJ SC/IM: CPT

## 2021-05-29 PROCEDURE — 99226 PR SUBSEQUENT OBSERVATION CARE,LEVEL III: CPT | Performed by: STUDENT IN AN ORGANIZED HEALTH CARE EDUCATION/TRAINING PROGRAM

## 2021-05-29 PROCEDURE — 97535 SELF CARE MNGMENT TRAINING: CPT

## 2021-05-29 PROCEDURE — 97165 OT EVAL LOW COMPLEX 30 MIN: CPT

## 2021-05-29 PROCEDURE — G0378 HOSPITAL OBSERVATION PER HR: HCPCS

## 2021-05-29 PROCEDURE — A9270 NON-COVERED ITEM OR SERVICE: HCPCS | Performed by: INTERNAL MEDICINE

## 2021-05-29 PROCEDURE — 85025 COMPLETE CBC W/AUTO DIFF WBC: CPT

## 2021-05-29 PROCEDURE — 80048 BASIC METABOLIC PNL TOTAL CA: CPT

## 2021-05-29 PROCEDURE — 700102 HCHG RX REV CODE 250 W/ 637 OVERRIDE(OP): Performed by: STUDENT IN AN ORGANIZED HEALTH CARE EDUCATION/TRAINING PROGRAM

## 2021-05-29 RX ORDER — POTASSIUM CHLORIDE 20 MEQ/1
40 TABLET, EXTENDED RELEASE ORAL ONCE
Status: COMPLETED | OUTPATIENT
Start: 2021-05-29 | End: 2021-05-29

## 2021-05-29 RX ORDER — FUROSEMIDE 20 MG/1
20 TABLET ORAL
Status: DISCONTINUED | OUTPATIENT
Start: 2021-05-29 | End: 2021-06-01

## 2021-05-29 RX ADMIN — LEVOTHYROXINE SODIUM 100 MCG: 0.1 TABLET ORAL at 07:27

## 2021-05-29 RX ADMIN — ESCITALOPRAM OXALATE 20 MG: 10 TABLET ORAL at 07:27

## 2021-05-29 RX ADMIN — ACETAMINOPHEN 650 MG: 325 TABLET ORAL at 15:34

## 2021-05-29 RX ADMIN — ASPIRIN 81 MG CHEWABLE TABLET 81 MG: 81 TABLET CHEWABLE at 07:27

## 2021-05-29 RX ADMIN — ENOXAPARIN SODIUM 40 MG: 40 INJECTION SUBCUTANEOUS at 07:27

## 2021-05-29 RX ADMIN — OXYCODONE HYDROCHLORIDE 2.5 MG: 5 TABLET ORAL at 15:33

## 2021-05-29 RX ADMIN — POTASSIUM CHLORIDE 40 MEQ: 1500 TABLET, EXTENDED RELEASE ORAL at 09:31

## 2021-05-29 RX ADMIN — DONEPEZIL HYDROCHLORIDE 10 MG: 5 TABLET, FILM COATED ORAL at 21:05

## 2021-05-29 RX ADMIN — ATORVASTATIN CALCIUM 40 MG: 40 TABLET, FILM COATED ORAL at 07:27

## 2021-05-29 RX ADMIN — FUROSEMIDE 20 MG: 20 TABLET ORAL at 12:52

## 2021-05-29 ASSESSMENT — COGNITIVE AND FUNCTIONAL STATUS - GENERAL
DAILY ACTIVITIY SCORE: 19
HELP NEEDED FOR BATHING: A LITTLE
DRESSING REGULAR LOWER BODY CLOTHING: A LITTLE
SUGGESTED CMS G CODE MODIFIER DAILY ACTIVITY: CK
DRESSING REGULAR UPPER BODY CLOTHING: A LITTLE
TOILETING: A LOT

## 2021-05-29 ASSESSMENT — ENCOUNTER SYMPTOMS
FALLS: 1
SHORTNESS OF BREATH: 1
BACK PAIN: 1
COUGH: 0
WHEEZING: 0
HEMOPTYSIS: 0
DIZZINESS: 1

## 2021-05-29 ASSESSMENT — ACTIVITIES OF DAILY LIVING (ADL): TOILETING: INDEPENDENT

## 2021-05-29 NOTE — PROGRESS NOTES
Hospital Medicine Daily Progress Note    Date of Service  5/29/2021    Chief Complaint  81 y.o. female with history of hypothyoidism, hyperlipidemia, cerebral aneurysm, pacemaker, who was admitted 5/27/2021 with increased shortness of breath, weakness and R ankle, L knee and L hip pain.     Hospital Course  Per daughter, Patient lives herself, and she is still able to complete her ADLs as her house is fully handicap accessible.  Patient had recent PFTs with normal FEV1/FVC ratio and has been seen by outpatient cardiology and pulmonology.    CTA: no pulmonary embolus, mild emphysematous changes  X-rays of L hip and R ankle are negative for acute fracture.  L knee xray: moderate to large joint effusion. Orthopedics consulted.  TSH: 15, free T4 0.96  Echo: EF 65%, Normal right ventricular size and systolic function, Mild MR and TR.  ProBNP 1101  Falls    PT: SNF, referred  Cognitive evaluation: moderate cognitive deficit  Ambulatory O2 test  Inc levothyroxine to 100mcg daily  Add lasix 20mg daily  Orthopedics consulted    Interval Problem Update  Reports L knee pain. Patient reports she had a fall recently. She has walker and cane at home to assist walking. Lives alone, but daughter lives closeby.  Noted worsening sob after exertions. L knee pain and swelling  Denies fever, chills, chest pain, nausea, vomiting, abdominal pain     All Data, Medication data reviewed.  Case discussed with nursing, CM,  nurse, pharmacy in IDT rounds.     Consultants/Specialty  orthopedics    Code Status  DNAR/DNI    Disposition  TBD    Review of Systems  Review of Systems   Respiratory: Positive for shortness of breath. Negative for cough, hemoptysis and wheezing.    Musculoskeletal: Positive for back pain, falls and joint pain.   Neurological: Positive for dizziness.   All other systems reviewed and are negative.       Physical Exam  Temp:  [36.4 °C (97.6 °F)-36.7 °C (98.1 °F)] 36.7 °C (98.1 °F)  Pulse:  [60-71] 60  Resp:  [16-18]  16  BP: (146-152)/(54-69) 146/54  SpO2:  [93 %-96 %] 96 %    Physical Exam  Vitals and nursing note reviewed.   Constitutional:       General: She is not in acute distress.     Appearance: Normal appearance. She is ill-appearing.   HENT:      Head: Normocephalic and atraumatic.      Nose: Nose normal.      Mouth/Throat:      Mouth: Mucous membranes are dry.      Pharynx: Oropharynx is clear.   Eyes:      Extraocular Movements: Extraocular movements intact.      Conjunctiva/sclera: Conjunctivae normal.      Pupils: Pupils are equal, round, and reactive to light.   Cardiovascular:      Rate and Rhythm: Normal rate and regular rhythm.      Pulses: Normal pulses.      Heart sounds: Normal heart sounds.   Pulmonary:      Effort: Pulmonary effort is normal. No respiratory distress.      Breath sounds: Normal breath sounds. No wheezing or rales.   Abdominal:      General: Abdomen is flat. Bowel sounds are normal. There is no distension.      Palpations: Abdomen is soft.      Tenderness: There is no abdominal tenderness.   Musculoskeletal:         General: Swelling (L knee swelling) present. No tenderness. Normal range of motion.      Cervical back: Normal range of motion and neck supple.      Right lower leg: Edema (trace) present.      Left lower leg: Edema (trace) present.   Skin:     General: Skin is warm and dry.      Capillary Refill: Capillary refill takes 2 to 3 seconds.   Neurological:      General: No focal deficit present.      Mental Status: She is alert and oriented to person, place, and time. Mental status is at baseline.   Psychiatric:         Mood and Affect: Mood normal.         Behavior: Behavior normal.         Fluids  No intake or output data in the 24 hours ending 05/29/21 1107    Laboratory  Recent Labs     05/27/21  1712 05/28/21  0029 05/29/21  0415   WBC 8.6 7.6 7.5   RBC 4.12* 3.55* 3.58*   HEMOGLOBIN 13.0 11.3* 11.3*   HEMATOCRIT 38.7 33.3* 34.2*   MCV 93.9 93.8 95.5   MCH 31.6 31.8 31.6   MCHC  33.6 33.9 33.0*   RDW 45.5 45.5 46.7   PLATELETCT 159* 134* 148*   MPV 11.7 11.9 12.3     Recent Labs     05/27/21  1712 05/28/21  0029 05/29/21  0415   SODIUM 140 139 139   POTASSIUM 3.5* 3.3* 3.4*   CHLORIDE 98 100 100   CO2 27 28 28   GLUCOSE 116* 102* 92   BUN 11 11 12   CREATININE 1.06 0.92 0.78   CALCIUM 8.7 8.0* 8.1*                   Imaging  DX-KNEE 3 VIEWS LEFT   Final Result      1.  Tricompartment degenerative change which involves the medial femorotibial articulation to the greatest degree.      2.  Moderate to large joint effusion.      EC-ECHOCARDIOGRAM COMPLETE W/O CONT   Final Result      CT-CTA CHEST PULMONARY ARTERY W/ RECONS   Final Result      1.  No evidence of pulmonary embolus.      2.  Mild emphysematous change of the lungs.      3.  Atherosclerotic vascular calcification.      DX-HIP-COMPLETE - UNILATERAL 2+ LEFT   Final Result      No evidence of left hip fracture or degenerative change.      DX-ANKLE 3+ VIEWS RIGHT   Final Result      No evidence of acute fracture or dislocation.      DX-CHEST-PORTABLE (1 VIEW)   Final Result      Cardiomegaly.           Assessment/Plan  * Dyspnea- (present on admission)  Assessment & Plan  Acute on chronic, symptoms have been going on for months.   Noted Sat drops 89% intermittently, improves with deep breathing    CTA negative for pulmonary embolism, shows some emphysematous changes  Outpatient PFTs within normal limits  ProBNP mildly elevated, Trace edema on BLE  Echo: EF 65%, Normal right ventricular size and systolic function, Mild MR and TR.    Ambulatory O2 eval  Add lasix 20mg daily for gentle diuresis    Hypothyroidism- (present on admission)  Assessment & Plan  TSH: 15, free T4 0.96  Denies recent dose change, Will inc levothyroxine to 100mcg from 88mcg  Outpatient PCP follow up is advised with patient and daughter    Knee pain, left  Assessment & Plan  Recent fall  X ray of L knee: mod to large joint effusion  Orthopedics is  consulted  PT/OT    Right ankle pain- (present on admission)  Assessment & Plan  Reports recent fall  Also complaining of left hip and low back pain  X-ray negative for fracture  ESR/CRP elevated  Check JESUS, uric acid normal, anti-CCP  Ace wrap  PT/OT    Mild cognitive impairment- (present on admission)  Assessment & Plan  Continue donezepil  SLP cognitive evaluation: mod cognitive deficit    Fall  Assessment & Plan  Recent fall reported  PT/OT  Fall precautions    Anxiety- (present on admission)  Assessment & Plan  Continue Lexapro    Cardiac pacemaker in situ- (present on admission)  Assessment & Plan  Hx of   Recently seen by cardiology and had pacemaker interrogated, family reports working normally    Hypokalemia- (present on admission)  Assessment & Plan  Replace as needed    Hyperlipidemia with target LDL less than 100- (present on admission)  Assessment & Plan  Continue atorvastatin       VTE prophylaxis: lovenox

## 2021-05-29 NOTE — CARE PLAN
Problem: Knowledge Deficit - Standard  Goal: Patient and family/care givers will demonstrate understanding of plan of care, disease process/condition, diagnostic tests and medications  Outcome: Not Progressing     Problem: Pain - Standard  Goal: Alleviation of pain or a reduction in pain to the patient’s comfort goal  Outcome: Not Progressing   The patient is Watcher - Medium risk of patient condition declining or worsening    Shift Goals  Clinical Goals: safety  Patient Goals: Get some rest  Family Goals: To get well    Progress made toward(s) clinical / shift goals:  Pt slept most of the night    Patient is not progressing towards the following goals:Pt confused; unable to communicate pain      Problem: Knowledge Deficit - Standard  Goal: Patient and family/care givers will demonstrate understanding of plan of care, disease process/condition, diagnostic tests and medications  Outcome: Not Progressing     Problem: Pain - Standard  Goal: Alleviation of pain or a reduction in pain to the patient’s comfort goal  Outcome: Not Progressing

## 2021-05-29 NOTE — FACE TO FACE
Face to Face Supporting Documentation - Home Health    The encounter with this patient was in whole or in part the primary reason for home health admission.    Date of encounter:   Patient:                    MRN:                       YOB: 2021  Kaila Choudhary  [id number]  1940     Home health to see patient for:  Skilled Nursing care for assessment, interventions & education    Skilled need for:  New Onset Medical Diagnosis fall, knee pain and ankle pain    Skilled nursing interventions to include:  Comment: debility    Homebound status evidenced by:  Need the aid of supportive devices such as crutches, canes, wheelchairs or walkers. Leaving home requires a considerable and taxing effort. There is a normal inability to leave the home.    Community Physician to provide follow up care: Azeem Carter P.A.-C.     Optional Interventions? No      I certify the face to face encounter for this home health care referral meets the CMS requirements and the encounter/clinical assessment with the patient was, in whole, or in part, for the medical condition(s) listed above, which is the primary reason for home health care. Based on my clinical findings: the service(s) are medically necessary, support the need for home health care, and the homebound criteria are met.  I certify that this patient has had a face to face encounter by myself.  Keyla Do M.D. - NPI: [id number]

## 2021-05-29 NOTE — DISCHARGE PLANNING
Received Choice form at 0095  Agency/Facility Name: 1)[facility], 2)[facility], 3)[facility]   Referral sent per Choice form @ [phone]

## 2021-05-29 NOTE — DISCHARGE PLANNING
Care Transition Team Discharge Planning     Anticipated Discharge Disposition: TBD     Action: Per second rounds, Md indicates pt needs infusion and not medically cleared. Pt should go to SNF, but pt does not want this d/c.      Epic therapy hx:  -SLP 5.28.21-HH  -PT 5.28.21-SNF unless dramatically improves  -OT 5.29.21- w/ dtr who can provide 24/7 support       Lsw sent text to MD requesting HH order/f2f and reporting as above.    Lsw made hand off report to d/c planner covering tomorrow.     Barriers to Discharge: not medically clear/stable     Plan: Lsw will continue to follow, and assist w/ d/c planning.

## 2021-05-29 NOTE — DISCHARGE PLANNING
Care Transition Team Discharge Planning    Anticipated Discharge Disposition: dc home w/ HH    Action: Lsw met w/ pt and dtr at bedside. Pt was against all suggestions from Lsw until dtr began to explain she had the service before and it will be beneficial to have therapy at home.    Lsw acquired completed choice form and faxed to [facility]. Lsw sent text to [facility] indicating HH choice had been faxed.      Barriers to Discharge: referrals, acceptance/INS AUTH,      Plan: Lsw will continue to follow, and assist w/ d/c planning.     Detail Level: Generalized Detail Level: Detailed

## 2021-05-29 NOTE — THERAPY
Occupational Therapy   Initial Evaluation     Patient Name: Kaila Choudhary  Age:  81 y.o., Sex:  female  Medical Record #: [id number]  Today's Date: 5/29/2021     Precautions: (P) Fall Risk    Assessment  Patient is 81 y.o. female admitted with SOB, R ankle, L hip pain. GLF a few days ago. A&Ox3, mild confusion noted. STM deficits. Much improved functional mobility noted from yesterday. Performs sup to sit with Sup, STS with Joan, FWW. Incontinent of bladder; Sup with pericare in standing. Seated grooming with Sup. Joan with gown change. [place] with LB drsg. Transfers with Joan,FWW to chair for lunch. Pt lives alone, had been Indep with all ADL's, dtrs assist with some IADL's. Dtr present, states she can provide 24/7 Sup when pt returns home. OT will follow while in house.        Plan  Recommend Occupational Therapy 5 times per week until therapy goals are met for the following treatments:  Neuro Re-Education / Balance, Self Care/Activities of Daily Living and Therapeutic Activities.    DC Equipment Recommendations: (P) Unable to determine at this time  Discharge Recommendations: (P) Recommend home health for continued occupational therapy services (and family assist - dtr can provide 24/7 Sup)      05/29/21 1301   Short Term Goals   Short Term Goal # 1 pt will perform ADL transfers with Sup within 5 days   Short Term Goal # 2 pt will perform grooming at sink with Sup within 5 days   Short Term Goal # 3 pt will perform FB dressing with Sup within5 days

## 2021-05-29 NOTE — PROGRESS NOTES
Report received. Assessment complete.  AAOx2. Patient does not call appropriately.    Pt denies pain, but calls out and withdraws with movement. PRN pain med given (2.5 oxy) with good results. No further interventions needed at this time. Will continue to monitor.     Pt is confused. Hearing aids at bedside in container. Bed alarm set.    Pt denies N/V, SOB, or chest pain.    ALEXIS, pivots to BSC x 2PA.    + void, Pt is periodically incontinent of urine; LBM 5/25.    Pt is tolerating cardiac diet.       Plan of care discussed with patient. Fall precautions in place. Belongings and call light within reach. Educated patient to call for assistance as needed. All needs met at this time.

## 2021-05-30 LAB
ANION GAP SERPL CALC-SCNC: 11 MMOL/L (ref 7–16)
BACTERIA UR CULT: NORMAL
BASOPHILS # BLD AUTO: 0.4 % (ref 0–1.8)
BASOPHILS # BLD: 0.03 K/UL (ref 0–0.12)
BUN SERPL-MCNC: 11 MG/DL (ref 8–22)
CALCIUM SERPL-MCNC: 8.5 MG/DL (ref 8.4–10.2)
CHLORIDE SERPL-SCNC: 98 MMOL/L (ref 96–112)
CO2 SERPL-SCNC: 29 MMOL/L (ref 20–33)
CREAT SERPL-MCNC: 0.8 MG/DL (ref 0.5–1.4)
EOSINOPHIL # BLD AUTO: 0.24 K/UL (ref 0–0.51)
EOSINOPHIL NFR BLD: 3 % (ref 0–6.9)
ERYTHROCYTE [DISTWIDTH] IN BLOOD BY AUTOMATED COUNT: 45.2 FL (ref 35.9–50)
GLUCOSE SERPL-MCNC: 91 MG/DL (ref 65–99)
HCT VFR BLD AUTO: 36.6 % (ref 37–47)
HGB BLD-MCNC: 12.1 G/DL (ref 12–16)
IMM GRANULOCYTES # BLD AUTO: 0.03 K/UL (ref 0–0.11)
IMM GRANULOCYTES NFR BLD AUTO: 0.4 % (ref 0–0.9)
LYMPHOCYTES # BLD AUTO: 1.41 K/UL (ref 1–4.8)
LYMPHOCYTES NFR BLD: 17.6 % (ref 22–41)
MCH RBC QN AUTO: 31.2 PG (ref 27–33)
MCHC RBC AUTO-ENTMCNC: 33.1 G/DL (ref 33.6–35)
MCV RBC AUTO: 94.3 FL (ref 81.4–97.8)
MONOCYTES # BLD AUTO: 0.99 K/UL (ref 0–0.85)
MONOCYTES NFR BLD AUTO: 12.3 % (ref 0–13.4)
NEUTROPHILS # BLD AUTO: 5.33 K/UL (ref 2–7.15)
NEUTROPHILS NFR BLD: 66.3 % (ref 44–72)
NRBC # BLD AUTO: 0 K/UL
NRBC BLD-RTO: 0 /100 WBC
PLATELET # BLD AUTO: 158 K/UL (ref 164–446)
PMV BLD AUTO: 11.7 FL (ref 9–12.9)
POTASSIUM SERPL-SCNC: 3.6 MMOL/L (ref 3.6–5.5)
RBC # BLD AUTO: 3.88 M/UL (ref 4.2–5.4)
SIGNIFICANT IND 70042: NORMAL
SITE SITE: NORMAL
SODIUM SERPL-SCNC: 138 MMOL/L (ref 135–145)
SOURCE SOURCE: NORMAL
WBC # BLD AUTO: 8 K/UL (ref 4.8–10.8)

## 2021-05-30 PROCEDURE — 96372 THER/PROPH/DIAG INJ SC/IM: CPT

## 2021-05-30 PROCEDURE — A9270 NON-COVERED ITEM OR SERVICE: HCPCS | Performed by: STUDENT IN AN ORGANIZED HEALTH CARE EDUCATION/TRAINING PROGRAM

## 2021-05-30 PROCEDURE — 85025 COMPLETE CBC W/AUTO DIFF WBC: CPT

## 2021-05-30 PROCEDURE — 700111 HCHG RX REV CODE 636 W/ 250 OVERRIDE (IP): Performed by: INTERNAL MEDICINE

## 2021-05-30 PROCEDURE — A9270 NON-COVERED ITEM OR SERVICE: HCPCS | Performed by: INTERNAL MEDICINE

## 2021-05-30 PROCEDURE — 99233 SBSQ HOSP IP/OBS HIGH 50: CPT | Performed by: STUDENT IN AN ORGANIZED HEALTH CARE EDUCATION/TRAINING PROGRAM

## 2021-05-30 PROCEDURE — 36415 COLL VENOUS BLD VENIPUNCTURE: CPT

## 2021-05-30 PROCEDURE — 770006 HCHG ROOM/CARE - MED/SURG/GYN SEMI*

## 2021-05-30 PROCEDURE — 80048 BASIC METABOLIC PNL TOTAL CA: CPT

## 2021-05-30 PROCEDURE — 700102 HCHG RX REV CODE 250 W/ 637 OVERRIDE(OP): Performed by: INTERNAL MEDICINE

## 2021-05-30 PROCEDURE — 700102 HCHG RX REV CODE 250 W/ 637 OVERRIDE(OP): Performed by: STUDENT IN AN ORGANIZED HEALTH CARE EDUCATION/TRAINING PROGRAM

## 2021-05-30 RX ADMIN — ASPIRIN 81 MG CHEWABLE TABLET 81 MG: 81 TABLET CHEWABLE at 06:23

## 2021-05-30 RX ADMIN — ESCITALOPRAM OXALATE 20 MG: 10 TABLET ORAL at 06:23

## 2021-05-30 RX ADMIN — ENOXAPARIN SODIUM 40 MG: 40 INJECTION SUBCUTANEOUS at 06:25

## 2021-05-30 RX ADMIN — FUROSEMIDE 20 MG: 20 TABLET ORAL at 06:23

## 2021-05-30 RX ADMIN — OXYCODONE HYDROCHLORIDE 2.5 MG: 5 TABLET ORAL at 17:55

## 2021-05-30 RX ADMIN — DONEPEZIL HYDROCHLORIDE 10 MG: 5 TABLET, FILM COATED ORAL at 20:12

## 2021-05-30 RX ADMIN — LEVOTHYROXINE SODIUM 100 MCG: 0.1 TABLET ORAL at 06:23

## 2021-05-30 RX ADMIN — ATORVASTATIN CALCIUM 40 MG: 40 TABLET, FILM COATED ORAL at 06:23

## 2021-05-30 RX ADMIN — OXYCODONE HYDROCHLORIDE 2.5 MG: 5 TABLET ORAL at 10:29

## 2021-05-30 RX ADMIN — DOCUSATE SODIUM 50 MG AND SENNOSIDES 8.6 MG 2 TABLET: 8.6; 5 TABLET, FILM COATED ORAL at 17:55

## 2021-05-30 ASSESSMENT — ENCOUNTER SYMPTOMS
SHORTNESS OF BREATH: 1
HEMOPTYSIS: 0
WHEEZING: 0
BACK PAIN: 1
DIZZINESS: 1
FALLS: 1
COUGH: 0

## 2021-05-30 ASSESSMENT — PAIN DESCRIPTION - PAIN TYPE
TYPE: CHRONIC PAIN
TYPE: ACUTE PAIN

## 2021-05-30 ASSESSMENT — PAIN SCALES - WONG BAKER: WONGBAKER_NUMERICALRESPONSE: HURTS JUST A LITTLE BIT

## 2021-05-30 NOTE — PROGRESS NOTES
Assumed care of the patient at 1900 from day RN. She was resting in bed with no signs of distress. She is A/Ox4 but forgetful. VSS. She denies any needs or pain at this time. Bed in the lowest locked position, bed alarm active, call light in reach.

## 2021-05-30 NOTE — DISCHARGE PLANNING
Anticipated Discharge Disposition: TBD    Action: LSW was notified that patient and family now agreeable to SNF.    LSW met with patient at bedside. Patient confirmed she wants SNF but would like daughter to make choice for her.    LSW called [facility] patient's daughter. [facility] reports that she would like to research facilities. LSW emailed [facility] choice form to [email or web address]. RAFAW explained that at this time, PT is the only therapy recommending SNF. OT re-eval requested. [facility] verbalized understanding that patient may not qualify if she doesn't have enough therapy needs.    Covering holiday worker to follow up with [facility] tomorrow for choices on SNF.    Barriers to Discharge: OT eval needed, choice needed    Plan: LSW to f/u with family on choice    1530: MIKI received email back from [facility]. [facility]ora made choice for [facility]. [facility] and 2. [facility]. [facility] verbalized understanding that OT recommendations are still pending.

## 2021-05-30 NOTE — PROGRESS NOTES
[facility] Medicine Daily Progress Note    Date of Service  5/30/2021    Chief Complaint  81 y.o. female with history of hypothyoidism, hyperlipidemia, cerebral aneurysm, pacemaker, who was admitted 5/27/2021 with increased shortness of breath, weakness and R ankle, L knee and L hip pain.     Hospital Course  Per daughter, Patient lives herself, and she is still able to complete her ADLs as her house is fully handicap accessible.  Patient had recent PFTs with normal FEV1/FVC ratio and has been seen by outpatient cardiology and pulmonology.    CTA: no pulmonary embolus, mild emphysematous changes  X-rays of L hip and R ankle are negative for acute fracture.  L knee xray: moderate to large joint effusion.   TSH: 15, free T4 0.96  Echo: EF 65%, Normal right ventricular size and systolic function, Mild MR and TR.  ProBNP 1101  Falls    PT: SNF, referred  Cognitive evaluation: moderate cognitive deficit  Ambulatory O2 test  Inc levothyroxine to 100mcg daily  Add lasix 20mg daily  I discussed with Orthopedics Dr. Meza. Drainage is not indicated as this will inc chance of bleeding and infection.    Interval Problem Update  Patient reports she had a fall recently. She has walker and cane at home to assist walking. Lives alone, but daughter lives closeby.   Noted worsening sob after exertions. L knee pain and swelling.  Encouraged ambulation.  Denies fever, chills, chest pain, nausea, vomiting, abdominal pain     All Data, Medication data reviewed.  Case discussed with nursing, CM,  nurse, pharmacy in IDT rounds.     Consultants/Specialty  orthopedics    Code Status  DNAR/DNI    Disposition  TBD    Review of Systems  Review of Systems   Respiratory: Positive for shortness of breath. Negative for cough, hemoptysis and wheezing.    Musculoskeletal: Positive for back pain, falls and joint pain.        Knee swelling   Neurological: Positive for dizziness.   All other systems reviewed and are negative.       Physical  Exam  Temp:  [36.7 °C (98.1 °F)-36.9 °C (98.4 °F)] 36.8 °C (98.3 °F)  Pulse:  [60-65] 63  Resp:  [16-20] 20  BP: (127-157)/(54-82) 149/62  SpO2:  [92 %-97 %] 97 %    Physical Exam  Vitals and nursing note reviewed.   Constitutional:       General: She is not in acute distress.     Appearance: Normal appearance. She is ill-appearing.   HENT:      Head: Normocephalic and atraumatic.      Nose: Nose normal.      Mouth/Throat:      Mouth: Mucous membranes are dry.      Pharynx: Oropharynx is clear.   Eyes:      Extraocular Movements: Extraocular movements intact.      Conjunctiva/sclera: Conjunctivae normal.      Pupils: Pupils are equal, round, and reactive to light.   Cardiovascular:      Rate and Rhythm: Normal rate and regular rhythm.      Pulses: Normal pulses.      Heart sounds: Normal heart sounds.   Pulmonary:      Effort: Pulmonary effort is normal. No respiratory distress.      Breath sounds: Normal breath sounds. No wheezing or rales.   Abdominal:      General: Abdomen is flat. Bowel sounds are normal. There is no distension.      Palpations: Abdomen is soft.      Tenderness: There is no abdominal tenderness.   Musculoskeletal:         General: Swelling (L knee swelling) present. No tenderness. Normal range of motion.      Cervical back: Normal range of motion and neck supple.      Right lower leg: Edema (trace) present.      Left lower leg: Edema (trace) present.   Skin:     General: Skin is warm and dry.      Capillary Refill: Capillary refill takes 2 to 3 seconds.   Neurological:      General: No focal deficit present.      Mental Status: She is alert and oriented to person, place, and time. Mental status is at baseline.   Psychiatric:         Mood and Affect: Mood normal.         Behavior: Behavior normal.         Fluids    Intake/Output Summary (Last 24 hours) at 5/30/2021 0957  Last data filed at 5/30/2021 0900  Gross per 24 hour   Intake 320 ml   Output 500 ml   Net -180 ml       Laboratory  Recent  Labs     05/28/21  0029 05/29/21  0415 05/30/21  0433   WBC 7.6 7.5 8.0   RBC 3.55* 3.58* 3.88*   HEMOGLOBIN 11.3* 11.3* 12.1   HEMATOCRIT 33.3* 34.2* 36.6*   MCV 93.8 95.5 94.3   MCH 31.8 31.6 31.2   MCHC 33.9 33.0* 33.1*   RDW 45.5 46.7 45.2   PLATELETCT 134* 148* 158*   MPV 11.9 12.3 11.7     Recent Labs     05/28/21  0029 05/29/21  0415 05/30/21  0433   SODIUM 139 139 138   POTASSIUM 3.3* 3.4* 3.6   CHLORIDE 100 100 98   CO2 28 28 29   GLUCOSE 102* 92 91   BUN 11 12 11   CREATININE 0.92 0.78 0.80   CALCIUM 8.0* 8.1* 8.5                   Imaging  DX-KNEE 3 VIEWS LEFT   Final Result      1.  Tricompartment degenerative change which involves the medial femorotibial articulation to the greatest degree.      2.  Moderate to large joint effusion.      EC-ECHOCARDIOGRAM COMPLETE W/O CONT   Final Result      CT-CTA CHEST PULMONARY ARTERY W/ RECONS   Final Result      1.  No evidence of pulmonary embolus.      2.  Mild emphysematous change of the lungs.      3.  Atherosclerotic vascular calcification.      DX-HIP-COMPLETE - UNILATERAL 2+ LEFT   Final Result      No evidence of left hip fracture or degenerative change.      DX-ANKLE 3+ VIEWS RIGHT   Final Result      No evidence of acute fracture or dislocation.      DX-CHEST-PORTABLE (1 VIEW)   Final Result      Cardiomegaly.           Assessment/Plan  * Dyspnea- (present on admission)  Assessment & Plan  Acute on chronic, symptoms have been going on for months.   Noted Sat drops 89% intermittently, improves with deep breathing    CTA negative for pulmonary embolism, shows some emphysematous changes  Outpatient PFTs within normal limits  ProBNP mildly elevated, Trace edema on BLE  Echo: EF 65%, Normal right ventricular size and systolic function, Mild MR and TR.    Ambulatory O2 eval  Add lasix 20mg daily for gentle diuresis    Hypothyroidism- (present on admission)  Assessment & Plan  TSH: 15, free T4 0.96  Denies recent dose change, Will inc levothyroxine to 100mcg  [place] [facility] [place]  Outpatient PCP follow up is advised with patient and daughter    Knee pain, left  Assessment & Plan  Recent fall  X ray of L knee: mod to large joint effusion  I discussed with Orthopedics Dr. Meza. Drainage is not indicated as this will inc chance of bleeding and infection.  PT/OT    Right ankle pain- (present on admission)  Assessment & Plan  Reports recent fall  Also complaining of left hip and low back pain  X-ray negative for fracture  ESR/CRP elevated  Check JESUS, uric acid, anti-CCP negative  Ace wrap  PT/OT    Mild cognitive impairment- (present on admission)  Assessment & Plan  Continue donezepil  SLP cognitive evaluation: mod cognitive deficit    Fall  Assessment & Plan  Recent fall reported  PT/OT: SNF, referred  Fall precautions    Anxiety- (present on admission)  Assessment & Plan  Continue Lexapro    Cardiac pacemaker in situ- (present on admission)  Assessment & Plan  Hx of   Recently seen by cardiology and had pacemaker interrogated, family reports working normally    Hypokalemia- (present on admission)  Assessment & Plan  Replace as needed    Hyperlipidemia with target LDL less than 100- (present on admission)  Assessment & Plan  Continue atorvastatin       VTE prophylaxis: lovenox

## 2021-05-30 NOTE — CARE PLAN
The patient is Stable - Low risk of patient condition declining or worsening    Shift Goals  Clinical Goals: ambulate safely and pain control  Patient Goals: get stronger  Family Goals: To get well    Progress made toward(s) clinical / shift goals: Strength w/ ambulation improving today. Pt 1 assist w/ front wheel walker to the bedside commode and chair. Worked w/ OT today.       Problem: Knowledge Deficit - Standard  Goal: Patient and family/care givers will demonstrate understanding of plan of care, disease process/condition, diagnostic tests and medications  Outcome: Progressing   Pt AOX4 today, forgetful after receiving the oxycodone.       Problem: Pain - Standard  Goal: Alleviation of pain or a reduction in pain to the patient’s comfort goal  Outcome: Progressing   Pain controlled w/ Oxy and Tylenol

## 2021-05-30 NOTE — DISCHARGE PLANNING
Received Choice form at 2420  Agency/Facility Name: [facility], 2)[facility]  Referral sent per Choice form @ 0204

## 2021-05-31 PROCEDURE — 700102 HCHG RX REV CODE 250 W/ 637 OVERRIDE(OP): Performed by: STUDENT IN AN ORGANIZED HEALTH CARE EDUCATION/TRAINING PROGRAM

## 2021-05-31 PROCEDURE — 700101 HCHG RX REV CODE 250: Performed by: INTERNAL MEDICINE

## 2021-05-31 PROCEDURE — 700111 HCHG RX REV CODE 636 W/ 250 OVERRIDE (IP): Performed by: INTERNAL MEDICINE

## 2021-05-31 PROCEDURE — 770006 HCHG ROOM/CARE - MED/SURG/GYN SEMI*

## 2021-05-31 PROCEDURE — 99233 SBSQ HOSP IP/OBS HIGH 50: CPT | Performed by: INTERNAL MEDICINE

## 2021-05-31 PROCEDURE — A9270 NON-COVERED ITEM OR SERVICE: HCPCS | Performed by: INTERNAL MEDICINE

## 2021-05-31 PROCEDURE — A9270 NON-COVERED ITEM OR SERVICE: HCPCS | Performed by: STUDENT IN AN ORGANIZED HEALTH CARE EDUCATION/TRAINING PROGRAM

## 2021-05-31 PROCEDURE — 97116 GAIT TRAINING THERAPY: CPT

## 2021-05-31 PROCEDURE — 700102 HCHG RX REV CODE 250 W/ 637 OVERRIDE(OP): Performed by: INTERNAL MEDICINE

## 2021-05-31 PROCEDURE — 97530 THERAPEUTIC ACTIVITIES: CPT

## 2021-05-31 RX ORDER — LIDOCAINE 50 MG/G
1 PATCH TOPICAL EVERY 24 HOURS
Status: DISCONTINUED | OUTPATIENT
Start: 2021-05-31 | End: 2021-06-01 | Stop reason: HOSPADM

## 2021-05-31 RX ORDER — ACETAMINOPHEN 325 MG/1
650 TABLET ORAL EVERY 8 HOURS
Status: DISCONTINUED | OUTPATIENT
Start: 2021-05-31 | End: 2021-06-01 | Stop reason: HOSPADM

## 2021-05-31 RX ADMIN — DONEPEZIL HYDROCHLORIDE 10 MG: 5 TABLET, FILM COATED ORAL at 21:18

## 2021-05-31 RX ADMIN — DOCUSATE SODIUM 50 MG AND SENNOSIDES 8.6 MG 2 TABLET: 8.6; 5 TABLET, FILM COATED ORAL at 05:58

## 2021-05-31 RX ADMIN — LEVOTHYROXINE SODIUM 100 MCG: 0.1 TABLET ORAL at 05:58

## 2021-05-31 RX ADMIN — ASPIRIN 81 MG CHEWABLE TABLET 81 MG: 81 TABLET CHEWABLE at 05:58

## 2021-05-31 RX ADMIN — FUROSEMIDE 20 MG: 20 TABLET ORAL at 05:57

## 2021-05-31 RX ADMIN — ACETAMINOPHEN 650 MG: 325 TABLET ORAL at 21:18

## 2021-05-31 RX ADMIN — ATORVASTATIN CALCIUM 40 MG: 40 TABLET, FILM COATED ORAL at 05:57

## 2021-05-31 RX ADMIN — ESCITALOPRAM OXALATE 20 MG: 10 TABLET ORAL at 05:58

## 2021-05-31 RX ADMIN — ENOXAPARIN SODIUM 40 MG: 40 INJECTION SUBCUTANEOUS at 05:57

## 2021-05-31 RX ADMIN — LIDOCAINE 1 PATCH: 50 PATCH TOPICAL at 09:28

## 2021-05-31 RX ADMIN — ACETAMINOPHEN 650 MG: 325 TABLET ORAL at 15:01

## 2021-05-31 RX ADMIN — ACETAMINOPHEN 650 MG: 325 TABLET ORAL at 09:29

## 2021-05-31 ASSESSMENT — ENCOUNTER SYMPTOMS
FALLS: 1
WHEEZING: 0
COUGH: 0
SHORTNESS OF BREATH: 1
DIZZINESS: 1
BACK PAIN: 1
HEMOPTYSIS: 0

## 2021-05-31 ASSESSMENT — COGNITIVE AND FUNCTIONAL STATUS - GENERAL
MOVING TO AND FROM BED TO CHAIR: A LITTLE
SUGGESTED CMS G CODE MODIFIER MOBILITY: CK
WALKING IN HOSPITAL ROOM: A LOT
MOVING FROM LYING ON BACK TO SITTING ON SIDE OF FLAT BED: A LITTLE
CLIMB 3 TO 5 STEPS WITH RAILING: TOTAL
STANDING UP FROM CHAIR USING ARMS: A LITTLE
TURNING FROM BACK TO SIDE WHILE IN FLAT BAD: A LITTLE
MOBILITY SCORE: 15

## 2021-05-31 ASSESSMENT — GAIT ASSESSMENTS
DISTANCE (FEET): 5
ASSISTIVE DEVICE: FRONT WHEEL WALKER
GAIT LEVEL OF ASSIST: MODERATE ASSIST
DEVIATION: BRADYKINETIC;SHUFFLED GAIT

## 2021-05-31 ASSESSMENT — PAIN DESCRIPTION - PAIN TYPE
TYPE: ACUTE PAIN
TYPE: ACUTE PAIN

## 2021-05-31 NOTE — CARE PLAN
The patient is Stable - Low risk of patient condition declining or worsening    Shift Goals  Clinical Goals: Ambulate 3 times daily with staff/PT  Patient Goals: get stronger  Family Goals: To get well    Progress made toward(s) clinical / shift goals:  Patient ambulated with staff from bed to bedside commode and chair.     Patient is not progressing towards the following goals:

## 2021-05-31 NOTE — HOSPITAL COURSE
----- Message from Ulisses Pearce MD sent at 4/28/2021  8:43 AM CDT -----  Please notify patient that COVID-19 test is negative, which we needed to confirm prior to upcoming procedure. Self-quarantine should be maintained until then. Call us if any questions      81 y.o. female with history of hypothyoidism, hyperlipidemia, cerebral aneurysm, pacemaker, who was admitted 5/27/2021 with increased shortness of breath, weakness and R ankle, L knee and L hip pain.  Admission CTA showed no pulmonary embolus, mild emphysematous changes. Echocardiogram showed: EF 65%, Normal right ventricular size and systolic function, Mild MR and TR. Patient had recent PFTs with normal FEV1/FVC ratio and has been seen by outpatient cardiology and pulmonology. She also had x-rays of left hip and right ankle, they were negative for fractures.  There was moderate to large joint effusion.  This was discussed with orthopedic surgery, and drainage was not recommended as it would increase the chance of bleeding or infection.  Patient was evaluated by physical therapy, who recommended SNF placement for further rehab prior to discharge home. Per daughter, Patient lives herself, and she is still able to complete her ADLs as her house is fully handicap accessible.  Throughout hospitalization patient has had some delirium, attributed to hospitalization and medications.

## 2021-05-31 NOTE — THERAPY
Physical Therapy   Daily Treatment     Patient Name: Kaila Choudhary  Age:  81 y.o., Sex:  female  Medical Record #: [id number]  Today's Date: 5/31/2021     Precautions: (P) Fall Risk    Assessment    Pt is progressing slower than expected with functional mobility. Pt continues to be limited by low back pain, weakness, fatigue, and poor activity tolerance. Pt demonstrated with SOB and inc in WOB with all upright activity and required frequent rest breaks. Pt was only able to tolerate a few steps forward and back and participate in transfers along with steven care and was unable to participate in any more mobility. Pt encouraged to sit up in chair for meals. Had discussion about HH vs. SNF with family and benefits of each. At this time pt would most likely benefit from post acute therapy prior to d/c home.     Plan    Continue current treatment plan.    DC Equipment Recommendations: (P) Unable to determine at this time  Discharge Recommendations: (P) Recommend post-acute placement for additional physical therapy services prior to discharge home    Objective       05/31/21 1006   Precautions   Precautions Fall Risk   Pain 0 - 10 Group   Location Back   Location Orientation Right   Description Aching;Sore   Therapist Pain Assessment Prior to Activity;During Activity;Post Activity;Nurse Notified  (c/o moderate pain with mobility; not rated)   Cognition    Cognition / Consciousness WDL   Level of Consciousness Alert   Comments appears to be alert today with less confusion   Neuro-Muscular Treatments   Neuro-Muscular Treatments Weight Shift Right;Weight Shift Left;Anterior weight shift   Comments during standing on BLE    Other Treatments   Other Treatments Provided pt educated on mobilizing to chair at least 3x/day to prevent further deconditioning    Neurological Concerns   Neurological Concerns No   Balance   Sitting Balance (Static) Fair   Sitting Balance (Dynamic) Fair -   Standing Balance (Static) Fair -   Standing  Balance (Dynamic) Fair -   Weight Shift Sitting Fair   Weight Shift Standing Fair   Skilled Intervention Verbal Cuing;Postural Facilitation;Facilitation   Comments w/fww   Gait Analysis   Gait Level Of Assist Moderate Assist   Assistive Device Front Wheel Walker   Distance (Feet) 5   # of Times Distance was Traveled 2   Deviation Bradykinetic;Shuffled Gait   Weight Bearing Status full   Skilled Intervention Postural Facilitation;Verbal Cuing;Facilitation   Comments presents with poor foot clearence and strength for functional ambulation at this time; safe for transfers    Bed Mobility    Supine to Sit Minimal Assist   Sit to Supine Moderate Assist   Scooting Minimal Assist   Skilled Intervention Verbal Cuing;Tactile Cuing   Comments HOB elevated and rails up    Functional Mobility   Sit to Stand Minimal Assist   Bed, Chair, Wheelchair Transfer Minimal Assist   Toilet Transfers Minimal Assist  (BSC )   Transfer Method Stand Step   Mobility EOB, sit<>stand, few steps forward and back, marching, weight shifts, transfer to bsc, then back to bed    Skilled Intervention Postural Facilitation;Verbal Cuing;Compensatory Strategies   How much difficulty does the patient currently have...   Turning over in bed (including adjusting bedclothes, sheets and blankets)? 3   Sitting down on and standing up from a chair with arms (e.g., wheelchair, bedside commode, etc.) 3   Moving from lying on back to sitting on the side of the bed? 3   How much help from another person does the patient currently need...   Moving to and from a bed to a chair (including a wheelchair)? 3   Need to walk in a hospital room? 2   Climbing 3-5 steps with a railing? 1   6 clicks Mobility Score 15   Activity Tolerance   Sitting in Chair 10 mins in BSC    Sitting Edge of Bed 5 mins   Standing 5 mins   Comments limited due to fatigue and weakness    Patient / Family Goals    Patient / Family Goal #1 not stated   Short Term Goals    Short Term Goal # 1 Pt to be  I with bed mob in 6 V   Goal Outcome # 1 Progressing slower than expected   Short Term Goal # 2 Pt to be S with transfers in 6 V so can go home   Goal Outcome # 2 Progressing slower than expected   Short Term Goal # 3 Pt to be S with ambulation x 200 feet in 6 V so can go home   Goal Outcome # 3 Progressing slower than expected   Short Term Goal # 4 Pt to be S x 3 stairs in 6 V so can get into house   Goal Outcome # 4 Progressing slower than expected   Education Group   Education Provided Role of Physical Therapist   Role of Physical Therapist Patient Response Patient;Acceptance;Demonstration;Explanation;Verbal Demonstration;Action Demonstration   Anticipated Discharge Equipment and Recommendations   DC Equipment Recommendations Unable to determine at this time   Discharge Recommendations Recommend post-acute placement for additional physical therapy services prior to discharge home   Interdisciplinary Plan of Care Collaboration   IDT Collaboration with  Nursing;Family / Caregiver   Patient Position at End of Therapy In Bed;Call Light within Reach;Tray Table within Reach;Family / Friend in Room;Phone within Reach   Collaboration Comments aware of visit and recs    Session Information   Date / Session Number  5/31-2 (2/5, 6/3)

## 2021-05-31 NOTE — PROGRESS NOTES
[facility] Medicine Daily Progress Note    Date of Service  5/31/2021    Chief Complaint  Shortness of breath and weakness in the right ankle, left knee, pain after fall    Hospital Course  81 y.o. female with history of hypothyoidism, hyperlipidemia, cerebral aneurysm, pacemaker, who was admitted 5/27/2021 with increased shortness of breath, weakness and R ankle, L knee and L hip pain.  Admission CTA showed no pulmonary embolus, mild emphysematous changes. Echocardiogram showed: EF 65%, Normal right ventricular size and systolic function, Mild MR and TR. Patient had recent PFTs with normal FEV1/FVC ratio and has been seen by outpatient cardiology and pulmonology. She also had x-rays of left hip and right ankle, they were negative for fractures.  There was moderate to large joint effusion.  This was discussed with orthopedic surgery, and drainage was not recommended as it would increase the chance of bleeding or infection.  Patient was evaluated by physical therapy, who recommended SNF placement for further rehab prior to discharge home. Per daughter, Patient lives herself, and she is still able to complete her ADLs as her house is fully handicap accessible.  Throughout hospitalization patient has had some delirium, attributed to hospitalization and medications.    Interval Problem Update  Seen examined this morning at bedside, still having some knee pain when she walks.  Has not been delirious on opioids, will schedule Tylenol, and lidocaine patch to be and Voltaren gel.  Has small dose of opiates as needed for breakthrough pain.  We will continue to monitor.    Discussed extensively with patient and family.  Plan is for SNF, pending acceptance    Consultants/Specialty  orthopedics    Code Status  DNAR/DNI    Disposition  Anticipated discharge to SNF when accepted    Review of Systems  Review of Systems   Respiratory: Positive for shortness of breath. Negative for cough, hemoptysis and wheezing.    Musculoskeletal:  Positive for back pain, falls and joint pain.        Knee swelling   Neurological: Positive for dizziness.   All other systems reviewed and are negative.       Physical Exam  Temp:  [36.2 °C (97.2 °F)-36.6 °C (97.9 °F)] 36.2 °C (97.2 °F)  Pulse:  [59-75] 70  Resp:  [18-20] 18  BP: ()/(60-70) 108/64  SpO2:  [90 %-96 %] 96 %    Physical Exam  Vitals and nursing note reviewed.   Constitutional:       General: She is not in acute distress.     Appearance: Normal appearance. She is not ill-appearing.   Cardiovascular:      Rate and Rhythm: Normal rate and regular rhythm.      Pulses: Normal pulses.      Heart sounds: Normal heart sounds.   Pulmonary:      Effort: Pulmonary effort is normal. No respiratory distress.      Breath sounds: Normal breath sounds. No wheezing or rales.   Abdominal:      General: Abdomen is flat. Bowel sounds are normal. There is no distension.      Palpations: Abdomen is soft.      Tenderness: There is no abdominal tenderness.   Musculoskeletal:         General: Swelling (L knee swelling) present. No tenderness. Normal range of motion.      Cervical back: Normal range of motion and neck supple.      Right lower leg: No edema (trace).      Left lower leg: No edema (trace).   Skin:     General: Skin is warm and dry.   Neurological:      General: No focal deficit present.      Mental Status: She is alert and oriented to person, place, and time. Mental status is at baseline.   Psychiatric:         Mood and Affect: Mood normal.         Behavior: Behavior normal.         Fluids  No intake or output data in the 24 hours ending 05/31/21 1217    Laboratory  Recent Labs     05/29/21 0415 05/30/21  0433   WBC 7.5 8.0   RBC 3.58* 3.88*   HEMOGLOBIN 11.3* 12.1   HEMATOCRIT 34.2* 36.6*   MCV 95.5 94.3   MCH 31.6 31.2   MCHC 33.0* 33.1*   RDW 46.7 45.2   PLATELETCT 148* 158*   MPV 12.3 11.7     Recent Labs     05/29/21 0415 05/30/21  0433   SODIUM 139 138   POTASSIUM 3.4* 3.6   CHLORIDE 100 98   CO2 28  29   GLUCOSE 92 91   BUN 12 11   CREATININE 0.78 0.80   CALCIUM 8.1* 8.5                   Imaging  DX-KNEE 3 VIEWS LEFT   Final Result      1.  Tricompartment degenerative change which involves the medial femorotibial articulation to the greatest degree.      2.  Moderate to large joint effusion.      EC-ECHOCARDIOGRAM COMPLETE W/O CONT   Final Result      CT-CTA CHEST PULMONARY ARTERY W/ RECONS   Final Result      1.  No evidence of pulmonary embolus.      2.  Mild emphysematous change of the lungs.      3.  Atherosclerotic vascular calcification.      DX-HIP-COMPLETE - UNILATERAL 2+ LEFT   Final Result      No evidence of left hip fracture or degenerative change.      DX-ANKLE 3+ VIEWS RIGHT   Final Result      No evidence of acute fracture or dislocation.      DX-CHEST-PORTABLE (1 VIEW)   Final Result      Cardiomegaly.           Assessment/Plan  * Dyspnea- (present on admission)  Assessment & Plan  Acute on chronic, symptoms have been going on for months.   Noted Sat drops 89% intermittently, improves with deep breathing    CTA negative for pulmonary embolism, shows some emphysematous changes  Outpatient PFTs within normal limits  ProBNP mildly elevated, Trace edema on BLE  Echo: EF 65%, Normal right ventricular size and systolic function, Mild MR and TR.    Ambulatory O2 eval  Add lasix 20mg daily for gentle diuresis, continue to assess daily    Knee pain, left  Assessment & Plan  Recent fall  X ray of L knee: mod to large joint effusion  I discussed with Orthopedics Dr. Meza. Drainage is not indicated as this will inc chance of bleeding and infection.  PT/OT    Right ankle pain- (present on admission)  Assessment & Plan  Reports recent fall  Also complaining of left hip and low back pain  X-ray negative for fracture  ESR/CRP elevated   JESUS, uric acid, anti-CCP negative  Ace wrap, Voltaren gel  scheduled Tylenol and as needed oxycodone for severe pain  PT/OT recommending placement, pending  acceptance    Fall  Assessment & Plan  Recent fall reported  PT/OT: SNF, referred  Fall precautions    Anxiety- (present on admission)  Assessment & Plan  Continue Lexapro    Mild cognitive impairment- (present on admission)  Assessment & Plan  Continue donezepil  SLP cognitive evaluation: mod cognitive deficit    Cardiac pacemaker in situ- (present on admission)  Assessment & Plan  Hx of   Recently seen by cardiology and had pacemaker interrogated, family reports working normally    Hypokalemia- (present on admission)  Assessment & Plan  Replace as needed    Hypothyroidism- (present on admission)  Assessment & Plan  TSH: 15, free T4 0.96  Denies recent dose change, levothyroxine increased to 100mcg from 88mcg  Outpatient PCP follow up is advised with patient and daughter    Hyperlipidemia with target LDL less than 100- (present on admission)  Assessment & Plan  Continue atorvastatin       VTE prophylaxis: lovenox

## 2021-05-31 NOTE — PROGRESS NOTES
Assumed care of the patient at 1900. She continues to be confused about where she is and why she is here. Family and RN spoke with patient about why she is in the hospital. VSS. She denies any needs. Bed in the lowest locked position, bed alarm active, call light in reach.

## 2021-06-01 VITALS
HEIGHT: 61 IN | HEART RATE: 97 BPM | TEMPERATURE: 97.5 F | BODY MASS INDEX: 27.85 KG/M2 | DIASTOLIC BLOOD PRESSURE: 49 MMHG | RESPIRATION RATE: 17 BRPM | OXYGEN SATURATION: 90 % | SYSTOLIC BLOOD PRESSURE: 108 MMHG | WEIGHT: 147.49 LBS

## 2021-06-01 LAB
ANION GAP SERPL CALC-SCNC: 15 MMOL/L (ref 7–16)
BACTERIA BLD CULT: NORMAL
BUN SERPL-MCNC: 25 MG/DL (ref 8–22)
CALCIUM SERPL-MCNC: 8.6 MG/DL (ref 8.4–10.2)
CHLORIDE SERPL-SCNC: 95 MMOL/L (ref 96–112)
CO2 SERPL-SCNC: 30 MMOL/L (ref 20–33)
CREAT SERPL-MCNC: 1.36 MG/DL (ref 0.5–1.4)
ERYTHROCYTE [DISTWIDTH] IN BLOOD BY AUTOMATED COUNT: 46.6 FL (ref 35.9–50)
GLUCOSE SERPL-MCNC: 90 MG/DL (ref 65–99)
HCT VFR BLD AUTO: 35.4 % (ref 37–47)
HGB BLD-MCNC: 11.6 G/DL (ref 12–16)
MAGNESIUM SERPL-MCNC: 1.6 MG/DL (ref 1.5–2.5)
MCH RBC QN AUTO: 31.4 PG (ref 27–33)
MCHC RBC AUTO-ENTMCNC: 32.8 G/DL (ref 33.6–35)
MCV RBC AUTO: 95.9 FL (ref 81.4–97.8)
PLATELET # BLD AUTO: 193 K/UL (ref 164–446)
PMV BLD AUTO: 12.3 FL (ref 9–12.9)
POTASSIUM SERPL-SCNC: 3.4 MMOL/L (ref 3.6–5.5)
RBC # BLD AUTO: 3.69 M/UL (ref 4.2–5.4)
SIGNIFICANT IND 70042: NORMAL
SITE SITE: NORMAL
SODIUM SERPL-SCNC: 140 MMOL/L (ref 135–145)
SOURCE SOURCE: NORMAL
WBC # BLD AUTO: 7.8 K/UL (ref 4.8–10.8)

## 2021-06-01 PROCEDURE — 700101 HCHG RX REV CODE 250: Performed by: INTERNAL MEDICINE

## 2021-06-01 PROCEDURE — 83735 ASSAY OF MAGNESIUM: CPT

## 2021-06-01 PROCEDURE — 99239 HOSP IP/OBS DSCHRG MGMT >30: CPT | Performed by: INTERNAL MEDICINE

## 2021-06-01 PROCEDURE — 36415 COLL VENOUS BLD VENIPUNCTURE: CPT

## 2021-06-01 PROCEDURE — 85027 COMPLETE CBC AUTOMATED: CPT

## 2021-06-01 PROCEDURE — 700102 HCHG RX REV CODE 250 W/ 637 OVERRIDE(OP): Performed by: STUDENT IN AN ORGANIZED HEALTH CARE EDUCATION/TRAINING PROGRAM

## 2021-06-01 PROCEDURE — 700102 HCHG RX REV CODE 250 W/ 637 OVERRIDE(OP): Performed by: INTERNAL MEDICINE

## 2021-06-01 PROCEDURE — A9270 NON-COVERED ITEM OR SERVICE: HCPCS | Performed by: INTERNAL MEDICINE

## 2021-06-01 PROCEDURE — A9270 NON-COVERED ITEM OR SERVICE: HCPCS | Performed by: STUDENT IN AN ORGANIZED HEALTH CARE EDUCATION/TRAINING PROGRAM

## 2021-06-01 PROCEDURE — 700111 HCHG RX REV CODE 636 W/ 250 OVERRIDE (IP): Performed by: INTERNAL MEDICINE

## 2021-06-01 PROCEDURE — 80048 BASIC METABOLIC PNL TOTAL CA: CPT

## 2021-06-01 RX ORDER — LIDOCAINE 50 MG/G
1 PATCH TOPICAL EVERY 24 HOURS
Qty: 10 PATCH | Status: SHIPPED
Start: 2021-06-02 | End: 2022-03-25

## 2021-06-01 RX ADMIN — ACETAMINOPHEN 650 MG: 325 TABLET ORAL at 05:09

## 2021-06-01 RX ADMIN — ESCITALOPRAM OXALATE 20 MG: 10 TABLET ORAL at 05:09

## 2021-06-01 RX ADMIN — FUROSEMIDE 20 MG: 20 TABLET ORAL at 05:09

## 2021-06-01 RX ADMIN — ENOXAPARIN SODIUM 40 MG: 40 INJECTION SUBCUTANEOUS at 05:10

## 2021-06-01 RX ADMIN — ASPIRIN 81 MG CHEWABLE TABLET 81 MG: 81 TABLET CHEWABLE at 05:09

## 2021-06-01 RX ADMIN — LEVOTHYROXINE SODIUM 100 MCG: 0.1 TABLET ORAL at 05:10

## 2021-06-01 RX ADMIN — LIDOCAINE 1 PATCH: 50 PATCH TOPICAL at 09:18

## 2021-06-01 RX ADMIN — ATORVASTATIN CALCIUM 40 MG: 40 TABLET, FILM COATED ORAL at 05:10

## 2021-06-01 ASSESSMENT — PAIN DESCRIPTION - PAIN TYPE: TYPE: ACUTE PAIN

## 2021-06-01 NOTE — DISCHARGE SUMMARY
Discharge Summary    CHIEF COMPLAINT ON ADMISSION  No chief complaint on file.      Reason for Admission  swollen ankle, difficulty walking     CODE STATUS  DNAR/DNI    HPI & HOSPITAL COURSE  This is a 81 y.o. female here with 1 week of SOB and weakness. She also complained of right ankle, left hip, and left knee pain without fall or injury.   CTA was negative for PE, showed mild emphysema. TTE showed EF 65%, mild MR and TR, nor RV function. She was edematous so was given oral lasix, which was stopped 6/1/21 after rise in BUN/creat. Her SOB did improve, still has some WALLACE which may be due to deconditioning. Her TSH was elevated but free T4 was normal.  XR of her ankle and hip did not show fracture or dislocation. Uric acid was low, JESUS and anti-CCP were negative. 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 Case was discussed with ortho, who did not recommend aspiration as it did not appear septic arthritis. Her pain was managed with topical lidocaine. Her ankle was given ACE wrap for comfort.  She was evaluated by PTOT who recommended SNF.    Therefore, she is discharged in good and stable condition to skilled nursing facility.    The patient met 2-midnight criteria for an inpatient stay at the time of discharge.      FOLLOW UP ITEMS POST DISCHARGE  Repeat renal function to ensure BUN/creat normalizes in 2-3 days    DISCHARGE DIAGNOSES  Principal Problem:    Dyspnea POA: Yes  Active Problems:    Hyperlipidemia with target LDL less than 100 POA: Yes    Hypothyroidism POA: Yes    Hypokalemia POA: Yes    Cardiac pacemaker in situ POA: Yes    Mild cognitive impairment POA: Yes    Anxiety POA: Yes    Fall POA: Unknown    Right ankle pain POA: Yes    Knee pain, left POA: Unknown  Resolved Problems:    * No resolved hospital problems. *      FOLLOW UP  No follow-up provider specified.    MEDICATIONS ON DISCHARGE     Medication List      START taking these medications      Instructions   lidocaine 5 %  Ptch  Start taking on: June 2, 2021  Commonly known as: LIDODERM   Place 1 Patch on the skin every 24 hours.  Dose: 1 Patch        CONTINUE taking these medications      Instructions   aspirin 81 MG Chew chewable tablet  Commonly known as: ASA   Take 81 mg by mouth every morning.  Dose: 81 mg     atorvastatin 40 MG Tabs  Commonly known as: Lipitor   Take 1 Tab by mouth every day.  Dose: 40 mg     CALCIUM 600/VITAMIN D PO   Take  by mouth.     cholestyramine 4 g packet  Commonly known as: QUESTRAN      donepezil 10 MG tablet  Commonly known as: ARICEPT   TAKE 1 TABLET BY MOUTH  EVERY EVENING     escitalopram 20 MG tablet  Commonly known as: LEXAPRO   TAKE 1 TABLET BY MOUTH  DAILY     levothyroxine 88 MCG Tabs  Commonly known as: SYNTHROID   TAKE 1 TABLET BY MOUTH IN  THE MORNING ON AN EMPTY  STOMACH            Allergies  Allergies   Allergen Reactions   • Sulfa Drugs Rash and Swelling     Anasarca-especially face   • Latex Rash and Itching   • Tape Rash     Latex tape  (paper tape OK)       DIET  Orders Placed This Encounter   Procedures   • Diet Order Diet: Cardiac     Standing Status:   Standing     Number of Occurrences:   1     Order Specific Question:   Diet:     Answer:   Cardiac [6]       ACTIVITY  As tolerated.  Weight bearing as tolerated    LINES, DRAINS, AND WOUNDS  This is an automated list. Peripheral IVs will be removed prior to discharge.  Peripheral IV 05/27/21 20 G Left Antecubital (Active)   Site Assessment Clean;Dry;Intact 06/01/21 0915   Dressing Type Transparent 06/01/21 0915   Line Status Saline locked;Scrubbed the hub prior to access;Flushed 06/01/21 0915   Dressing Status Clean;Dry;Intact 06/01/21 0915   Dressing Intervention N/A 06/01/21 0915   Infiltration Grading ([facility] 0 06/01/21 0915   Phlebitis Scale ([facility]) 0 06/01/21 0915          Peripheral IV 05/27/21 20 G Left Antecubital (Active)   Site Assessment Clean;Dry;Intact 06/01/21 0915   Dressing Type Transparent 06/01/21  0915   Line Status Saline locked;Scrubbed the hub prior to access;Flushed 06/01/21 0915   Dressing Status Clean;Dry;Intact 06/01/21 0915   Dressing Intervention N/A 06/01/21 0915   Infiltration Grading ([facility], [facility] [place]) 0 06/01/21 0915   Phlebitis Scale ([facility] Only) 0 06/01/21 0915               MENTAL STATUS ON TRANSFER             CONSULTATIONS  None    PROCEDURES  DX-KNEE 3 VIEWS LEFT   Final Result      1.  Tricompartment degenerative change which involves the medial femorotibial articulation to the greatest degree.      2.  Moderate to large joint effusion.      EC-ECHOCARDIOGRAM COMPLETE W/O CONT   Final Result      CT-CTA CHEST PULMONARY ARTERY W/ RECONS   Final Result      1.  No evidence of pulmonary embolus.      2.  Mild emphysematous change of the lungs.      3.  Atherosclerotic vascular calcification.      DX-HIP-COMPLETE - UNILATERAL 2+ LEFT   Final Result      No evidence of left hip fracture or degenerative change.      DX-ANKLE 3+ VIEWS RIGHT   Final Result      No evidence of acute fracture or dislocation.      DX-CHEST-PORTABLE (1 VIEW)   Final Result      Cardiomegaly.        Echocardiography Laboratory     CONCLUSIONS  Prior echo 10/14/16. By comparison, left atrial enlargement is now   present. Mitral insufficiency is now more prominent.  .Normal left ventricular systolic function.  Left ventricular ejection fraction is visually estimated to be 65%.  Normal left ventricular wall thickness.  Pacer/ICD wire seen in right ventricle.  Normal right ventricular size and systolic function.  Mildly dilated left atrium.  Mild mitral regurgitation.  Structurally normal aortic valve without significant stenosis or   regurgitation.  Mild tricuspid regurgitation.  Estimated right ventricular systolic pressure  is 27 mmHg.  No pericardial effusion seen.    LABORATORY  Lab Results   Component Value Date    SODIUM 140 06/01/2021    POTASSIUM 3.4 (L) 06/01/2021    CHLORIDE 95 (L) 06/01/2021    CO2 30 06/01/2021     GLUCOSE 90 06/01/2021    BUN 25 (H) 06/01/2021    CREATININE 1.36 06/01/2021    CREATININE 0.8 03/03/2005        Lab Results   Component Value Date    WBC 7.8 06/01/2021    HEMOGLOBIN 11.6 (L) 06/01/2021    HEMATOCRIT 35.4 (L) 06/01/2021    PLATELETCT 193 06/01/2021        Total time of the discharge process exceeds 32 minutes.

## 2021-06-01 NOTE — THERAPY
Missed Therapy     Patient Name: Kaila Choudhary  Age:  81 y.o., Sex:  female  Medical Record #: [id number]  Today's Date: 6/1/2021    Discussed missed therapy with RN       06/01/21 1103   Interdisciplinary Plan of Care Collaboration   Collaboration Comments Attempted OT session, pt fatigued from walking to BR with nursing, and hesitant to do more with OT as she believes she is being moved to SNF today.  Confirmed with nsg that pt is to be transferred in the next 2 hours.  Will hold therapy today to conserve pt's energy.  Will keep on service in case transfer doesn't happen

## 2021-06-01 NOTE — CARE PLAN
Problem: Knowledge Deficit - Standard  Goal: Patient and family/care givers will demonstrate understanding of plan of care, disease process/condition, diagnostic tests and medications  Outcome: Progressing     Problem: Fall Risk  Goal: Patient will remain free from falls  Outcome: Progressing   The patient is Stable - Low risk of patient condition declining or worsening    Shift Goals  Clinical Goals: ambulate 3x today, work w/ PT/OT  Patient Goals: improve strength  Family Goals: To get well    Progress made toward(s) clinical / shift goals:  pt ambulated to bathroom with assistance.     Patient is not progressing towards the following goals:

## 2021-06-01 NOTE — DISCHARGE PLANNING
Anticipated Discharge Disposition: SNF     Action: Pt discussed during morning rounds. Per Dr. Cleveland pt medically cleared. Referrals placed to SNF 5/30. #1- [facility] #2- [facility].     Epic showing that pt accepted by [facility]. Referral pending for [facility].     LSW called Allison [phone] with [facility] to see if there are any beds available. Allison confirmed that a bed is available and wheelchair transport scheduled for 1230.     Barriers to Discharge: None    Plan: LSW to assist as needed     Addendum 1002  LSW met with pt and pt's daughter at bedside. LSW informed that [facility] has accepted, bed available, and transport confirmed for 1230.     Pt's daughter very hopeful for [facility]. LSW was requested to please f/u and see if they have any updates on pt's referral. If no beds or updates available then transfer to [facility] is okay.     LSW provided copy of IMM. Pt able to sign IMM.     Addendum 1008  LSW faxed signed IMM to Lola [facility]YSON.      Addendum 1020  LSW attempted to meet with pt and pt's daughter at bedside and daughter, Pastora no longer at bedside.     LSW called Pastora [phone] to provide an update. Pastora aware that [facility] allowing visitors. If pt is not vaccinated, visitors required to wear N-95, will need to schedule visits, and complete form. Allison will attempt to ensure pt has a window available in her room. However, visitors are allowed.     Pastora also aware that LSW is waiting for [facility] to call LSW back with update.     Addendum 1056  LSW called [facility] to see if any updates available on bed availability. LSW informed by Luh that there is a bed available today and transport confirmed for 1400 through their transport van.     LSW called [facility] and spoke to Janice. LSW requested transport be cancelled for [facility]. Janice took note and would notify Allison.     LSW messaged Dr. Cleveland and bedside RN, Marie with transport time update.     Addendum 1045  LSW called  pt's daughter, Pastroa [phone] to provide update. Pastora aware that transport to [facility] scheduled for 1400.      [facility]#- [id number]    Addendum 1147  LSW met with pt at bedside to collect signature for COBRA.   LSW able to collect signature for COBRA from Dr. Cleveland during rounds.     D/c summary now available. LSW able to complete transfer packet and place in pt's chart.       Care Transition Team Assessment  The information gathered for this assessment was gathered through chart review.     Information Source  Orientation Level: Oriented to place, Oriented to situation, Oriented to person  Information Given By: Other (Comments)  Informant's Name: Pastora  Who is responsible for making decisions for patient? : Patient    Elopement Risk  Legal Hold: No  Ambulatory or Self Mobile in Wheelchair: No-Not an Elopement Risk  Disoriented: No  Psychiatric Symptoms: None  History of Wandering: No  Elopement this Admit: No  Vocalizing Wanting to Leave: No  Displays Behaviors, Body Language Wanting to Leave: No-Not at Risk for Elopement  Elopement Risk: Not at Risk for Elopement    Interdisciplinary Discharge Planning  Lives with - Patient's Self Care Capacity: Alone and Able to Care For Self  Patient or legal guardian wants to designate a caregiver: No  Housing / Facility: 2 Story House  Prior Services: Intermittent Physical Support for ADL Per Family    Discharge Preparedness  What is your plan after discharge?: Skilled nursing facility  What are your discharge supports?: Child  Prior Functional Level: Independent with Activities of Daily Living, Independent with Medication Management    Functional Assesment  Prior Functional Level: Independent with Activities of Daily Living, Independent with Medication Management    Finances  Financial Barriers to Discharge: No  Prescription Coverage: Yes    Vision / Hearing Impairment  Vision Impairment : Yes  Right Eye Vision: Impaired, Wears Glasses  Left Eye Vision: Impaired, Wears  Glasses  Hearing Impairment : Yes  Hearing Impairment: Both Ears, Hearing Device(s) Available  Does Pt Need Special Equipment for the Hearing Impaired?: No    Domestic Abuse  Have you ever been the victim of abuse or violence?: No  Physical Abuse or Sexual Abuse: No  Verbal Abuse or Emotional Abuse: No  Possible Abuse/Neglect Reported to:: Not Applicable    Psychological Assessment  History of Substance Abuse: None  History of Psychiatric Problems: No    Discharge Risks or Barriers  Discharge risks or barriers?: No  Patient risk factors: Vulnerable adult    Anticipated Discharge Information  Discharge Disposition: D/T to SNF with Medicare cert in anticipation of skilled care (03)

## 2021-06-01 NOTE — PROGRESS NOTES
Discharge order written. IV removed, patient tolerated. All personal belongings are in possession. AVS printed, reviewed and copy signed and placed on the chart. Patient has no further questions.Discharged in satisfactory condition.PT left unit with medical transport  via wheelchair. Staff escort  NA.   cobra sent with pt and transport, pt going to [facility].

## 2021-06-01 NOTE — DISCHARGE INSTRUCTIONS
Discharge Instructions    Discharged to other by medical transportation with self. Discharged via wheelchair, hospital escort: Yes.  Special equipment needed: Not Applicable    Be sure to schedule a follow-up appointment with your primary care doctor or any specialists as instructed.     Discharge Plan:   Diet Plan: Discussed  Activity Level: Discussed  Confirmed Follow up Appointment: No (Comments) (pt to skilled)  Confirmed Symptoms Management: Discussed  Medication Reconciliation Updated: Yes    I understand that a diet low in cholesterol, fat, and sodium is recommended for good health. Unless I have been given specific instructions below for another diet, I accept this instruction as my diet prescription.   Other diet: cardiac    Special Instructions: None    · Is patient discharged on Warfarin / Coumadin?   No     Depression / Suicide Risk    As you are discharged from this [facility], it is important to learn how to keep safe from harming yourself.    Recognize the warning signs:  · Abrupt changes in personality, positive or negative- including increase in energy   · Giving away possessions  · Change in eating patterns- significant weight changes-  positive or negative  · Change in sleeping patterns- unable to sleep or sleeping all the time   · Unwillingness or inability to communicate  · Depression  · Unusual sadness, discouragement and loneliness  · Talk of wanting to die  · Neglect of personal appearance   · Rebelliousness- reckless behavior  · Withdrawal from people/activities they love  · Confusion- inability to concentrate     If you or a loved one observes any of these behaviors or has concerns about self-harm, here's what you can do:  · Talk about it- your feelings and reasons for harming yourself  · Remove any means that you might use to hurt yourself (examples: pills, rope, extension cords, firearm)  · Get professional help from the community (Mental Health, Substance Abuse, psychological  counseling)  · Do not be alone:Call your Safe Contact- someone whom you trust who will be there for you.  · Call your local CRISIS HOTLINE 672-9387 or 957-930-2430  · Call your local Children's Mobile Crisis Response Team [place] (384) 891-2332 or www.CareerImp  · Call the toll free National Suicide Prevention Hotlines   · National Suicide Prevention Lifeline 011-933-SLNT (7302)  · [employer] Line Network 800-SUICIDE (815-2355)      Understanding Your Risk for Falls  Each year, millions of people suffer serious injuries from falls. It is important to understand your risk for falling. Talk with your health care provider about your risk and what you can do to lower it. There are actions you can take at home to lower your risk.  If you do have a serious fall, it is important to tell your health care provider. Falling once raises your risk for falling again.  How can falls affect me?  Serious injuries from falls are common. These include:  · Broken bones. Most hip fractures are caused by falls.  · Traumatic brain injury (TBI). Falls are the most common cause of TBI.  Fear of falling can also cause you to avoid activities and stay at home. This can make your muscles weaker and actually raise your risk for a fall.  What can increase my risk?  Serious injuries from a fall most often happen to people older than age 65. Children and young adults ages 15-29 are also at higher risk. The more risk factors you have for falling, the higher your risk. Risk factors include:  · Weakness in the lower body.  · Lack (deficiency) of vitamin D.  · Weak bones (osteoporosis).  · Being generally weak or confused due to long-term (chronic) illness.  · Dizziness or balance problems.  · Poor vision.  · Having depression.  · Medicine that causes dizziness or drowsiness. These can include medicines for your blood pressure, heart, anxiety, insomnia, or edema, as well as pain medicines and muscle relaxants.  · Drinking  alcohol.  · Foot pain or improper footwear.  · Working at a dangerous job.  · Having had a fall in the past.  · Tripping hazards at home, such as floor clutter or loose rugs, or poor lighting.  · Having pets or clutter in your home.  What actions can I take to lower my risk of falling?         · Maintain physical fitness:  ? Do strength and balance exercises. Consider taking a regular class to build strength and balance. Yoga and nathaniel chi are good options.  ? Have your eyes checked every year and your vision prescription updated as needed.  · Remove all clutter from walkways and stairways, including extension cords.  · Use a cordless phone.  · Do not use throw rugs. Make sure all carpeting is taped or tacked down securely.  · Use good lighting in all rooms. Keep a flashlight near your bed.  · Make sure there is a clear path from your bed to the bathroom. Use night-lights.  · Install grab bars for your tub, shower, and toilet. Use a bath mat in your tub or shower.  · Attach secure railings on both sides of your stairs.  · Repair uneven or broken steps.  · Use a cane or walker as directed by your health care provider.  · Wear nonskid shoes. Do not wear high heels. Do not walk around the house in socks or slippers.  · Avoid walking on icy or slippery surfaces. Walk on the grass instead of on icy or slick sidewalks. Where you can, use ice melt to get rid of ice on walkways.  Questions to ask your health care provider  · Can you help me evaluate my risk for a fall?  · Do any of my medicines make me more likely to fall?  · Should I take a vitamin D supplement?  · What exercises can I do to improve my strength and balance?  · Should I make an appointment to have my vision checked?  · Do I need a bone density test to check for osteoporosis?  · Would it help to use a cane or a walker?  Where to find more information  · Centers for Disease Control and Prevention, STEAD[employer]: cdc.gov  · Community-Based Fall Prevention Programs:  cdc.gov  · National [place] on Aging: jv7fqdx.marian.nih.gov  Contact a health care provider if:  · You fall at home.  · You are afraid of falling at home.  · You feel weak, drowsy, or dizzy at home.  Summary  · People 65 and older are at high risk for falling. However, older people are not the only ones injured in falls. Children and young adults have a higher-than-normal risk, too.  · Talk with your health care provider about your risks for falling and how to lower those risks.  · Taking certain precautions at home can lower your risk for falling.  · If you fall, always tell your health care provider.  This information is not intended to replace advice given to you by your health care provider. Make sure you discuss any questions you have with your health care provider.  Document Released: 08/01/2018 Document Revised: 03/19/2019 Document Reviewed: 08/01/2018  [employer] Patient Education © 2020 [employer]    Shortness of Breath, Adult  Shortness of breath is when a person has trouble breathing enough air or when a person feels like she or he is having trouble breathing in enough air. Shortness of breath could be a sign of a medical problem.  Follow these instructions at home:    · Pay attention to any changes in your symptoms.  · Do not use any products that contain nicotine or tobacco, such as cigarettes, e-cigarettes, and chewing tobacco.  · Do not smoke. Smoking is a common cause of shortness of breath. If you need help quitting, ask your health care provider.  · Avoid things that can irritate your airways, such as:  ? Mold.  ? Dust.  ? Air pollution.  ? Chemical fumes.  ? Things that can cause allergy symptoms (allergens), if you have allergies.  · Keep your living space clean and free of mold and dust.  · Rest as needed. Slowly return to your usual activities.  · Take over-the-counter and prescription medicines only as told by your health care provider. This includes oxygen therapy and inhaled  medicines.  · Keep all follow-up visits as told by your health care provider. This is important.  Contact a health care provider if:  · Your condition does not improve as soon as expected.  · You have a hard time doing your normal activities, even after you rest.  · You have new symptoms.  Get help right away if:  · Your shortness of breath gets worse.  · You have shortness of breath when you are resting.  · You feel light-headed or you faint.  · You have a cough that is not controlled with medicines.  · You cough up blood.  · You have pain with breathing.  · You have pain in your chest, arms, shoulders, or abdomen.  · You have a fever.  · You cannot walk up stairs or exercise the way that you normally do.  These symptoms may represent a serious problem that is an emergency. Do not wait to see if the symptoms will go away. Get medical help right away. Call your local emergency services (911 in the [place] Do not drive yourself to the hospital.  Summary  · Shortness of breath is when a person has trouble breathing enough air. It can be a sign of a medical problem.  · Avoid things that irritate your lungs, such as smoking, pollution, mold, and dust.  · Pay attention to changes in your symptoms and contact your health care provider if you have a hard time completing daily activities because of shortness of breath.  This information is not intended to replace advice given to you by your health care provider. Make sure you discuss any questions you have with your health care provider.  Document Released: 09/12/2002 Document Revised: 05/20/2019 Document Reviewed: 05/20/2019  [employer] Patient Education © 2020 [employer]

## 2021-06-02 PROBLEM — N28.9 RENAL INSUFFICIENCY: Status: ACTIVE | Noted: 2021-06-02

## 2021-06-02 PROBLEM — F32.A DEPRESSION: Status: ACTIVE | Noted: 2021-06-02

## 2021-06-02 LAB
BACTERIA BLD CULT: NORMAL
SIGNIFICANT IND 70042: NORMAL
SITE SITE: NORMAL
SOURCE SOURCE: NORMAL

## 2021-06-03 ENCOUNTER — NURSE TRIAGE (OUTPATIENT)
Dept: HEALTH INFORMATION MANAGEMENT | Facility: OTHER | Age: 81
End: 2021-06-03

## 2021-06-03 NOTE — TELEPHONE ENCOUNTER
"Pt was dc on Tuesday [facility].  Daughter has concern, stated the discharging hospitalist toled her she was going to increase it to 100mcg.  In the dc note and the most recent note, states pt to continue 88mcg.  Daughter will contact PCP to question if pt needs to be on a different dose. PCP messaged    Reason for Disposition  • Nursing judgment    Additional Information  • Negative: Nursing judgment  • Negative: Nursing judgment  • Negative: Nursing judgment  • Negative: Information only question and nurse able to answer    Answer Assessment - Initial Assessment Questions  1. REASON FOR CALL or QUESTION: \"What is your reason for calling today?\" or \"How can I best help you?\" or \"What question do you have that I can help answer?\"      Does pt need to be on an increased dose of levothyroxine?    Protocols used: INFORMATION ONLY CALL - NO TRIAGE-A-OH      "

## 2021-06-14 DIAGNOSIS — E03.9 HYPOTHYROIDISM, UNSPECIFIED TYPE: ICD-10-CM

## 2021-06-25 DIAGNOSIS — R06.02 SHORTNESS OF BREATH: ICD-10-CM

## 2021-06-25 RX ORDER — ALBUTEROL SULFATE 90 UG/1
1 AEROSOL, METERED RESPIRATORY (INHALATION) EVERY 4 HOURS PRN
Qty: 1 EACH | Refills: 1 | Status: SHIPPED | OUTPATIENT
Start: 2021-06-25 | End: 2022-03-25

## 2021-07-01 ENCOUNTER — OFFICE VISIT (OUTPATIENT)
Dept: NEUROLOGY | Facility: MEDICAL CENTER | Age: 81
End: 2021-07-01
Attending: PSYCHIATRY & NEUROLOGY
Payer: MEDICARE

## 2021-07-01 VITALS
OXYGEN SATURATION: 93 % | DIASTOLIC BLOOD PRESSURE: 70 MMHG | HEART RATE: 73 BPM | WEIGHT: 150.79 LBS | TEMPERATURE: 97.5 F | HEIGHT: 61 IN | SYSTOLIC BLOOD PRESSURE: 122 MMHG | RESPIRATION RATE: 16 BRPM | BODY MASS INDEX: 28.47 KG/M2

## 2021-07-01 DIAGNOSIS — F02.818 LATE ONSET ALZHEIMER'S DEMENTIA WITH BEHAVIORAL DISTURBANCE (HCC): Primary | ICD-10-CM

## 2021-07-01 DIAGNOSIS — F03.B0 MODERATE DEMENTIA WITHOUT BEHAVIORAL DISTURBANCE (HCC): ICD-10-CM

## 2021-07-01 DIAGNOSIS — G30.1 LATE ONSET ALZHEIMER'S DEMENTIA WITH BEHAVIORAL DISTURBANCE (HCC): Primary | ICD-10-CM

## 2021-07-01 PROCEDURE — 99215 OFFICE O/P EST HI 40 MIN: CPT | Performed by: PSYCHIATRY & NEUROLOGY

## 2021-07-01 PROCEDURE — 99212 OFFICE O/P EST SF 10 MIN: CPT | Performed by: PSYCHIATRY & NEUROLOGY

## 2021-07-01 PROCEDURE — G2212 PROLONG OUTPT/OFFICE VIS: HCPCS | Performed by: PSYCHIATRY & NEUROLOGY

## 2021-07-01 ASSESSMENT — FIBROSIS 4 INDEX: FIB4 SCORE: 1.78

## 2021-07-01 ASSESSMENT — PATIENT HEALTH QUESTIONNAIRE - PHQ9: CLINICAL INTERPRETATION OF PHQ2 SCORE: 0

## 2021-07-01 NOTE — PROGRESS NOTES
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 She lives alone and has been  about 4 year ago.    When asked directly about her memory and thinking abilities, Kaila does not endorse any problems at all. She denies misplacing or losing objects in the last 6 months.  She rarely reads but prefers to watch TV (Game Shows-> The Price is Right and [employer] Shows)- watching both these genre's nearly every day for years. She will also watch the local news.    Pastora noticed about 6 to 7 months before the  passed away- both repeating herself within 10-20 minutes and also when she was told something she'd ask the question over ago. In a routine phone call, Kaila could easily repeat information 3-5 times within say 30 minutes. For over 2 years, Kaila will have difficulty recognizing a great grand son's name but then would forget the grand son's name question.    In the last 2 years ago, Pastora started to have difficulty monitoring her check book because she would \"mess up\" the check book. The problem seemed to be balancing the books but not writing the checks.    Pastora has had to slow down what she is saying to Kaila for over 4 years or so.    Communication and speech has not clearly declined without any paraphasic errors or neologisms.     Electronics- has been pushing the wrong buttons on [employer] for over 1 year and Pastora has had to help her with this.    Gait-balance has not declined in the last 1-2 years without postural changes, shuffling.    Sleep- averages 9 hours a night most nights without nightmares, REM Sleep Behavioral symptoms, snoring/grunting/gasping.    Aricept (5 mg) a day for 4 years or so.    No REM Sleep Behavioral symptoms admitted to.    No sundowning,getting lost in the house in the last 6 months.    No peng paranoia,delusions, " "agitation or \"good vs bad\" periods or days of alertness or attention in the recent months.      No headaches, ongoing visual disturbances (blurring, double or formed hallucinatory objects) in the recent 3 months or so.    Has not had orthostatic dizziness or peng faintiness events in the last 3 months    No history of seizure(s) or concussion(s) or significant alcohol use.    No history of dysarthria,dysphagia,stroke like events.    Has a very mild and very chronic unsteadiness when walking for which she has been for over 1 year using a roller walker for distance walking.    No history of significant alcohol use in adult life.    Denies feeling or being depressed or wanting to hurt self in or by any way or means in the recent weeks.        Patient Active Problem List    Diagnosis Date Noted   • Renal insufficiency 06/02/2021   • Depression 06/02/2021   • Knee pain, left 05/28/2021   • Right ankle pain 05/27/2021   • Dyspnea 09/29/2020   • Atypical nevus of neck 08/18/2020   • White matter abnormality on MRI of brain 01/23/2020   • Shaking 01/23/2020   • Double vision with both eyes open 01/23/2020   • Drooping eyelid, right 11/01/2019   • Influenza vaccination declined 11/01/2019   • Anxiety 10/22/2019   • Fall 10/22/2019   • Mild cognitive impairment 01/03/2019   • Cardiac pacemaker in situ 06/21/2018   • Carotid artery aneurysm (HCC) 01/26/2017   • Complete heart block (HCC) 01/26/2017   • Hypokalemia 10/07/2016   • S/P cardiac catheterization, minimal coronary artery disease April 2016    • Osteopenia 02/13/2013   • Hypothyroidism 02/13/2013   • Hyperlipidemia with target LDL less than 100 07/11/2011       Past medical history:   Past Medical History:   Diagnosis Date   • Cancer (HCC) 2013    basal cell head   • CATARACT     bilat removed   • Cerebral aneurysm    • Cholesterol blood decreased    • Dental disorder     front upper veneers   • High cholesterol    • Hyperlipidemia    • Hypothyroidism 2/13/2013   • " OSTEOPOROSIS 2/13/2013   • Pacemaker     Dr. AKILAH Mar   • Pain 12/05/2019    right eye, 2/10   • S/P cardiac catheterization    • Short-term memory loss    • Thyroid disease        Past surgical history:   Past Surgical History:   Procedure Laterality Date   • RECOVERY  3/21/2016    Procedure: CATH LAB [facility] WITH POSSIBLE WIEDENBECK ;  Surgeon: Recovery[facility] Surgery;  Location: SURGERY PRE-POST PROC UNIT [facility] [place];  Service:    • SHOULDER SURGERY Left 2016   • RECOVERY  7/24/2014    Performed by [facility] at SURGERY SAME DAY [facility]S   • RECOVERY  10/16/2013    Performed by [facility] at SURGERY [facility]S   • SHOULDER SURGERY Right 1998   • GYN SURGERY  1982    hysterectomy   • OTHER Bilateral     cataract removal   • OTHER NEUROLOGICAL SURG      cerebral aneurysm   • TONSILLECTOMY           Social history:   Social History     Socioeconomic History   • Marital status:      Spouse name: Not on file   • Number of children: Not on file   • Years of education: Not on file   • Highest education level: Not on file   Occupational History   • Not on file   Tobacco Use   • Smoking status: Never Smoker   • Smokeless tobacco: Never Used   Vaping Use   • Vaping Use: Never used   Substance and Sexual Activity   • Alcohol use: No     Alcohol/week: 0.0 oz   • Drug use: No   • Sexual activity: Not on file     Comment: , 4 daughters, 13 grands, 6 great-grands   Other Topics Concern   • Not on file   Social History Narrative   • Not on file     Social Determinants of Health     Financial Resource Strain:    • Difficulty of Paying Living Expenses:    Food Insecurity:    • Worried About Running Out of Food in the Last Year:    • Ran Out of Food in the Last Year:    Transportation Needs:    • Lack of Transportation (Medical):    • Lack of Transportation (Non-Medical):    Physical Activity:    • Days of Exercise per Week:    • Minutes of Exercise per Session:    Stress:    • Feeling of Stress :     Social Connections:    • Frequency of Communication with Friends and Family:    • Frequency of Social Gatherings with Friends and Family:    • Attends Gnosticism Services:    • Active Member of Clubs or Organizations:    • Attends Club or Organization Meetings:    • Marital Status:    Intimate Partner Violence:    • Fear of Current or Ex-Partner:    • Emotionally Abused:    • Physically Abused:    • Sexually Abused:        Family history:   Family History   Problem Relation Age of Onset   • Heart Disease Mother    • Heart Disease Father          Current medications:   Current Outpatient Medications   Medication   • donepezil (ARICEPT) 10 MG tablet   • cholestyramine (QUESTRAN) 4 g packet   • levothyroxine (SYNTHROID) 88 MCG Tab   • escitalopram (LEXAPRO) 20 MG tablet   • atorvastatin (LIPITOR) 40 MG Tab   • Calcium Carbonate-Vitamin D (CALCIUM 600/VITAMIN D PO)   • aspirin (ASA) 81 MG CHEW   • albuterol 108 (90 Base) MCG/ACT Aero Soln inhalation aerosol   • lidocaine (LIDODERM) 5 % Patch     No current facility-administered medications for this visit.       Medication Allergy:  Allergies   Allergen Reactions   • Sulfa Drugs Rash and Swelling     Anasarca-especially face   • Latex Rash and Itching   • Tape Rash     Latex tape  (paper tape OK)           ROS:  (In the last 2-4 weeks unless otherwise stated for an additional period of time).    Constitutional: Denies fevers,chills,sweats,involuntary and unprovoked/progressive  weight loss.  Eyes: Denies changes in vision (blurring,double or loss of) nor any eye pain(s)- static,evolving or with eye movements.  Ears/Nose/Throat/Mouth: Denies nasal congestion,sore throat,ear pain(s) or changes in hearing ability.  No tinnitus.  Cardiovascular: Denies chest pain/pressure at rest or with exertion such as climbing stairs or walking. No  palpitations. There has been no  orthostatic lightheadedness/faintness events.  Respiratory: Denies SOB with exertion or when sleeping  "(while laying down).  Gastrointestinal/Hepatic: Denies abdominal pain, nausea, vomiting, diarrhea, constipation or GI bleeding   Genitourinary: Denies bladder dysfunction, dysuria or frequency   Musculoskeletal/Rheum: Denies joint pain and swelling   Skin/Breast: Denies rash, denies breast lumps or discharge   Neurological: Denies new or evolving headaches, new or evolving confusion/disorientation, seizure like events, or focal weakness/parasthesias.  There has been no falls or near falls.  Psychiatric: Denies feeling anxious,depressed,suicidal or having auditory/visual hallucinations.  Endocrine: Denies hx of diabetes or thyroid dysfunction   Heme/Oncology/ Denies easy or spontaneous bruising/bleeding from nose,skin  or a known bleeding disorder   Allergic/Immunologic: Denies hx of allergies         Physical examination:   Vitals:    07/01/21 1609   BP: 122/70   BP Location: Right arm   Patient Position: Sitting   BP Cuff Size: Adult   Pulse: 73   Resp: 16   Temp: 36.4 °C (97.5 °F)   TempSrc: Temporal   SpO2: 93%   Weight: 68.4 kg (150 lb 12.7 oz)   Height: 1.549 m (5' 1\")       Normal Cephalic Atraumatic.  General: Full Range of Movement around the Neck in all directions without restrictions or discrete pain evoked triggers.  No lower extremity edema.      Neurological  Exam:      [place] Cognitive Assessment (MOCA) Version 7.1    Years of Education: High school Graduate.    (To be scanned into the Media Section)    TOTAL SCORE: 15/30          Mental status: Awake, alert and fully oriented to person, place and situation. Normal attention, concentration and fund of knowledge for education level.  Did not appear/act combative,irritable,anxious,paranoid/delusional or aggressive to or with me.    Speech and language: Speech is fluent without errors, clear, intact to repetition and intact to naming.     Follows 3 step motor commands in sequence without significant delay and correctly.    Cranial nerve exam:  II: " Pupils are equally round and reactive to light. Visual fields are intact by confrontation.  III, IV, VI: EOMI, no diplopia, no ptosis.  V: Sensation to light touch is normal over V1-3 distributions bilaterally.  .  VII: Facial movements are symmetrical. There is no facial droop. .  VIII: Hearing intact to soft speech and finger rub bilaterally  IX: Palate elevates symmetrically, uvula is midline. Dysarthria is not present.  XI: Shoulder shrug are symmetrical and strong.   XII: Tongue protrudes midline.      Motor exam:  Muscle tone is normal in all 4 limbs. and No abnormal movements appreciated.    Muscle strength:    Neck Flexors/Extensors: 5/5       Right  Left  Deltoid   5/5  5/5      Biceps   5/5  5/5  Triceps  5/5  5/5   Wrist extensors 5/5  5/5  Wrist flexors  5/5  5/5     5/5  5/5  Interossei  5/5  5/5  Thenar (APB)  5/5  5/5   Hip flexors  5/5  5/5  Quadriceps  5/5  5/5    Hamstrings  5/5  5/5  Dorsiflexors  5/5  5/5  Plantarflexors  5/5  5/5  Toe extension  5/5  5/5  NT = not tested    Sensory exam:  Intact to Vibration and Proprioception in bilaterally lower extremity.    Reflexes:       Right  Left  Biceps   2/4  2/4  Triceps  2/4  2/4  Brachioradialis 2/4  2/4  Knee jerk  2/4  2/4  Ankle jerk  1/4  1/4     Frontal release signs are absent.    bilaterally toes are downgoing to plantar stimulation..    Coordination (finger-to-nose, heel/knee/shin, rapid alternating movements) was normal.     There was no ataxia, no tremors, and no dysmetria.     Station and gait  -easily stands up from exam chair without retropulsion,veering,leaning,swaying (to either side).       Labs and Tests:  Reviewed from last 2 years (most recent B12 level over 500 in 2019)     NEUROIMAGING:     IMPRESSION:        1.  Age-related cerebral atrophy.     2.  Mild to moderate periventricular white matter changes consistent with chronic microvascular ischemic gliosis.     3.  No evidence of acute infarction in the brain  parenchyma.     4.  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      IMPRESSION:     1.  There is minimal residual flow at the neck of the previously treated right internal carotid artery aneurysm. This is stable since the previous study.  2.  An approximately 3 x 4 mm sized aneurysm at the left internal carotid artery at its distal cavernous segment. This is unchanged since the previous angiogram dated 10/16/2013.         Last Resulted: 12/17/19 10:54 AM          Impression and Plans:    1. Mild to Moderate Stage of Dementia (neurodegenerative)- probable Alz type Dementia.    The pattern of deficits and very gradual changes over many years with significant short term memory disturbances supports Alz type Dementia (rather than FTD, Lewy Body or other form/type).    MOCA score of 15/30 today.    Global Deterioration Score of 4 to 5 range including frequently being disoriented to her grand children's names and constantly disoriented to dates and times. Daughter did not feel it was safe for Kaila to travel unassisted at this point in her Dementia condition.    Prior Brain Imaging reviewed from 2016 and 2019 results reviewed.    There are no present or evolving features of a wide based-shuffling or magnetic gait or narrow based Parkinsonian stance.    At this point do not feel that another brain imaging test at this time is necessary, we even reviewed consideration of a Brain PET scan but the daughter did not want to put her through that testing (and Pastora is the DPOA)    Dementia  is a syndrome but statistically and for the majority of patients  occurs due  to a more rapid aging of the brain tissue or potentially from injury to certain parts of one's brain ( often from such contributing factors as  the cumulative effects of alcohol, from one or more ischemic or hemmorhagic stroke(s), from neurodegeneration or long standing with/without suboptimally controlled  "Hypertension). It is for some of these potential factors as to why I recommend a brain imaging test (Head CT or Brain MRI) be done for the 1st time or in certain circumstances repeated for comparison purposes  as such imaging can suggest one or more factors as to the reason(s) for the person's cognitive/memory changes. In fact, a normal or \"age related\" finding on a brain imaging test in and of itself is useful clinical and objective information to have in the evaluation of a person who has either an acute, evolving or even chronic (months to years) long cognitive/memory complaint.    Additional factors or contributors to Brain Health issues can be summarized in several books/references which I discussed as well today.     I briefly reviewed the below information with Pastora that   I am keeping up with editorials and  comments from  many academic neurologists throughout the [place] who are writing reviews and summaries of the present state of this provisionally approved anti amyloid compound (aducanumab)    The FDA's Central and Peripheral Nervous System Advisory Committee voted 10-1 against the compound (the 1 person voted “uncertain”) in that the data did not confirm or support meaningful clinical benefit.      I have read that several senior research neurologists have even commented the compound did nothing clinically meaningful or useful and moreover there was no  clear evidence it prevented the clinical deterioration  of the patients' condition despite potentially removing some amyloid from their brain(s)  tissue.      Based on the critique of the data so far,  there was no  clear scientific evidence this anti amyloid intravenous compound reduced or halted the underlying disease or slowed down the inherent clinical deterioration expected with the Alzheimer's type of Dementia. The clinical data did also not suggest that activities of daily living were improved upon with this compound.    I have discussed with the " patient and family today that given  the great controversy about the study's data and analysis of the lack of clinical  efficacy to this point in time, I feel that I need to wait and see reasonable post marketing data and/or more robust efficacy data supported by the academic community and/or the American Academy of Neurology  before making a commitment to prescribe such a compound and  where there is more than  a minor amount of risk to the patient involved in being administered this compound on a chronic (monthly) basis.    Goals going forwards include:      A. Encouraging some form of exercise preferable aerobic forms to be done (4 to 5 days per week- 30 minutes minimum per day)> 150 minutes per week as a goal. Example activities could include fast walking (up a slight incline), jogging, cycling (road or stationary), swimming,tennis. Essentially, even basic walking on a flat surface or a treadmill would be better than doing nothing.    B. Maintaining or forming new social contacts with family and friends  and a positive attitude despite the concerns and/or ongoing issues with thinking and/or memory.    C. Eating well which means a diet low in salt  (under 2 grams per day), sugar and saturated fat> consideration of the Mediterranean or MIND Diets    D. Consideration of the use of Namenda (handout given to daughter about this medication). Will STOP Aricept at this time.    E. Alz Association pamphlet given to daughter.    F. Follow up at this point PRN        I have performed  a history and physical exam and a directed /focused  ROS today.    Total time spent today or this patient's care was 70  minutes   and included reviewing diagnostic workup to date (labs and imaging that include interpreting such tests relevant to this patient's neurological condition),  reviewing/obtaining separately obtained history (from patient and/or accompanying daughter   for today's neurological problem and  counseling and educating  Kaila and her daughter and  documenting clinical information in the EMR.      Brian John MD  [facility].   [facility]

## 2021-07-10 DIAGNOSIS — E03.4 HYPOTHYROIDISM DUE TO ACQUIRED ATROPHY OF THYROID: ICD-10-CM

## 2021-07-11 RX ORDER — LEVOTHYROXINE SODIUM 88 UG/1
TABLET ORAL
Qty: 90 TABLET | Refills: 0 | Status: SHIPPED | OUTPATIENT
Start: 2021-07-11 | End: 2021-09-21

## 2021-11-11 DIAGNOSIS — H91.93 HEARING DIFFICULTY OF BOTH EARS: ICD-10-CM

## 2022-03-02 PROBLEM — M18.11 OSTEOARTHRITIS OF THUMB, RIGHT: Status: ACTIVE | Noted: 2022-03-02

## 2022-03-02 PROBLEM — G56.01 RIGHT CARPAL TUNNEL SYNDROME: Status: ACTIVE | Noted: 2022-03-02

## 2022-03-11 ENCOUNTER — PRE-ADMISSION TESTING (OUTPATIENT)
Dept: ADMISSIONS | Facility: MEDICAL CENTER | Age: 82
End: 2022-03-11
Attending: ORTHOPAEDIC SURGERY
Payer: MEDICARE

## 2022-03-11 DIAGNOSIS — Z01.810 PRE-OPERATIVE CARDIOVASCULAR EXAMINATION: ICD-10-CM

## 2022-03-11 DIAGNOSIS — Z01.812 PRE-OPERATIVE LABORATORY EXAMINATION: ICD-10-CM

## 2022-03-11 LAB
ANION GAP SERPL CALC-SCNC: 14 MMOL/L (ref 7–16)
BUN SERPL-MCNC: 17 MG/DL (ref 8–22)
CALCIUM SERPL-MCNC: 9.6 MG/DL (ref 8.5–10.5)
CHLORIDE SERPL-SCNC: 102 MMOL/L (ref 96–112)
CO2 SERPL-SCNC: 26 MMOL/L (ref 20–33)
CREAT SERPL-MCNC: 0.84 MG/DL (ref 0.5–1.4)
EKG IMPRESSION: NORMAL
GLUCOSE SERPL-MCNC: 97 MG/DL (ref 65–99)
POTASSIUM SERPL-SCNC: 3.2 MMOL/L (ref 3.6–5.5)
SODIUM SERPL-SCNC: 142 MMOL/L (ref 135–145)

## 2022-03-11 PROCEDURE — 93010 ELECTROCARDIOGRAM REPORT: CPT | Performed by: INTERNAL MEDICINE

## 2022-03-11 PROCEDURE — 36415 COLL VENOUS BLD VENIPUNCTURE: CPT

## 2022-03-11 PROCEDURE — U0003 INFECTIOUS AGENT DETECTION BY NUCLEIC ACID (DNA OR RNA); SEVERE ACUTE RESPIRATORY SYNDROME CORONAVIRUS 2 (SARS-COV-2) (CORONAVIRUS DISEASE [COVID-19]), AMPLIFIED PROBE TECHNIQUE, MAKING USE OF HIGH THROUGHPUT TECHNOLOGIES AS DESCRIBED BY CMS-2020-01-R: HCPCS

## 2022-03-11 PROCEDURE — 93005 ELECTROCARDIOGRAM TRACING: CPT

## 2022-03-11 PROCEDURE — U0005 INFEC AGEN DETEC AMPLI PROBE: HCPCS

## 2022-03-11 PROCEDURE — C9803 HOPD COVID-19 SPEC COLLECT: HCPCS

## 2022-03-11 PROCEDURE — 80048 BASIC METABOLIC PNL TOTAL CA: CPT

## 2022-03-12 LAB
SARS-COV-2 RNA RESP QL NAA+PROBE: NOTDETECTED
SPECIMEN SOURCE: NORMAL

## 2022-03-17 ENCOUNTER — ANESTHESIA (OUTPATIENT)
Dept: SURGERY | Facility: MEDICAL CENTER | Age: 82
End: 2022-03-17
Payer: MEDICARE

## 2022-03-17 ENCOUNTER — ANESTHESIA EVENT (OUTPATIENT)
Dept: SURGERY | Facility: MEDICAL CENTER | Age: 82
End: 2022-03-17
Payer: MEDICARE

## 2022-03-17 ENCOUNTER — HOSPITAL ENCOUNTER (OUTPATIENT)
Facility: MEDICAL CENTER | Age: 82
End: 2022-03-17
Attending: ORTHOPAEDIC SURGERY | Admitting: ORTHOPAEDIC SURGERY
Payer: MEDICARE

## 2022-03-17 VITALS
HEART RATE: 91 BPM | DIASTOLIC BLOOD PRESSURE: 57 MMHG | SYSTOLIC BLOOD PRESSURE: 103 MMHG | WEIGHT: 150.79 LBS | BODY MASS INDEX: 28.47 KG/M2 | RESPIRATION RATE: 20 BRPM | HEIGHT: 61 IN | TEMPERATURE: 97.2 F | OXYGEN SATURATION: 93 %

## 2022-03-17 DIAGNOSIS — G56.01 RIGHT CARPAL TUNNEL SYNDROME: ICD-10-CM

## 2022-03-17 DIAGNOSIS — M18.11 OSTEOARTHRITIS OF THUMB, RIGHT: ICD-10-CM

## 2022-03-17 PROCEDURE — 160002 HCHG RECOVERY MINUTES (STAT): Performed by: ORTHOPAEDIC SURGERY

## 2022-03-17 PROCEDURE — 700111 HCHG RX REV CODE 636 W/ 250 OVERRIDE (IP): Performed by: ANESTHESIOLOGY

## 2022-03-17 PROCEDURE — 160035 HCHG PACU - 1ST 60 MINS PHASE I: Performed by: ORTHOPAEDIC SURGERY

## 2022-03-17 PROCEDURE — 64721 CARPAL TUNNEL SURGERY: CPT | Mod: RT | Performed by: ORTHOPAEDIC SURGERY

## 2022-03-17 PROCEDURE — 501838 HCHG SUTURE GENERAL: Performed by: ORTHOPAEDIC SURGERY

## 2022-03-17 PROCEDURE — 700101 HCHG RX REV CODE 250

## 2022-03-17 PROCEDURE — 700101 HCHG RX REV CODE 250: Performed by: ORTHOPAEDIC SURGERY

## 2022-03-17 PROCEDURE — 160028 HCHG SURGERY MINUTES - 1ST 30 MINS LEVEL 3: Performed by: ORTHOPAEDIC SURGERY

## 2022-03-17 PROCEDURE — 160009 HCHG ANES TIME/MIN: Performed by: ORTHOPAEDIC SURGERY

## 2022-03-17 PROCEDURE — 700101 HCHG RX REV CODE 250: Performed by: ANESTHESIOLOGY

## 2022-03-17 PROCEDURE — 700105 HCHG RX REV CODE 258: Performed by: ORTHOPAEDIC SURGERY

## 2022-03-17 PROCEDURE — 160039 HCHG SURGERY MINUTES - EA ADDL 1 MIN LEVEL 3: Performed by: ORTHOPAEDIC SURGERY

## 2022-03-17 PROCEDURE — 160048 HCHG OR STATISTICAL LEVEL 1-5: Performed by: ORTHOPAEDIC SURGERY

## 2022-03-17 PROCEDURE — 160025 RECOVERY II MINUTES (STATS): Performed by: ORTHOPAEDIC SURGERY

## 2022-03-17 PROCEDURE — 64772 INCISION OF SPINAL NERVE: CPT | Performed by: ORTHOPAEDIC SURGERY

## 2022-03-17 PROCEDURE — 160046 HCHG PACU - 1ST 60 MINS PHASE II: Performed by: ORTHOPAEDIC SURGERY

## 2022-03-17 RX ORDER — HYDROMORPHONE HYDROCHLORIDE 1 MG/ML
0.4 INJECTION, SOLUTION INTRAMUSCULAR; INTRAVENOUS; SUBCUTANEOUS
Status: DISCONTINUED | OUTPATIENT
Start: 2022-03-17 | End: 2022-03-17 | Stop reason: HOSPADM

## 2022-03-17 RX ORDER — LIDOCAINE HYDROCHLORIDE 10 MG/ML
INJECTION, SOLUTION INFILTRATION; PERINEURAL
Status: COMPLETED
Start: 2022-03-17 | End: 2022-03-17

## 2022-03-17 RX ORDER — BUPIVACAINE HYDROCHLORIDE AND EPINEPHRINE 5; 5 MG/ML; UG/ML
INJECTION, SOLUTION EPIDURAL; INTRACAUDAL; PERINEURAL
Status: DISCONTINUED
Start: 2022-03-17 | End: 2022-03-17 | Stop reason: HOSPADM

## 2022-03-17 RX ORDER — ONDANSETRON 2 MG/ML
4 INJECTION INTRAMUSCULAR; INTRAVENOUS
Status: DISCONTINUED | OUTPATIENT
Start: 2022-03-17 | End: 2022-03-17 | Stop reason: HOSPADM

## 2022-03-17 RX ORDER — HALOPERIDOL 5 MG/ML
1 INJECTION INTRAMUSCULAR
Status: DISCONTINUED | OUTPATIENT
Start: 2022-03-17 | End: 2022-03-17 | Stop reason: HOSPADM

## 2022-03-17 RX ORDER — HYDRALAZINE HYDROCHLORIDE 20 MG/ML
INJECTION INTRAMUSCULAR; INTRAVENOUS PRN
Status: DISCONTINUED | OUTPATIENT
Start: 2022-03-17 | End: 2022-03-17 | Stop reason: SURG

## 2022-03-17 RX ORDER — OXYCODONE HCL 5 MG/5 ML
10 SOLUTION, ORAL ORAL
Status: DISCONTINUED | OUTPATIENT
Start: 2022-03-17 | End: 2022-03-17 | Stop reason: HOSPADM

## 2022-03-17 RX ORDER — HYDRALAZINE HYDROCHLORIDE 20 MG/ML
5 INJECTION INTRAMUSCULAR; INTRAVENOUS
Status: DISCONTINUED | OUTPATIENT
Start: 2022-03-17 | End: 2022-03-17 | Stop reason: HOSPADM

## 2022-03-17 RX ORDER — ONDANSETRON 2 MG/ML
INJECTION INTRAMUSCULAR; INTRAVENOUS PRN
Status: DISCONTINUED | OUTPATIENT
Start: 2022-03-17 | End: 2022-03-17 | Stop reason: SURG

## 2022-03-17 RX ORDER — METOCLOPRAMIDE HYDROCHLORIDE 5 MG/ML
INJECTION INTRAMUSCULAR; INTRAVENOUS PRN
Status: DISCONTINUED | OUTPATIENT
Start: 2022-03-17 | End: 2022-03-17 | Stop reason: SURG

## 2022-03-17 RX ORDER — SODIUM CHLORIDE, SODIUM LACTATE, POTASSIUM CHLORIDE, CALCIUM CHLORIDE 600; 310; 30; 20 MG/100ML; MG/100ML; MG/100ML; MG/100ML
INJECTION, SOLUTION INTRAVENOUS CONTINUOUS
Status: DISCONTINUED | OUTPATIENT
Start: 2022-03-17 | End: 2022-03-17 | Stop reason: HOSPADM

## 2022-03-17 RX ORDER — BUPIVACAINE HYDROCHLORIDE 5 MG/ML
INJECTION, SOLUTION EPIDURAL; INTRACAUDAL
Status: DISCONTINUED
Start: 2022-03-17 | End: 2022-03-17 | Stop reason: HOSPADM

## 2022-03-17 RX ORDER — DEXAMETHASONE SODIUM PHOSPHATE 4 MG/ML
INJECTION, SOLUTION INTRA-ARTICULAR; INTRALESIONAL; INTRAMUSCULAR; INTRAVENOUS; SOFT TISSUE PRN
Status: DISCONTINUED | OUTPATIENT
Start: 2022-03-17 | End: 2022-03-17 | Stop reason: SURG

## 2022-03-17 RX ORDER — LIDOCAINE HYDROCHLORIDE 20 MG/ML
INJECTION, SOLUTION EPIDURAL; INFILTRATION; INTRACAUDAL; PERINEURAL PRN
Status: DISCONTINUED | OUTPATIENT
Start: 2022-03-17 | End: 2022-03-17 | Stop reason: SURG

## 2022-03-17 RX ORDER — BUPIVACAINE HYDROCHLORIDE 5 MG/ML
INJECTION, SOLUTION PERINEURAL
Status: DISCONTINUED | OUTPATIENT
Start: 2022-03-17 | End: 2022-03-17 | Stop reason: HOSPADM

## 2022-03-17 RX ORDER — OXYCODONE HCL 5 MG/5 ML
5 SOLUTION, ORAL ORAL
Status: DISCONTINUED | OUTPATIENT
Start: 2022-03-17 | End: 2022-03-17 | Stop reason: HOSPADM

## 2022-03-17 RX ORDER — HYDROMORPHONE HYDROCHLORIDE 1 MG/ML
0.2 INJECTION, SOLUTION INTRAMUSCULAR; INTRAVENOUS; SUBCUTANEOUS
Status: DISCONTINUED | OUTPATIENT
Start: 2022-03-17 | End: 2022-03-17 | Stop reason: HOSPADM

## 2022-03-17 RX ORDER — CEFAZOLIN SODIUM 1 G/3ML
2 INJECTION, POWDER, FOR SOLUTION INTRAMUSCULAR; INTRAVENOUS ONCE
Status: DISCONTINUED | OUTPATIENT
Start: 2022-03-17 | End: 2022-03-17 | Stop reason: HOSPADM

## 2022-03-17 RX ORDER — LIDOCAINE HYDROCHLORIDE 10 MG/ML
INJECTION, SOLUTION INFILTRATION; PERINEURAL
Status: DISCONTINUED | OUTPATIENT
Start: 2022-03-17 | End: 2022-03-17 | Stop reason: HOSPADM

## 2022-03-17 RX ORDER — HYDROMORPHONE HYDROCHLORIDE 1 MG/ML
0.1 INJECTION, SOLUTION INTRAMUSCULAR; INTRAVENOUS; SUBCUTANEOUS
Status: DISCONTINUED | OUTPATIENT
Start: 2022-03-17 | End: 2022-03-17 | Stop reason: HOSPADM

## 2022-03-17 RX ORDER — ACETAMINOPHEN 500 MG
500-1000 TABLET ORAL EVERY 6 HOURS PRN
COMMUNITY
End: 2022-03-25

## 2022-03-17 RX ORDER — KETOROLAC TROMETHAMINE 30 MG/ML
INJECTION, SOLUTION INTRAMUSCULAR; INTRAVENOUS PRN
Status: DISCONTINUED | OUTPATIENT
Start: 2022-03-17 | End: 2022-03-17 | Stop reason: SURG

## 2022-03-17 RX ORDER — MEPERIDINE HYDROCHLORIDE 25 MG/ML
12.5 INJECTION INTRAMUSCULAR; INTRAVENOUS; SUBCUTANEOUS
Status: DISCONTINUED | OUTPATIENT
Start: 2022-03-17 | End: 2022-03-17 | Stop reason: HOSPADM

## 2022-03-17 RX ORDER — CEFAZOLIN SODIUM 1 G/3ML
INJECTION, POWDER, FOR SOLUTION INTRAMUSCULAR; INTRAVENOUS PRN
Status: DISCONTINUED | OUTPATIENT
Start: 2022-03-17 | End: 2022-03-17 | Stop reason: SURG

## 2022-03-17 RX ADMIN — SODIUM CHLORIDE, POTASSIUM CHLORIDE, SODIUM LACTATE AND CALCIUM CHLORIDE: 600; 310; 30; 20 INJECTION, SOLUTION INTRAVENOUS at 07:11

## 2022-03-17 RX ADMIN — FENTANYL CITRATE 25 MCG: 50 INJECTION, SOLUTION INTRAMUSCULAR; INTRAVENOUS at 08:04

## 2022-03-17 RX ADMIN — ONDANSETRON 4 MG: 2 INJECTION INTRAMUSCULAR; INTRAVENOUS at 08:22

## 2022-03-17 RX ADMIN — HYDRALAZINE HYDROCHLORIDE 5 MG: 20 INJECTION INTRAMUSCULAR; INTRAVENOUS at 08:19

## 2022-03-17 RX ADMIN — FENTANYL CITRATE 25 MCG: 50 INJECTION, SOLUTION INTRAMUSCULAR; INTRAVENOUS at 08:22

## 2022-03-17 RX ADMIN — HYDRALAZINE HYDROCHLORIDE 5 MG: 20 INJECTION INTRAMUSCULAR; INTRAVENOUS at 08:26

## 2022-03-17 RX ADMIN — PROPOFOL 150 MG: 10 INJECTION, EMULSION INTRAVENOUS at 07:39

## 2022-03-17 RX ADMIN — FENTANYL CITRATE 25 MCG: 50 INJECTION, SOLUTION INTRAMUSCULAR; INTRAVENOUS at 08:15

## 2022-03-17 RX ADMIN — Medication 0.5 ML: at 06:31

## 2022-03-17 RX ADMIN — SODIUM CHLORIDE, POTASSIUM CHLORIDE, SODIUM LACTATE AND CALCIUM CHLORIDE: 600; 310; 30; 20 INJECTION, SOLUTION INTRAVENOUS at 07:37

## 2022-03-17 RX ADMIN — LIDOCAINE HYDROCHLORIDE 100 MG: 20 INJECTION, SOLUTION EPIDURAL; INFILTRATION; INTRACAUDAL at 07:39

## 2022-03-17 RX ADMIN — LIDOCAINE HYDROCHLORIDE 0.5 ML: 10 INJECTION, SOLUTION INFILTRATION; PERINEURAL at 06:31

## 2022-03-17 RX ADMIN — METOCLOPRAMIDE 10 MG: 5 INJECTION, SOLUTION INTRAMUSCULAR; INTRAVENOUS at 08:22

## 2022-03-17 RX ADMIN — CEFAZOLIN 2 G: 330 INJECTION, POWDER, FOR SOLUTION INTRAMUSCULAR; INTRAVENOUS at 07:39

## 2022-03-17 RX ADMIN — KETOROLAC TROMETHAMINE 15 MG: 30 INJECTION, SOLUTION INTRAMUSCULAR at 08:22

## 2022-03-17 RX ADMIN — FENTANYL CITRATE 25 MCG: 50 INJECTION, SOLUTION INTRAMUSCULAR; INTRAVENOUS at 07:58

## 2022-03-17 RX ADMIN — DEXAMETHASONE SODIUM PHOSPHATE 4 MG: 4 INJECTION, SOLUTION INTRA-ARTICULAR; INTRALESIONAL; INTRAMUSCULAR; INTRAVENOUS; SOFT TISSUE at 07:39

## 2022-03-17 ASSESSMENT — FIBROSIS 4 INDEX
FIB4 SCORE: 1.78
FIB4 SCORE: 1.78

## 2022-03-17 ASSESSMENT — PAIN DESCRIPTION - PAIN TYPE
TYPE: SURGICAL PAIN
TYPE: CHRONIC PAIN
TYPE: SURGICAL PAIN
TYPE: SURGICAL PAIN

## 2022-03-17 NOTE — ANESTHESIA POSTPROCEDURE EVALUATION
Patient: Kaila Choudhary    Procedure Summary     Date: 03/17/22 Room / Location: [facility] ROOM 21 / SURGERY SAME DAY [facility]    Anesthesia Start: 0737 Anesthesia Stop: 0838    Procedures:       RIGHT OPEN CARPAL TUNNEL RELEASE, WRIST, THUMB AND WRIST DENERVATION (Right Wrist)      NEURECTOMY (Wrist) Diagnosis:       Right carpal tunnel syndrome      Osteoarthritis of thumb, right      (Right carpal tunnel syndrome [G56.01]Osteoarthritis of wrist and thumb, right [M18.11])    Surgeons: Luiza Solorzano M.D. Responsible Provider: Broderick Esparza M.D.    Anesthesia Type: general ASA Status: 3          Final Anesthesia Type: general  Last vitals  BP   Blood Pressure : 103/57    Temp   36.2 °C (97.2 °F)    Pulse   91   Resp   20    SpO2   93 %      Anesthesia Post Evaluation    Patient location during evaluation: PACU  Patient participation: complete - patient participated  Level of consciousness: awake and alert    Airway patency: patent  Anesthetic complications: no  Cardiovascular status: hemodynamically stable  Respiratory status: acceptable  Hydration status: euvolemic    PONV: none          No complications documented.     Nurse Pain Score: 0 (NPRS)    No intervention required per [facility] rep.

## 2022-03-17 NOTE — DISCHARGE INSTRUCTIONS
ACTIVITY: Rest and take it easy for the first 24 hours.  A responsible adult is recommended to remain with you during that time.  It is normal to feel sleepy.  We encourage you to not do anything that requires balance, judgment or coordination.    MILD FLU-LIKE SYMPTOMS ARE NORMAL. YOU MAY EXPERIENCE GENERALIZED MUSCLE ACHES, THROAT IRRITATION, HEADACHE AND/OR SOME NAUSEA.    FOR 24 HOURS DO NOT:  Drive, operate machinery or run household appliances.  Drink beer or alcoholic beverages.   Make important decisions or sign legal documents.    SPECIAL INSTRUCTIONS: see handout    DIET: To avoid nausea, slowly advance diet as tolerated, avoiding spicy or greasy foods for the first day.  Add more substantial food to your diet according to your physician's instructions.  Babies can be fed formula or breast milk as soon as they are hungry.  INCREASE FLUIDS AND FIBER TO AVOID CONSTIPATION.    SURGICAL DRESSING/BATHING: see handout    FOLLOW-UP APPOINTMENT:  A follow-up appointment should be arranged with your doctor in; call to schedule.    You should CALL YOUR PHYSICIAN if you develop:  Fever greater than 101 degrees F.  Pain not relieved by medication, or persistent nausea or vomiting.  Excessive bleeding (blood soaking through dressing) or unexpected drainage from the wound.  Extreme redness or swelling around the incision site, drainage of pus or foul smelling drainage.  Inability to urinate or empty your bladder within 8 hours.  Problems with breathing or chest pain.    You should call 911 if you develop problems with breathing or chest pain.  If you are unable to contact your doctor or surgical center, you should go to the nearest emergency room or urgent care center.  Physician's telephone #: [phone]    If any questions arise, call your doctor.  If your doctor is not available, please feel free to call the Surgical Center at [phone].     A registered nurse may call you a few days after your surgery to  see how you are doing after your procedure.    MEDICATIONS: Resume taking daily medication.  Take prescribed pain medication with food.  If no medication is prescribed, you may take non-aspirin pain medication if needed.  PAIN MEDICATION CAN BE VERY CONSTIPATING.  Take a stool softener or laxative such as senokot, pericolace, or milk of magnesia if needed.     Last pain medication given toradol (like ibuprofen) at 0838.    If your physician has prescribed pain medication that includes Acetaminophen (Tylenol), do not take additional Acetaminophen (Tylenol) while taking the prescribed medication.    Depression / Suicide Risk    As you are discharged from this [facility] facility, it is important to learn how to keep safe from harming yourself.    Recognize the warning signs:  · Abrupt changes in personality, positive or negative- including increase in energy   · Giving away possessions  · Change in eating patterns- significant weight changes-  positive or negative  · Change in sleeping patterns- unable to sleep or sleeping all the time   · Unwillingness or inability to communicate  · Depression  · Unusual sadness, discouragement and loneliness  · Talk of wanting to die  · Neglect of personal appearance   · Rebelliousness- reckless behavior  · Withdrawal from people/activities they love  · Confusion- inability to concentrate     If you or a loved one observes any of these behaviors or has concerns about self-harm, here's what you can do:  · Talk about it- your feelings and reasons for harming yourself  · Remove any means that you might use to hurt yourself (examples: pills, rope, extension cords, firearm)  · Get professional help from the community (Mental Health, Substance Abuse, psychological counseling)  · Do not be alone:Call your Safe Contact- someone whom you trust who will be there for you.  · Call your local CRISIS HOTLINE 308-9796 or 216-467-9670  · Call your local Children's Mobile Crisis Response Team  [facility] (721) 683-9032 or www.[employer].PicApp  · Call the toll free National Suicide Prevention Hotlines   · National Suicide Prevention Lifeline 908-104-FAUZ (0847)  · National Hope Line Network 800-SUICIDE (889-7746)

## 2022-03-17 NOTE — ANESTHESIA TIME REPORT
Anesthesia Start and Stop Event Times     Date Time Event    3/17/2022 0709 Ready for Procedure     0737 Anesthesia Start     0838 Anesthesia Stop        Responsible Staff  03/17/22    Name Role Begin End    Broderick Esparza M.D. Anesth 0737 0838        Preop Diagnosis (Free Text):  Pre-op Diagnosis     Right carpal tunnel syndrome [G56.01]Osteoarthritis of thumb and wrist, right [M18.11]        Preop Diagnosis (Codes):  Diagnosis Information     Diagnosis Code(s): Right carpal tunnel syndrome [G56.01]     Osteoarthritis of thumb, right [M18.11]        Premium Reason  Non-Premium    Comments:

## 2022-03-17 NOTE — ANESTHESIA PREPROCEDURE EVALUATION
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          Relevant Problems   PULMONARY   (positive) Dyspnea      CARDIAC   (positive) Cardiac pacemaker in situ   (positive) Carotid artery aneurysm (HCC)   (positive) Complete heart block (HCC)         (positive) Renal insufficiency      ENDO   (positive) Hypothyroidism      Other   (positive) Drooping eyelid, right   (positive) Hyperlipidemia with target LDL less than 100   (positive) Mild cognitive impairment   (positive) Right carpal tunnel syndrome   (positive) S/P cardiac catheterization, minimal coronary artery disease April 2016       Physical Exam    Airway   Mallampati: II  TM distance: >3 FB  Neck ROM: full       Cardiovascular - normal exam  Rhythm: regular  Rate: normal  (-) murmur     Dental   (+) upper dentures           Pulmonary - normal exam  Breath sounds clear to auscultation     Abdominal    Neurological - normal exam                 Anesthesia Plan    ASA 3   ASA physical status 3 criteria: implanted pacemaker    Plan - general       Airway plan will be LMA          Induction: intravenous    Postoperative Plan: Postoperative administration of opioids is intended.    Pertinent diagnostic labs and testing reviewed    Informed Consent:    Anesthetic plan and risks discussed with patient and legal guardian.

## 2022-03-17 NOTE — DISCHARGE INSTR - OTHER INFO
DR. SOLORZANO'S POST-OPERATIVE INSTRUCTIONS    You have just undergone a surgery by Dr. Solorzano in the operating room.  It is our wish that your postoperative recovery be as quick and comfortable as possible.  Please carefully review the following items that are important for your recovery.    After any operation, a certain degree of pain is to be expected. Take Advil (ibuprofen) and Extra Strength Tylenol as first line medications for mild to moderate pain. Taking each one every 6 hours, and staggering them so that you are taking one medicine every 3 hours, is the most effective. Refer to dosing instructions on the bottle, but in general ibuprofen dose is 600-800mg and Tylenol dose is 500-1000mg. For most small procedures, this should be enough to keep you comfortable. Take these medications until your followup visit. You may have been given a small prescription for stronger pain medicine which will help relieve more severe pain.  Pain medicine may make you drowsy so please keep this in mind.  Do not drive while taking pain medicine.      When you go home, please keep your operated arm elevated at all times (above the level of your heart).  If you do this, your swelling will resolve more quickly and your pain will be improve more quickly as well. You may also place an ice pack over your dressing or splint to help with swelling and pain.    It is also very important to keep your fingers moving after most procedures, unless you had a tendon repair or fracture repair in which case you will be in a splint. Keep all of your fingers moving through a full range of motion to prevent stiffness.    For small hand procedures such as carpal tunnel release, trigger finger release, and cyst excision, the dressing that you have on your extremity should remain on for 3-4 days. It may then be removed, you can wash the incision  gently with soap and water, and keep the incision covered with a band-aid or similar clean dressing. For larger procedures or if you have a splint on, these should remain on until follow up or as specifically instructed. If you feel that your dressing is too tight during the first 3 days after surgery, you may loosen it. It is normal to see minor staining on the dressing after surgery. If there is significant bleeding, you are advised to call the office during regular office hours to have this checked.  Make sure that your dressing is kept dry at all times.  You can take a shower if you cover your arm with a plastic bag. If your dressing gets wet, replace it with sterile dressing that you can obtain from your local drug store.    Please call our office for a follow-up appointment, [phone]. Follow up after surgery is typically 10-14 days, unless you were specifically instructed otherwise. The sutures will be removed and you may be asked to see a hand therapist to optimize your functional result. Each of the hand therapists that you will be referred to have received special training in the care of the hand and upper extremity.    If you have questions regarding your surgery postop that you feel requires attention, please call the office at [phone] during business hours, or [phone] after hours for the answering service. If you feel that you have a surgical emergency postoperatively that requires immediate attention, please call the above numbers or go to the Emergency Department and ask for the Orthopedic Surgeon on call.

## 2022-03-17 NOTE — OP REPORT
OPERATIVE NOTE     DATE OF PROCEDURE: 11/23/2021            PRE-OP DIAGNOSIS: Right wrist arthritis, right thumb CMC arthritis, right carpal tunnel syndrome            POST-OP DIAGNOSIS: same            PROCEDURE: Right total wrist denervation, excision of posterior interosseous nerve, anterior interosseous nerve, radial and ulnar branches from the radial artery, ulnar nerve, medial and lateral antebrachial nerves.  Right thumb CMC denervation with excision of branches from the palmar cutaneous branch of the median nerve, motor branch of the median nerve, motor branch of the ulnar nerve, and branches from the lateral antebrachial cutaneous nerve.  Right carpal tunnel release            SURGEON: Luiza Solorzano M.D. - Primary            ANESTHESIA: General            ESTIMATED BLOOD LOSS: Minimal                   SPECIMENS: None            COMPLICATIONS: none            CONDITION: stable to PACU            OPERATIVE INDICATIONS AND DESCRIPTION OF PROCEDURE: Kaila is a pleasant 81-year-old female who presented to my office sometime ago with right wrist and thumb pain.  She also had numbness in the hand.  Her diagnosis was consistent with severe carpal tunnel syndrome, severe thumb CMC arthritis as well as severe STT arthritis in the wrist.  She had trialed an extensive course of conservative treatment including many cortisone injections and splinting.  She failed conservative management and continued to have severe pain as well as constant numbness.  We discussed bony procedures including thumb CMC arthroplasty, proximal row carpectomy, total wrist fusion.  We also discussed total wrist denervation.  We discussed the surgical techniques, risk, benefits, expected outcomes.  She ultimately decided that she wanted to proceed with right carpal tunnel release, as well as a wrist denervation and thumb CMC joint denervation in an attempt to decrease her pain level.  We discussed risks including, but not limited to,  bleeding, infection, wound healing issues, hematoma, seroma, continued pain, incomplete resolution of pain, dysesthesias, numbness in the hand and around the wrist, weakness, loss of motion, continued mechanical symptoms, need for conversion to a bony procedure, risks of anesthesia.  She elected to proceed.  I saw her in the preoperative holding area, identified her, and marked the right wrist.  She was taken back to the operating room.  General anesthesia was induced by the anesthesia provider.  A tourniquet was placed on the arm and the upper extremity was prepped and draped in usual orthopedic fashion.  A timeout was performed.  The tourniquet was inflated to 200 mmHg.  I began with the carpal tunnel release. A 1 cm longitudinal incision was made in the palm.  I bluntly dissected through the subcutaneous tissues and down to the palmar fascia.  The palmar fascia was longitudinally split.  Deep retractors were placed.  The transverse carpal ligament was identified.  I incised the distal part of the transverse carpal ligament with a 15 blade. I identified the median nerve. I placed the safeguard sled underneath the transverse carpal ligament proximally, and above the median nerve, making sure to protect the nerve at all times.  Once I was happy with the safe positioning of the sled and that the nerve was protected, I slid the safeguard blade proximally along the groove of the sled, transecting the remaining portion of the transverse carpal ligament.  I visually confirmed complete transection of the entire ligament and that there were no further sites of compression on the nerve. The nerve was intact and uninjured throughout the case. I inspected the distal part of the transverse carpal ligament to make sure it was completely transected.  The wound was then thoroughly irrigated with normal saline.  The skin was closed with 4-0 nylon suture.I then proceeded with the wrist denervation portion of the procedure.  I began  by making a 6 cm longitudinal dorsal incision in the central portion of the distal forearm, proximal to the wrist joint.  I elevated skin flaps.  I maintained hemostasis with bipolar cautery.  I incised the interval between the fourth and fifth extensor compartments.  I pulled the tendons to the side and identified the posterior interosseous nerve.  I used bipolar cautery to excise a 3 similar portion of the posterior interosseous nerve.  I then incised the interosseous membrane and I identified the anterior interosseous nerve on the dorsal aspect of the pronator quadratus muscle.  I used bipolar cautery to excise a 3 cm section of the sensory portion of the anterior osseous nerve while preserving the motor portion.  At this point, I went back to the dorsal part of the wrist.  I elevated skin and subcutaneous flap in the radial direction.  I did identify the dorsal sensory branch of the radial nerve.  I used bipolar cautery to sever multiple capsular branches of this nerve going towards the wrist capsule on the radial side.  I performed the same technique heading in an ulnar direction.  I identified several sensory branches of the ulnar nerve going towards the wrist capsule at the distal ulna and I excised this with bipolar cautery.  I then turned my attention to the volar portion of the wrist.  I made a 3 to 4 cm longitudinal incision just radial to the FCR tendon.  I bluntly dissected to the subcutaneous tissues.  I identified the radial artery.  I gently dissected around the radial artery and skeletonized it.  I identified the venae comitantes and cauterize these.  I then connected the volar and dorsal incisions through a radial tunnel to make sure that all radial nerve branches heading towards the capsule had been detached.  At this point I extended the volar incision into a Echeverria incision towards the CMC joint of the thumb.  I identified the radial sensory nerve as well as the lateral antebrachial cutaneous  nerves on this portion of the joint, elevated them gently, and detached several small nerve branches that dove towards the CMC and STT joints.  I then elevated the thenar musculature and used bipolar cautery to detach any small sensory nerve branches from the motor branch of the ulnar nerve.  I also elevated subcutaneously in an ulnar direction and made sure to detach any small branches from the palmar cutaneous branch of the median nerve.  I then made another 3 cm incision on the dorsal aspect of the thumb CMC joint.  I once again elevated the lateral antebrachial cutaneous nerve as well as some terminal nerve branches from the medial antebrachial cutaneous nerve, and made sure that these had no small nerve branches diving towards the STT and CMC joints.  At this point, I was satisfied that I had performed a thorough complete wrist and thumb CMC denervation.  The wounds were thoroughly irrigated with normal saline.  I made sure to maintain meticulous hemostasis with bipolar cautery.  The thenar musculature was repaired with 4-0 Vicryl.  The skin was closed with 4 nylon.  Local anesthetic was infiltrated around the wounds.  Sterile dressings and a volar splint were applied.  Tourniquet was deflated.  She awoke from anesthesia and was transferred to the PACU in stable condition.

## 2022-03-17 NOTE — PROGRESS NOTES
0838-pt arrived to pacu, report from OR. Pt resting comfortably with eyes closed. BP low, anaesthesia at bedside, no orders at this time. Other vss on 6L O2. Pt wakes up easily, denies pain. Dressing to right wrist-clean, dry and intact. Wrist elevated above heart.  0852-BP becoming more stable, pt waking up, denies pain or nausea. Pacemaker rep, Anca contacted with [employer]. No need for interrogation at this time per rep.   0915-pt placed on room air, VSS. Family updated on patient condition.  0939-pt meets criteria for phase 2. Report to PARVIZ March.

## 2022-03-17 NOTE — ANESTHESIA PROCEDURE NOTES
Airway    Date/Time: 3/17/2022 7:40 AM  Performed by: Broderick Esparza M.D.  Authorized by: Broderick Esparza M.D.     Location:  OR  Urgency:  Elective  Indications for Airway Management:  Anesthesia      Spontaneous Ventilation: absent    Sedation Level:  Deep  Preoxygenated: Yes    Final Airway Type:  Supraglottic airway  Final Supraglottic Airway:  Standard LMA    SGA Size:  4  Number of Attempts at Approach:  1

## 2022-03-17 NOTE — PROGRESS NOTES
Patient in pre-op, assessment completed, patient and daughter Pastora updated on plan of care, all questions answered, no further needs at this time, call light within reach.

## 2022-03-17 NOTE — LETTER
March 9, 2022    Patient Name: Kaila Choudhary  Surgeon Name: Luiza Solorzano M.D.  Surgery Facility: [facility] ([address] [place] [address])  Surgery Date: 3/17/2022    The time of your surgery is not final and may change up to and until the day of your surgery. You will be contacted 24-48 hours prior to your surgery date with your check-in and surgery time.    If you will not be at one of the below numbers please call/text the surgery scheduler at [phone]  Preferred Phone: [phone]    BEFORE YOUR SURGERY  Pre Registration and/or Lab Work must be done within and no earlier than 28 days prior to your surgery date. Please call [facility] at [phone] for an appointment as soon as possible.     Instructions: Bring a list of all medications you are taking including the dosing and frequency.    Please refrain from smoking any substance after midnight prior to surgery. Smoking may interfere with the anesthetic and frequently produces nausea during the recovery period.    Continue taking all lifesaving medications. Including the morning of your surgery with small sip of water.    Please read the MEDICATION INSTRUCTIONS below completely.    DAY OF YOUR SURGERY  Nothing to eat or drink after midnight     Please arrive at the hospital/surgery center at the check-in time provided.     An adult will need to bring you and take you home after your surgery.     AFTER YOUR SURGERY  Post op Appointment:   Date: 03/8/22   Time: 345PM   With: Luiza Solorzano M.D.   Location: [address] [place] [address]            TIME OFF WORK  FMLA or Disability forms can be faxed directly to: [phone] or you may drop them off at [facility] [place] [address]. Our office charges a $35.00 fee per form. Forms will be completed within 10 business days of drop off and payment received. For the status of your forms you may contact our disability office directly [phone].    MEDICATION INSTRUCTIONS  The following medications should be stopped a minimum of 10 days prior to surgery:  All over the counter, Supplements & Herbal medications    Anorectics: Phentermine (Adipex-P, Lomaira and Suprenza), Phentermine-topiramate (Qsymia), Bupropion-naltrexone (Contrave)    Opiod Partial Agonists/Opioid Antagonists: Buprenorphine (Subocone, Belbuca, Butrans, Probuphine Implant, Sublocade), Naltrexone (ReVia, Vivitrol), Naloxone    Amphetamines: Dextroamphetamine/Amphetamine (Adderall, Mydayis), Methylphenidate Hydrochloride (Concerta, Metadate, Methylin, Ritalin)    The following medications should be stopped 5 days prior to surgery:  Blood Thinners: Any Aspirin, Aspirin products, anti-inflammatories such as ibuprofen and any blood thinners such as Coumadin and Plavix. Please consult your prescribing physician if you are on life saving blood thinners, in regards to when to stop medications prior to surgery.     The following medications should be stopped a minimum of 3 days prior to surgery:  PDE-5 inhibitors: Sildenafil (Viagra), Tadalafil (Cialis), Vardenafil (Levitra), Avanafil (Stendra)    MAO Inhibitors: Rasagiline (Azilect), Selegiline (Eldepryl, Emsam, Selapar), Isocarboxazid (Marplan), Phenelzine (Nardil)

## 2022-03-18 ENCOUNTER — TELEPHONE (OUTPATIENT)
Dept: MEDICAL GROUP | Facility: MEDICAL CENTER | Age: 82
End: 2022-03-18
Payer: MEDICARE

## 2022-03-18 NOTE — TELEPHONE ENCOUNTER
Phone Number Called: [phone]    Call outcome: Left detailed message for patient. Informed to call back with any additional questions.    Message: Informed pts daughter Pastora that her mother Kaila has not been seen with Azeem for over 1 year. Per the [facility] policy she needs to be scheduled with him soon to get future medication refills.

## 2022-03-25 ENCOUNTER — TELEMEDICINE (OUTPATIENT)
Dept: MEDICAL GROUP | Facility: MEDICAL CENTER | Age: 82
End: 2022-03-25
Payer: MEDICARE

## 2022-03-25 VITALS — BODY MASS INDEX: 28.32 KG/M2 | WEIGHT: 150 LBS | HEIGHT: 61 IN

## 2022-03-25 DIAGNOSIS — Z78.0 ASYMPTOMATIC POSTMENOPAUSAL STATUS: ICD-10-CM

## 2022-03-25 DIAGNOSIS — E03.4 HYPOTHYROIDISM DUE TO ACQUIRED ATROPHY OF THYROID: ICD-10-CM

## 2022-03-25 DIAGNOSIS — I72.0 CAROTID ARTERY ANEURYSM (HCC): ICD-10-CM

## 2022-03-25 DIAGNOSIS — G56.01 RIGHT CARPAL TUNNEL SYNDROME: ICD-10-CM

## 2022-03-25 DIAGNOSIS — Z91.81 RISK FOR FALLS: ICD-10-CM

## 2022-03-25 DIAGNOSIS — E87.6 HYPOKALEMIA: ICD-10-CM

## 2022-03-25 DIAGNOSIS — R06.02 SHORTNESS OF BREATH: ICD-10-CM

## 2022-03-25 DIAGNOSIS — E78.5 HYPERLIPIDEMIA WITH TARGET LDL LESS THAN 100: ICD-10-CM

## 2022-03-25 DIAGNOSIS — R25.1 SHAKING: ICD-10-CM

## 2022-03-25 DIAGNOSIS — I44.2 COMPLETE HEART BLOCK (HCC): ICD-10-CM

## 2022-03-25 PROBLEM — H53.2 DOUBLE VISION WITH BOTH EYES OPEN: Status: RESOLVED | Noted: 2020-01-23 | Resolved: 2022-03-25

## 2022-03-25 PROBLEM — M25.571 RIGHT ANKLE PAIN: Status: RESOLVED | Noted: 2021-05-27 | Resolved: 2022-03-25

## 2022-03-25 PROCEDURE — 99214 OFFICE O/P EST MOD 30 MIN: CPT | Mod: 95 | Performed by: PHYSICIAN ASSISTANT

## 2022-03-25 RX ORDER — ATORVASTATIN CALCIUM 40 MG/1
40 TABLET, FILM COATED ORAL DAILY
Qty: 90 TABLET | Refills: 2 | Status: SHIPPED | OUTPATIENT
Start: 2022-03-25 | End: 2023-04-24 | Stop reason: SDUPTHER

## 2022-03-25 RX ORDER — LEVOTHYROXINE SODIUM 88 UG/1
88 TABLET ORAL
Qty: 90 TABLET | Refills: 2 | Status: SHIPPED | OUTPATIENT
Start: 2022-03-25 | End: 2022-05-10 | Stop reason: SDUPTHER

## 2022-03-25 ASSESSMENT — FIBROSIS 4 INDEX: FIB4 SCORE: 1.78

## 2022-03-25 NOTE — ASSESSMENT & PLAN NOTE
As mentioned above recently had surgery performed by Dr. Solorzano.  Currently wearing a soft cast.  Is doing well.  Has a follow-up appointment on the 28th.

## 2022-03-25 NOTE — PROGRESS NOTES
Subjective:   Kaila Choudhary is a 81 y.o. female here today for history of carotid artery aneurysm, hypothyroidism, hypokalemia and risk for falls.    This evaluation was conducted via Zoom using secure and encrypted videoconferencing technology. The patient was in their home in the [facility].    The patient's identity was confirmed and verbal consent was obtained for this virtual visit.    Carotid artery aneurysm (HCC)  This is a pleasant 81-year-old female a virtual visit today.  Her daughter, Pastora is with her.  She still follows with Dr. Mar at [facility].  Has a history of a carotid arterial aneurysm which was coiled at one point.  She also has a history of complete heart block.  But no concerns recently.  That was in 2016.    Hypothyroidism  Chronic condition.  Takes levothyroxine 88 mcg daily.  Needs a renewal.  Also needs updated labs.    Hypokalemia  Had recent labs performed prior to carpal tunnel surgery.  Potassium level at 3.2.    Right carpal tunnel syndrome  As mentioned above recently had surgery performed by Dr. Solorzano.  Currently wearing a soft cast.  Is doing well.  Has a follow-up appointment on the 28th.    Risk for falls  Last time fell in June of last year.  No recent falls.  Daughter states she does not go out much.  She does feel comfortable at home.  Still has intermittent tremors and shaking.        Current medicines (including changes today)  Current Outpatient Medications   Medication Sig Dispense Refill   • levothyroxine (SYNTHROID) 88 MCG Tab Take 1 Tablet by mouth every morning on an empty stomach. 90 Tablet 2   • atorvastatin (LIPITOR) 40 MG Tab Take 1 Tablet by mouth every day. 90 Tablet 2   • Calcium Carbonate-Vitamin D (CALCIUM 600/VITAMIN D PO) Take  by mouth.     • aspirin (ASA) 81 MG CHEW Take 81 mg by mouth every morning.       No current facility-administered medications for this visit.     She  has a past medical history of Arthritis (03/11/2022), Cancer  "(HCC) (2013), CATARACT, Cerebral aneurysm, Cholesterol blood decreased, Dental disorder, High cholesterol (03/11/2022), Hyperlipidemia, Hypothyroidism (2/13/2013), OSTEOPOROSIS (2/13/2013), Pacemaker, Pain (12/05/2019), S/P cardiac catheterization, Short-term memory loss, and Thyroid disease (03/11/2022).    Social History and Family History were reviewed and updated.    ROS   No chest pain, no shortness of breath, no abdominal pain and all other systems were reviewed and are negative.       Objective:     Ht 1.549 m (5' 1\")   Wt 68 kg (150 lb)  Body mass index is 28.34 kg/m².   Physical Exam:  Constitutional: Alert, no distress.  Skin: Warm, dry, good turgor, no rashes in visible areas.  Eye: Equal, round and reactive, conjunctiva clear, lids normal.  ENMT: Lips without lesions, good dentition, oropharynx clear.  Neck: Trachea midline, no masses.   Lymph: No cervical or supraclavicular lymphadenopathy  Respiratory: Unlabored respiratory effort.  Psych: Alert and oriented x3, normal affect and mood.        Assessment and Plan:   The following treatment plan was discussed    1. Carotid artery aneurysm (HCC)  Chronic condition.  Stable.  Status post coil.  Continue to follow with Dr. Mar at [facility].    2. Shortness of breath  Chronic condition.  Intermittent.  Secondary to activity.  Currently on no medications.  We will continue to monitor.    3. Hypothyroidism due to acquired atrophy of thyroid  Chronic condition.  Status unknown.  Renewed Synthroid.  Ordered labs.  - levothyroxine (SYNTHROID) 88 MCG Tab; Take 1 Tablet by mouth every morning on an empty stomach.  Dispense: 90 Tablet; Refill: 2  - TSH WITH REFLEX TO FT4; Future    4. Hyperlipidemia with target LDL less than 100  Chronic condition.  Status unknown.  Renew Lipitor at 40 mg.  Ordered labs.  Must fast 8 hours.  - atorvastatin (LIPITOR) 40 MG Tab; Take 1 Tablet by mouth every day.  Dispense: 90 Tablet; Refill: 2  - Lipid Profile; Future    5. " Hypokalemia  New condition noted in chart.  Will check CMP.  May perform in 2 to 3 weeks.  - Comp Metabolic Panel; Future  - CBC WITH DIFFERENTIAL; Future    6. Right carpal tunnel syndrome  Chronic condition.  Status post release.  Continue to follow with orthopedics.  Has an appointment on the 28th.    7. Shaking  Chronic, intermittent condition.  Appears to be more of a tremor.  No tremor today.  We will continue to monitor.    8. Risk for falls  Prior fall greater than 8 months ago.  We will continue to monitor.    9. Asymptomatic postmenopausal status  Order DEXA scan.  Contact imaging to possibly make an appointment during her lab draw.  - DS-BONE DENSITY STUDY (DEXA); Future         Followup: Return in about 6 months (around 9/25/2022), or if symptoms worsen or fail to improve.    Please note that this dictation was created using voice recognition software. I have made every reasonable attempt to correct obvious errors, but I expect that there are errors of grammar and possibly content that I did not discover before finalizing the note.

## 2022-03-25 NOTE — ASSESSMENT & PLAN NOTE
This is a pleasant 81-year-old female a virtual visit today.  Her daughter, Pastora is with her.  She still follows with Dr. Mar at [facility].  Has a history of a carotid arterial aneurysm which was coiled at one point.  She also has a history of complete heart block.  But no concerns recently.  That was in 2016.

## 2022-03-25 NOTE — ASSESSMENT & PLAN NOTE
Last time fell in June of last year.  No recent falls.  Daughter states she does not go out much.  She does feel comfortable at home.  Still has intermittent tremors and shaking.

## 2022-05-06 ENCOUNTER — HOSPITAL ENCOUNTER (OUTPATIENT)
Dept: LAB | Facility: MEDICAL CENTER | Age: 82
End: 2022-05-06
Attending: PSYCHIATRY & NEUROLOGY
Payer: MEDICARE

## 2022-05-06 ENCOUNTER — HOSPITAL ENCOUNTER (OUTPATIENT)
Dept: RADIOLOGY | Facility: MEDICAL CENTER | Age: 82
End: 2022-05-06
Attending: PHYSICIAN ASSISTANT
Payer: MEDICARE

## 2022-05-06 ENCOUNTER — HOSPITAL ENCOUNTER (OUTPATIENT)
Dept: LAB | Facility: MEDICAL CENTER | Age: 82
End: 2022-05-06
Attending: PHYSICIAN ASSISTANT
Payer: MEDICARE

## 2022-05-06 DIAGNOSIS — G30.1 LATE ONSET ALZHEIMER'S DEMENTIA WITH BEHAVIORAL DISTURBANCE (HCC): ICD-10-CM

## 2022-05-06 DIAGNOSIS — E03.4 HYPOTHYROIDISM DUE TO ACQUIRED ATROPHY OF THYROID: ICD-10-CM

## 2022-05-06 DIAGNOSIS — E03.9 HYPOTHYROIDISM, UNSPECIFIED TYPE: ICD-10-CM

## 2022-05-06 DIAGNOSIS — F02.818 LATE ONSET ALZHEIMER'S DEMENTIA WITH BEHAVIORAL DISTURBANCE (HCC): ICD-10-CM

## 2022-05-06 DIAGNOSIS — E78.5 HYPERLIPIDEMIA WITH TARGET LDL LESS THAN 100: ICD-10-CM

## 2022-05-06 DIAGNOSIS — E87.6 HYPOKALEMIA: ICD-10-CM

## 2022-05-06 DIAGNOSIS — Z78.0 ASYMPTOMATIC POSTMENOPAUSAL STATUS: ICD-10-CM

## 2022-05-06 LAB
ALBUMIN SERPL BCP-MCNC: 4 G/DL (ref 3.2–4.9)
ALBUMIN/GLOB SERPL: 1.4 G/DL
ALP SERPL-CCNC: 104 U/L (ref 30–99)
ALT SERPL-CCNC: 7 U/L (ref 2–50)
ANION GAP SERPL CALC-SCNC: 15 MMOL/L (ref 7–16)
AST SERPL-CCNC: 14 U/L (ref 12–45)
BASOPHILS # BLD AUTO: 0.9 % (ref 0–1.8)
BASOPHILS # BLD: 0.06 K/UL (ref 0–0.12)
BILIRUB SERPL-MCNC: 0.9 MG/DL (ref 0.1–1.5)
BUN SERPL-MCNC: 16 MG/DL (ref 8–22)
CALCIUM SERPL-MCNC: 9 MG/DL (ref 8.5–10.5)
CHLORIDE SERPL-SCNC: 103 MMOL/L (ref 96–112)
CHOLEST SERPL-MCNC: 137 MG/DL (ref 100–199)
CO2 SERPL-SCNC: 24 MMOL/L (ref 20–33)
CREAT SERPL-MCNC: 0.96 MG/DL (ref 0.5–1.4)
EOSINOPHIL # BLD AUTO: 0.11 K/UL (ref 0–0.51)
EOSINOPHIL NFR BLD: 1.7 % (ref 0–6.9)
ERYTHROCYTE [DISTWIDTH] IN BLOOD BY AUTOMATED COUNT: 46.5 FL (ref 35.9–50)
GFR SERPLBLD CREATININE-BSD FMLA CKD-EPI: 59 ML/MIN/1.73 M 2
GLOBULIN SER CALC-MCNC: 2.8 G/DL (ref 1.9–3.5)
GLUCOSE SERPL-MCNC: 116 MG/DL (ref 65–99)
HCT VFR BLD AUTO: 37.3 % (ref 37–47)
HDLC SERPL-MCNC: 52 MG/DL
HGB BLD-MCNC: 12.3 G/DL (ref 12–16)
IMM GRANULOCYTES # BLD AUTO: 0.03 K/UL (ref 0–0.11)
IMM GRANULOCYTES NFR BLD AUTO: 0.5 % (ref 0–0.9)
LDLC SERPL CALC-MCNC: 71 MG/DL
LYMPHOCYTES # BLD AUTO: 1.65 K/UL (ref 1–4.8)
LYMPHOCYTES NFR BLD: 24.8 % (ref 22–41)
MCH RBC QN AUTO: 30.5 PG (ref 27–33)
MCHC RBC AUTO-ENTMCNC: 33 G/DL (ref 33.6–35)
MCV RBC AUTO: 92.6 FL (ref 81.4–97.8)
MONOCYTES # BLD AUTO: 0.79 K/UL (ref 0–0.85)
MONOCYTES NFR BLD AUTO: 11.9 % (ref 0–13.4)
NEUTROPHILS # BLD AUTO: 4.01 K/UL (ref 2–7.15)
NEUTROPHILS NFR BLD: 60.2 % (ref 44–72)
NRBC # BLD AUTO: 0 K/UL
NRBC BLD-RTO: 0 /100 WBC
PLATELET # BLD AUTO: 221 K/UL (ref 164–446)
PMV BLD AUTO: 12.6 FL (ref 9–12.9)
POTASSIUM SERPL-SCNC: 2.9 MMOL/L (ref 3.6–5.5)
PROT SERPL-MCNC: 6.8 G/DL (ref 6–8.2)
RBC # BLD AUTO: 4.03 M/UL (ref 4.2–5.4)
SODIUM SERPL-SCNC: 142 MMOL/L (ref 135–145)
TRIGL SERPL-MCNC: 68 MG/DL (ref 0–149)
WBC # BLD AUTO: 6.7 K/UL (ref 4.8–10.8)

## 2022-05-06 PROCEDURE — 80053 COMPREHEN METABOLIC PANEL: CPT

## 2022-05-06 PROCEDURE — 83921 ORGANIC ACID SINGLE QUANT: CPT

## 2022-05-06 PROCEDURE — 84425 ASSAY OF VITAMIN B-1: CPT

## 2022-05-06 PROCEDURE — 82746 ASSAY OF FOLIC ACID SERUM: CPT

## 2022-05-06 PROCEDURE — 85025 COMPLETE CBC W/AUTO DIFF WBC: CPT

## 2022-05-06 PROCEDURE — 84439 ASSAY OF FREE THYROXINE: CPT

## 2022-05-06 PROCEDURE — 77080 DXA BONE DENSITY AXIAL: CPT

## 2022-05-06 PROCEDURE — 36415 COLL VENOUS BLD VENIPUNCTURE: CPT

## 2022-05-06 PROCEDURE — 80061 LIPID PANEL: CPT

## 2022-05-06 PROCEDURE — 82607 VITAMIN B-12: CPT

## 2022-05-06 PROCEDURE — 84443 ASSAY THYROID STIM HORMONE: CPT

## 2022-05-07 ENCOUNTER — DOCUMENTATION (OUTPATIENT)
Dept: NEUROLOGY | Facility: MEDICAL CENTER | Age: 82
End: 2022-05-07
Payer: MEDICARE

## 2022-05-07 DIAGNOSIS — E53.8 LOW FOLATE: ICD-10-CM

## 2022-05-07 LAB
FOLATE SERPL-MCNC: 3.6 NG/ML
T4 FREE SERPL-MCNC: 2.02 NG/DL (ref 0.93–1.7)
TSH SERPL DL<=0.005 MIU/L-ACNC: 7.28 UIU/ML (ref 0.38–5.33)
VIT B12 SERPL-MCNC: 283 PG/ML (ref 211–911)

## 2022-05-10 ENCOUNTER — TELEPHONE (OUTPATIENT)
Dept: MEDICAL GROUP | Facility: MEDICAL CENTER | Age: 82
End: 2022-05-10
Payer: MEDICARE

## 2022-05-10 DIAGNOSIS — E87.6 HYPOKALEMIA: ICD-10-CM

## 2022-05-10 DIAGNOSIS — E03.4 HYPOTHYROIDISM DUE TO ACQUIRED ATROPHY OF THYROID: ICD-10-CM

## 2022-05-10 RX ORDER — POTASSIUM CHLORIDE 750 MG/1
10 TABLET, FILM COATED, EXTENDED RELEASE ORAL 2 TIMES DAILY
Qty: 60 TABLET | Refills: 3 | Status: SHIPPED | OUTPATIENT
Start: 2022-05-10 | End: 2022-05-12

## 2022-05-10 RX ORDER — LEVOTHYROXINE SODIUM 0.07 MG/1
75 TABLET ORAL
Qty: 90 TABLET | Refills: 1 | Status: SHIPPED | OUTPATIENT
Start: 2022-05-10 | End: 2022-07-18 | Stop reason: SDUPTHER

## 2022-05-10 NOTE — TELEPHONE ENCOUNTER
----- Message from Azeem Carter P.A.-C. sent at 5/10/2022  9:16 AM PDT -----  Please contact Kaila.  She needs to start a potassium supplement.  I will send it over to her pharmacy locally at [facility] on [address].  I ordered labs to be repeated in 1 week.  She should do the lab nonfasting.  Thank you.    Azeem

## 2022-05-10 NOTE — TELEPHONE ENCOUNTER
Phone Number Called: [phone]    Call outcome: Spoke to patient regarding message below.    Message: spoke to pt's daughter Pastora over Kaila's lab results. Pastora did state that she will be picking up the potassium supplement for her mother tomorrow. Pastora also stated that Kaila will be taking her to do her repeated labs on Friday non fasting.

## 2022-05-11 DIAGNOSIS — E87.6 HYPOKALEMIA: ICD-10-CM

## 2022-05-12 ENCOUNTER — TELEPHONE (OUTPATIENT)
Dept: MEDICAL GROUP | Facility: MEDICAL CENTER | Age: 82
End: 2022-05-12
Payer: MEDICARE

## 2022-05-12 LAB
METHYLMALONATE SERPL-SCNC: 0.26 UMOL/L (ref 0–0.4)
VIT B1 BLD-MCNC: 124 NMOL/L (ref 70–180)

## 2022-05-12 RX ORDER — POTASSIUM CHLORIDE 750 MG/1
TABLET, FILM COATED, EXTENDED RELEASE ORAL
Qty: 180 TABLET | Refills: 2 | Status: SHIPPED | OUTPATIENT
Start: 2022-05-12 | End: 2022-07-18 | Stop reason: SDUPTHER

## 2022-05-12 NOTE — TELEPHONE ENCOUNTER
Received request via: Pharmacy    Was the patient seen in the last year in this department? Yes    Does the patient have an active prescription (recently filled or refills available) for medication(s) requested? No     Pharmacy comment: REQUEST FOR 90 DAYS PRESCRIPTION.   Urology order UA as pt was having UTI sx yesterday-she planned on leaving sample after her appt this am. watching for those results

## 2022-05-12 NOTE — TELEPHONE ENCOUNTER
Phone Number Called: [phone]    Call outcome: Left detailed message for patient. Informed to call back with any additional questions.    Message: Pt's daughter Pastora was left a vm in regards of her question on Kaila's medication refills. I stated that pt's both mediations for her thyroid and potassium have been refilled.

## 2022-05-27 DIAGNOSIS — Z91.81 RISK FOR FALLS: ICD-10-CM

## 2022-05-27 DIAGNOSIS — R25.1 SHAKING: ICD-10-CM

## 2022-07-14 ENCOUNTER — HOSPITAL ENCOUNTER (OUTPATIENT)
Dept: LAB | Facility: MEDICAL CENTER | Age: 82
End: 2022-07-14
Attending: PSYCHIATRY & NEUROLOGY
Payer: MEDICARE

## 2022-07-14 ENCOUNTER — HOSPITAL ENCOUNTER (OUTPATIENT)
Dept: LAB | Facility: MEDICAL CENTER | Age: 82
End: 2022-07-14
Attending: PHYSICIAN ASSISTANT
Payer: MEDICARE

## 2022-07-14 DIAGNOSIS — E53.8 LOW FOLATE: ICD-10-CM

## 2022-07-14 DIAGNOSIS — E03.4 HYPOTHYROIDISM DUE TO ACQUIRED ATROPHY OF THYROID: ICD-10-CM

## 2022-07-14 LAB
FOLATE SERPL-MCNC: 3.2 NG/ML
TSH SERPL DL<=0.005 MIU/L-ACNC: 3.69 UIU/ML (ref 0.38–5.33)

## 2022-07-14 PROCEDURE — 84443 ASSAY THYROID STIM HORMONE: CPT

## 2022-07-14 PROCEDURE — 36415 COLL VENOUS BLD VENIPUNCTURE: CPT

## 2022-07-14 PROCEDURE — 82746 ASSAY OF FOLIC ACID SERUM: CPT

## 2022-07-15 ENCOUNTER — PATIENT MESSAGE (OUTPATIENT)
Dept: MEDICAL GROUP | Facility: MEDICAL CENTER | Age: 82
End: 2022-07-15
Payer: MEDICARE

## 2022-07-18 DIAGNOSIS — Z95.0 CARDIAC PACEMAKER IN SITU: ICD-10-CM

## 2022-07-18 DIAGNOSIS — R06.02 SHORTNESS OF BREATH: ICD-10-CM

## 2022-07-18 DIAGNOSIS — E03.4 HYPOTHYROIDISM DUE TO ACQUIRED ATROPHY OF THYROID: ICD-10-CM

## 2022-07-18 DIAGNOSIS — E87.6 HYPOKALEMIA: ICD-10-CM

## 2022-07-18 RX ORDER — LEVOTHYROXINE SODIUM 0.07 MG/1
75 TABLET ORAL
Qty: 90 TABLET | Refills: 1 | Status: SHIPPED | OUTPATIENT
Start: 2022-07-18 | End: 2023-03-06

## 2022-07-18 RX ORDER — POTASSIUM CHLORIDE 750 MG/1
10 TABLET, FILM COATED, EXTENDED RELEASE ORAL 2 TIMES DAILY
Qty: 180 TABLET | Refills: 1 | Status: SHIPPED | OUTPATIENT
Start: 2022-07-18 | End: 2023-05-12

## 2022-08-12 ENCOUNTER — PATIENT MESSAGE (OUTPATIENT)
Dept: MEDICAL GROUP | Facility: MEDICAL CENTER | Age: 82
End: 2022-08-12
Payer: MEDICARE

## 2022-08-12 DIAGNOSIS — E87.6 HYPOKALEMIA: ICD-10-CM

## 2022-08-12 RX ORDER — POTASSIUM CHLORIDE 750 MG/1
10 TABLET, FILM COATED, EXTENDED RELEASE ORAL 2 TIMES DAILY
Qty: 180 TABLET | Refills: 2 | Status: SHIPPED | OUTPATIENT
Start: 2022-08-12 | End: 2023-09-07 | Stop reason: SDUPTHER

## 2022-08-22 ENCOUNTER — OFFICE VISIT (OUTPATIENT)
Dept: NEUROLOGY | Facility: MEDICAL CENTER | Age: 82
End: 2022-08-22
Attending: PSYCHIATRY & NEUROLOGY
Payer: MEDICARE

## 2022-08-22 VITALS
SYSTOLIC BLOOD PRESSURE: 112 MMHG | HEART RATE: 65 BPM | BODY MASS INDEX: 24.97 KG/M2 | TEMPERATURE: 95.7 F | RESPIRATION RATE: 18 BRPM | HEIGHT: 61 IN | OXYGEN SATURATION: 94 % | WEIGHT: 132.28 LBS | DIASTOLIC BLOOD PRESSURE: 76 MMHG

## 2022-08-22 DIAGNOSIS — R26.81 UNSTEADY GAIT WHEN WALKING: ICD-10-CM

## 2022-08-22 DIAGNOSIS — G30.1 LATE ONSET ALZHEIMER'S DEMENTIA WITH BEHAVIORAL DISTURBANCE (HCC): ICD-10-CM

## 2022-08-22 DIAGNOSIS — E53.8 LOW FOLATE: ICD-10-CM

## 2022-08-22 DIAGNOSIS — F02.818 LATE ONSET ALZHEIMER'S DEMENTIA WITH BEHAVIORAL DISTURBANCE (HCC): ICD-10-CM

## 2022-08-22 DIAGNOSIS — F03.B0 MODERATE DEMENTIA WITHOUT BEHAVIORAL DISTURBANCE (HCC): Primary | ICD-10-CM

## 2022-08-22 PROCEDURE — 99215 OFFICE O/P EST HI 40 MIN: CPT | Performed by: PSYCHIATRY & NEUROLOGY

## 2022-08-22 PROCEDURE — 99212 OFFICE O/P EST SF 10 MIN: CPT | Performed by: PSYCHIATRY & NEUROLOGY

## 2022-08-22 ASSESSMENT — MONTREAL COGNITIVE ASSESSMENT (MOCA)
ORIENTATION SUBSCORE: 3/6
6. READ LIST OF DIGITS [FORWARD/BACKWARD]: 2/2
5. MEMORY TRIALS: SECOND TRIAL
10. [FLUENCY] NAME WORDS STARTING WITH DESIGNATED LETTER: 0/1
WHAT IS THE VERSION OF MOCA ADMINISTERED: 8.2
9. REPEAT EACH SENTENCE: 2/2
DELAYED RECALL SUBSCORE: 0/5
WHAT IS THE TOTAL SCORE (OUT OF 30): 12
4. NAME EACH OF THE THREE ANIMALS SHOWN: 2/3
8. SERIAL SUBTRACTION OF 7S: 0/5
11. FOR EACH PAIR OF WORDS, WHAT CATEGORY DO THEY BELONG TO (OUT OF 2): 1/2
2. COPY DRAWING: 0/1
CATEGORY CUE (IF APPLICABLE): 3
7. [VIGILENCE] TAP WHEN HEARING DESIGNATED LETTER: 0/1
3. DRAW A CLOCK: CONTOUR, NUMBERS, HANDS: 2/3
1. ALTERNATING TRAIL MAKING: 0/1

## 2022-08-22 ASSESSMENT — FIBROSIS 4 INDEX: FIB4 SCORE: 1.96

## 2022-08-22 ASSESSMENT — PATIENT HEALTH QUESTIONNAIRE - PHQ9: CLINICAL INTERPRETATION OF PHQ2 SCORE: 0

## 2022-08-22 NOTE — PROGRESS NOTES
"Neuro follow up:    Dementia (Neurodegenerative).    Last visit in July 2021.    Kaila here with Angelita.    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     She lives alone and has been  about 4 year ago.     When asked directly about her memory and thinking abilities, Kaila does not endorse any problems at all. She denies misplacing or losing objects in the last 6 months.    She rarely reads but prefers to watch TV (Game Shows-> The Price is Right and [employer]ows)- watching both these genre's nearly every day for years. She will also watch the local news.     Kaila noticed about 12 months before the  passed away- both repeating herself within 10-20 minutes and also when she was told something she'd ask the question over ago. In a routine phone call, Kaila could easily repeat information 3-5 times within say 30 minutes. For over 2 years, Kaila will have difficulty recognizing a great grand son's name but then would forget the grand son's name question.    Even coming today Kaila is easily overwhelmed with giving her multiple instructions.    Angelita is agreement with the frequent repeating information within 15 minutes and over and over again. She is easily distracted on a topic and then will forget what she is talking about.    The daughters feel that she forgetting to eat at time(s).     In the last 2 years ago, Pastora started to have difficulty monitoring her check book because she would \"mess up\" the check book. The problem seemed to be balancing the books but not writing the checks.      Communication and speech has not clearly declined without any paraphasic errors or neologisms.      Electronics- has been pushing the wrong buttons on [employer] for over 1 year and Pastora has had to help her with this and even with the TV remote and even the Telephone (pushing the write button).     Gait-balance has not declined in the last " "1-2 years without postural changes, shuffling.     Sleep- averages 9 hours a night most nights without nightmares, REM Sleep Behavioral symptoms, snoring/grunting/gasping.     Aricept (5 mg) a day for 4 years or so.     No REM Sleep Behavioral symptoms admitted to.     No sundowning,getting lost in the house in the last 6 months.     No peng paranoia,delusions, agitation or \"good vs bad\" periods or days of alertness or attention in the recent months.        No headaches, ongoing visual disturbances (blurring, double or formed hallucinatory objects) in the recent 3 months or so.     Has not had orthostatic dizziness or peng faintiness events in the last 3 months     No history of seizure(s) or concussion(s) or significant alcohol use.     No history of dysarthria,dysphagia,stroke like events.     Has a very mild and very chronic unsteadiness when walking for which she has been for over 1 year using a roller walker for distance walking.     No history of significant alcohol use in adult life.     Denies feeling or being depressed or wanting to hurt self in or by any way or means in the recent weeks.        Patient Active Problem List    Diagnosis Date Noted    Right carpal tunnel syndrome 03/02/2022    Osteoarthritis of thumb, right 03/02/2022    Renal insufficiency 06/02/2021    Depression 06/02/2021    Knee pain, left 05/28/2021    Dyspnea 09/29/2020    Atypical nevus of neck 08/18/2020    White matter abnormality on MRI of brain 01/23/2020    Shaking 01/23/2020    Drooping eyelid, right 11/01/2019    Influenza vaccination declined 11/01/2019    Anxiety 10/22/2019    Risk for falls 10/22/2019    Mild cognitive impairment 01/03/2019    Cardiac pacemaker in situ 06/21/2018    Carotid artery aneurysm (HCC) 01/26/2017    Hypokalemia 10/07/2016    S/P cardiac catheterization, minimal coronary artery disease April 2016     Osteopenia 02/13/2013    Hypothyroidism 02/13/2013    Hyperlipidemia with target LDL less than 100 " 07/11/2011       Past medical history:   Past Medical History:   Diagnosis Date    Arthritis 03/11/2022    right hand    Cancer (HCC) 2013    basal cell head    CATARACT     bilat removed pt states no lenses    Cerebral aneurysm     Cholesterol blood decreased     Dental disorder     front upper veneers    High cholesterol 03/11/2022    medicated    Hyperlipidemia     Hypothyroidism 2/13/2013    OSTEOPOROSIS 2/13/2013    Pacemaker     Dr. AKILAH Mar    Pain 12/05/2019    right eye, 2/10    S/P cardiac catheterization     Short-term memory loss     Thyroid disease 03/11/2022    medicated       Past surgical history:   Past Surgical History:   Procedure Laterality Date    PB REVISE MEDIAN N/CARPAL TUNNEL SURG Right 3/17/2022    Procedure: RIGHT OPEN CARPAL TUNNEL RELEASE, WRIST, THUMB AND WRIST DENERVATION;  Surgeon: Luiza Solorzano M.D.;  Location: SURGERY SAME DAY [facility];  Service: Orthopedics    NEURECTOMY  3/17/2022    Procedure: NEURECTOMY;  Surgeon: Luiza Solorzano M.D.;  Location: SURGERY SAME DAY [facility];  Service: Orthopedics    RECOVERY  3/21/2016    Procedure: CATH LAB [facility] WITH POSSIBLE WIEDENBECK ;  Surgeon: Recoveryonly Surgery;  [place] [facility]  Service:     SHOULDER SURGERY Left 2016    RECOVERY  7/24/2014    Performed by [facility] at SURGERY SAME DAY [facility] ORS    RECOVERY  10/16/2013    Performed by [facility] at SURGERY [facility] ORS    SHOULDER SURGERY Right 1998    GYN SURGERY  1982    hysterectomy    OTHER Bilateral     cataract removal    OTHER NEUROLOGICAL SURG      cerebral aneurysm    TONSILLECTOMY           Social history:   Social History     Socioeconomic History    Marital status:      Spouse name: Not on file    Number of children: Not on file    Years of education: Not on file    Highest education level: Not on file   Occupational History    Not on file   Tobacco Use    Smoking status: Never    Smokeless tobacco: Never   Vaping  "Use    Vaping Use: Never used   Substance and Sexual Activity    Alcohol use: No     Alcohol/week: 0.0 oz    Drug use: No    Sexual activity: Not on file     Comment: , 4 daughters, 13 grands, 6 great-grands   Other Topics Concern    Not on file   Social History Narrative    Not on file     Social Determinants of Health     Financial Resource Strain: Not on file   Food Insecurity: Not on file   Transportation Needs: Not on file   Physical Activity: Not on file   Stress: Not on file   Social Connections: Not on file   Intimate Partner Violence: Not on file   Housing Stability: Not on file       Family history:   Family History   Problem Relation Age of Onset    Heart Disease Mother     Heart Disease Father          Current medications:   Current Outpatient Medications   Medication    potassium chloride ER (KLOR-CON 10) 10 MEQ tablet    levothyroxine (SYNTHROID) 75 MCG Tab    potassium chloride ER (KLOR-CON 10) 10 MEQ tablet    atorvastatin (LIPITOR) 40 MG Tab    Calcium Carbonate-Vitamin D (CALCIUM 600/VITAMIN D PO)    aspirin (ASA) 81 MG CHEW     No current facility-administered medications for this visit.       Medication Allergy:  Allergies   Allergen Reactions    Sulfa Drugs Rash and Swelling     Anasarca-especially face    Latex Rash and Itching    Tape Rash     Latex tape  (paper tape/tegaderm OK)           Physical examination:   Vitals:    08/22/22 0759   BP: 112/76   BP Location: Right arm   Patient Position: Sitting   BP Cuff Size: Adult   Pulse: 65   Resp: 18   Temp: (!) 35.4 °C (95.7 °F)   TempSrc: Temporal   SpO2: 94%   Weight: 60 kg (132 lb 4.4 oz)   Height: 1.549 m (5' 1\")       Normal cephalic atraumatic.  There is full range of movement around the neck in all directions without restrictions or discrete pain evoked triggers.  No lower extremity edema.      Neurological  Exam:      Kendall Cognitive Assessment (MOCA) Version 7.1    Years of Education: High School    TOTAL SCORE: 13/30  (to be " scanned into the MEDIA section in the E.M.R.)          Mental status: Awake, alert and fully oriented to person, place, and situation. Normal attention, concentration, and fund of knowledge for education level.  Did not appear/act combative,irritable,anxious,paranoid/delusional or aggressive to or with me.    Speech and language: Speech is fluent without errors, clear, intact to repetition, and intact to naming.     Follows 3 step motor commands in sequence without significant delay and correctly.    Cranial nerve exam:  II: Pupils are equally round and reactive to light. Visual fields are intact by confrontation.  III, IV, VI: EOMI, no diplopia, no ptosis.  V: Sensation to light touch is normal over V1-3 distributions bilaterally.  .  VII: Facial movements are symmetrical. There is no facial droop. .  VIII: Hearing intact to soft speech and finger rub bilaterally  IX: Palate elevates symmetrically, uvula is midline. Dysarthria is not present.  XI: Shoulder shrug are symmetrical and strong.   XII: Tongue protrudes midline.      Motor exam:  Muscle tone is normal in all 4 limbs. and No abnormal movements appreciated.    Muscle strength:    Neck Flexors/Extensors: 5/5       Right  Left  Deltoid   5/5  5/5      Biceps   5/5  5/5  Triceps  5/5  5/5   Wrist extensors 5/5  5/5  Wrist flexors  5/5  5/5     5/5  5/5  Interossei  5/5  5/5  Thenar (APB)  5/5  5/5   Hip flexors  5/5  5/5  Quadriceps  5/5  5/5    Hamstrings  5/5  5/5  Dorsiflexors  5/5  5/5  Plantarflexors  5/5  5/5  Toe extension  5/5  5/5      Coordination (finger-to-nose, heel/knee/shin, rapid alternating movements) was normal.     There was no ataxia, no tremors, and no dysmetria.     Station and gait > Easily stands up from exam chair without retropulsion,veering,leaning,swaying (to either side).     No shuffling,freezing or Rombergism.      Labs and Tests:     Blood Work Reviewed:    May 2022: reviewed >  Vitamin B12 of 283 and Folate was  3.6      Impression/Plans/Recommendations:    Moderate Stage of Dementia (neurodegenerative)- probable Alz type Dementia.    2. Unsteady gait- mild cautiousness; uses roller walker at times in home.    MOCA SCORE OF 12/30    FAQ SCORE OF 20 TODAY PER ISREAL    GLOBAL DETERIORATION SCORE OF 5 RANGE PER ISREAL    She is not parkinsonian to me.      We discussed the potential for caregiver services.    She is having problems remembering to take her medications for over the last 6 months.    There has been no peng delusions,paranoia,hallucinations or sundowning (behaviors) noticed in the last 3 to 6 months.    The pattern of deficits and very gradual changes over many years with significant short term memory disturbances supports Alz type Dementia (rather than FTD, Lewy Body or other form/type).     Global Deterioration Score of 4 to 5 range including frequently being disoriented to her grand children's names and constantly disoriented to dates and times. Daughter did not feel it was safe for Kaila to travel unassisted at this point in her Dementia condition.    Prior Brain Imaging reviewed from 2016 and 2019 results reviewed.     There are no present or evolving features of a wide based-shuffling or magnetic gait or narrow based Parkinsonian stance.     At this point do not feel that another brain imaging test at this time is necessary, we even reviewed consideration of a Brain PET scan but the daughter did not want to put her through that testing (and Pastora is the DPOA)     Today, I reviewed the clinical criteria and reviewed several  scenarios of the differences being using the medical terms of normal brain aging (age associated memory impairment),  Mild Cognitive Impairment (MCI) and Dementia.    Dementia  is a syndrome but statistically and for the majority of patients  occurs due  to a more rapid aging of the brain tissue or potentially from injury to certain parts of one's brain ( often from such contributing  "factors as  the cumulative effects of alcohol, from one or more ischemic or hemmorhagic stroke(s), from neurodegeneration or long standing with/without suboptimally controlled Hypertension). It is for some of these potential factors as to why I recommend a brain imaging test (Head CT or Brain MRI) be done for the 1st time or in certain circumstances repeated for comparison purposes  as such imaging can suggest one or more factors as to the reason(s) for the person's cognitive/memory changes. In fact, a normal or \"age related\" finding on a brain imaging test in and of itself is useful clinical and objective information to have in the evaluation of a person who has either an acute, evolving or even chronic (months to years) long cognitive/memory complaint.    Additional factors or contributors to Brain Health issues can be summarized in several books/references which I discussed as well today.     Goals going forwards include:    A. Paying attention to one's risk factors and reducing their prevalence or potential impact on one's changing memory/thinking> an excellent example would be to stop smoking, reduce or eliminate alcohol use (depending on the amount and frequency of usage), maintain good blood pressure control by buying a digital arm blood pressure cuff such as an OMRON Series 3 or 5 and checking one's blood pressure atleast 3 days per week (in the am and early afternoon) that the numbers are under 140/90 and working as needed with the primary care doctor  to optimize blood pressure control).    B. Encouraging proper sleep hygiene which for most adults is 7 to 8 hours of uninterrupted sleep per night.      C. Encouraging some form of exercise preferable aerobic forms to be done (4 to 5 days per week- 30 minutes minimum per day)> 150 minutes per week as a goal. Example activities could include fast walking (up a slight incline), jogging, cycling (road or stationary), swimming,tennis. Essentially, even basic " walking on a flat surface or a treadmill would be better than doing nothing.    D. Maintaining or forming new social contacts with family and friends  and a positive attitude despite the concerns and/or ongoing issues with thinking and/or memory.    E. Eating well which means a diet low in salt  (under 2 grams per day), sugar and saturated fat.    F. Maintaining one's BMI (Body Mass Index) under 30.    G. Consideration of the use of cognitive enhancers (acetylcholinerase inhibitors such as Aricept vs an NMDA Receptor agent like Namenda). Pros and cons of such compounds in terms of predicted efficacy and side effects profiles reviewed.  She was tapered off this medication at our last visit.    H. Alzheimer's Connect Form Filled Out today.    I. I recommended that Kaila continue taking supplemental Vitamin B12  (1 mg a day)and Folate  (around 2 mg a day). Cherie took this information down.        I have performed  a history and physical exam and a directed /focused  ROS today.    Total time spent today or this patient's care was 44 minutes  and included reviewing  the diagnostic workup to date (such as labs and imaging as well as interpreting such tests relevant to this patient's neurological condition),  reviewing/obtaining separately obtained history (from patient and/or accompanying family member)  for today's neurological problem(s) ,counseling and educating the patient and family member on issues related to cognition/memory and cognitive health factors and documenting  the clinical information in the EMR.    Follow up:PRN at this point.        Brian John MD  [place] [facility].   [facility]

## 2022-11-09 ENCOUNTER — PATIENT MESSAGE (OUTPATIENT)
Dept: HEALTH INFORMATION MANAGEMENT | Facility: OTHER | Age: 82
End: 2022-11-09

## 2023-04-24 DIAGNOSIS — E78.5 HYPERLIPIDEMIA WITH TARGET LDL LESS THAN 100: ICD-10-CM

## 2023-04-24 RX ORDER — ATORVASTATIN CALCIUM 40 MG/1
40 TABLET, FILM COATED ORAL DAILY
Qty: 90 TABLET | Refills: 2 | Status: SHIPPED | OUTPATIENT
Start: 2023-04-24

## 2023-05-12 ENCOUNTER — OFFICE VISIT (OUTPATIENT)
Dept: MEDICAL GROUP | Facility: MEDICAL CENTER | Age: 83
End: 2023-05-12
Payer: MEDICARE

## 2023-05-12 VITALS
OXYGEN SATURATION: 96 % | TEMPERATURE: 97.1 F | BODY MASS INDEX: 24.32 KG/M2 | HEART RATE: 64 BPM | SYSTOLIC BLOOD PRESSURE: 120 MMHG | WEIGHT: 128.8 LBS | DIASTOLIC BLOOD PRESSURE: 60 MMHG | HEIGHT: 61 IN

## 2023-05-12 DIAGNOSIS — F02.B0 MODERATE LATE ONSET ALZHEIMER'S DEMENTIA, UNSPECIFIED WHETHER BEHAVIORAL, PSYCHOTIC, OR MOOD DISTURBANCE OR ANXIETY (HCC): ICD-10-CM

## 2023-05-12 DIAGNOSIS — E03.4 HYPOTHYROIDISM DUE TO ACQUIRED ATROPHY OF THYROID: ICD-10-CM

## 2023-05-12 DIAGNOSIS — R25.1 SHAKING: ICD-10-CM

## 2023-05-12 DIAGNOSIS — E03.9 HYPOTHYROIDISM, UNSPECIFIED TYPE: ICD-10-CM

## 2023-05-12 DIAGNOSIS — H02.401 DROOPING EYELID, RIGHT: ICD-10-CM

## 2023-05-12 DIAGNOSIS — R06.02 SHORTNESS OF BREATH: ICD-10-CM

## 2023-05-12 DIAGNOSIS — M85.89 OSTEOPENIA OF MULTIPLE SITES: ICD-10-CM

## 2023-05-12 DIAGNOSIS — Z91.81 RISK FOR FALLS: ICD-10-CM

## 2023-05-12 DIAGNOSIS — M25.562 CHRONIC PAIN OF LEFT KNEE: ICD-10-CM

## 2023-05-12 DIAGNOSIS — M18.11 OSTEOARTHRITIS OF THUMB, RIGHT: ICD-10-CM

## 2023-05-12 DIAGNOSIS — Z95.0 CARDIAC PACEMAKER IN SITU: ICD-10-CM

## 2023-05-12 DIAGNOSIS — E78.5 HYPERLIPIDEMIA WITH TARGET LDL LESS THAN 100: ICD-10-CM

## 2023-05-12 DIAGNOSIS — R90.82 WHITE MATTER ABNORMALITY ON MRI OF BRAIN: ICD-10-CM

## 2023-05-12 DIAGNOSIS — E87.6 HYPOKALEMIA: ICD-10-CM

## 2023-05-12 DIAGNOSIS — Z00.00 MEDICARE ANNUAL WELLNESS VISIT, INITIAL: ICD-10-CM

## 2023-05-12 DIAGNOSIS — G30.1 MODERATE LATE ONSET ALZHEIMER'S DEMENTIA, UNSPECIFIED WHETHER BEHAVIORAL, PSYCHOTIC, OR MOOD DISTURBANCE OR ANXIETY (HCC): ICD-10-CM

## 2023-05-12 DIAGNOSIS — G89.29 CHRONIC PAIN OF LEFT KNEE: ICD-10-CM

## 2023-05-12 PROBLEM — D22.4 ATYPICAL NEVUS OF NECK: Status: RESOLVED | Noted: 2020-08-18 | Resolved: 2023-05-12

## 2023-05-12 PROBLEM — N28.9 RENAL INSUFFICIENCY: Status: RESOLVED | Noted: 2021-06-02 | Resolved: 2023-05-12

## 2023-05-12 PROBLEM — G56.01 RIGHT CARPAL TUNNEL SYNDROME: Status: RESOLVED | Noted: 2022-03-02 | Resolved: 2023-05-12

## 2023-05-12 PROBLEM — F41.9 ANXIETY: Status: RESOLVED | Noted: 2019-10-22 | Resolved: 2023-05-12

## 2023-05-12 PROBLEM — F32.A DEPRESSION: Status: RESOLVED | Noted: 2021-06-02 | Resolved: 2023-05-12

## 2023-05-12 PROCEDURE — G0439 PPPS, SUBSEQ VISIT: HCPCS | Performed by: PHYSICIAN ASSISTANT

## 2023-05-12 PROCEDURE — 3074F SYST BP LT 130 MM HG: CPT | Performed by: PHYSICIAN ASSISTANT

## 2023-05-12 PROCEDURE — 3078F DIAST BP <80 MM HG: CPT | Performed by: PHYSICIAN ASSISTANT

## 2023-05-12 RX ORDER — CHOLECALCIFEROL (VITAMIN D3) 125 MCG
500 CAPSULE ORAL DAILY
COMMUNITY

## 2023-05-12 RX ORDER — LEVOTHYROXINE SODIUM 0.07 MG/1
75 TABLET ORAL
Qty: 360 TABLET | Refills: 0 | Status: SHIPPED | OUTPATIENT
Start: 2023-05-12 | End: 2024-05-06

## 2023-05-12 ASSESSMENT — ACTIVITIES OF DAILY LIVING (ADL): BATHING_REQUIRES_ASSISTANCE: 0

## 2023-05-12 ASSESSMENT — FIBROSIS 4 INDEX: FIB4 SCORE: 1.99

## 2023-05-12 ASSESSMENT — PATIENT HEALTH QUESTIONNAIRE - PHQ9: CLINICAL INTERPRETATION OF PHQ2 SCORE: 0

## 2023-05-12 ASSESSMENT — ENCOUNTER SYMPTOMS: GENERAL WELL-BEING: GOOD

## 2023-05-12 NOTE — PROGRESS NOTES
Chief Complaint   Patient presents with    Annual Exam    Medication Refill       HPI:  This is a pleasant 83-year-old female accompanied by her daughter, Angelita.  Angelita visiting from the [place].  Pastora is in [place].  She is here today for annual wellness.  History of a pacemaker.  Denies any chest pain.  Also history of carotid artery aneurysm.  Is on cholesterol medication.  Has a chronic history of right drooping eyelid which is stable.  Also has a chronic history of having shortness of breath with shaking.  Symptoms occur when she gets up from a seated position.  Has them multiple times a day.  In the past we have evaluated for different disease processes but no findings.  She had does see neurology once a year given her history of dementia.  Dementia is stable.  She mostly is at home.  Relies on her daughter.  Denies any knee pain but in the past also had pain on the thumb on the right side.  She is risk for falls and uses a walker when going outside.  Did fall about 2 months ago outside doing yard work.  History of osteopenia.  Does not take the vitamin D or calcium supplement.  Does have hypokalemia and is on potassium daily.  She is due for labs.  Also history of hypothyroidism.  Last TSH value in normal range.  Needs a renewal of Synthroid.    Patient Active Problem List    Diagnosis Date Noted    Medicare annual wellness visit, initial 05/12/2023    Osteoarthritis of thumb, right 03/02/2022    Knee pain, left 05/28/2021    Dyspnea 09/29/2020    White matter abnormality on MRI of brain 01/23/2020    Shaking 01/23/2020    Drooping eyelid, right 11/01/2019    Influenza vaccination declined 11/01/2019    Risk for falls 10/22/2019    Moderate late onset Alzheimer's dementia (HCC) 01/03/2019    Cardiac pacemaker in situ 06/21/2018    Carotid artery aneurysm (HCC) 01/26/2017    Hypokalemia 10/07/2016    S/P cardiac catheterization, minimal coronary artery disease April 2016     Osteopenia 02/13/2013     Hypothyroidism 02/13/2013    Hyperlipidemia with target LDL less than 100 07/11/2011       Current Outpatient Medications   Medication Sig Dispense Refill    Multiple Vitamin (MULTIVITAMIN ADULT PO) Take  by mouth.      Levomefolate Glucosamine (METHYL-FOLATE PO) Take  by mouth.      cyanocobalamin (VITAMIN B-12) 500 MCG Tab Take 500 mcg by mouth every day.      levothyroxine (SYNTHROID) 75 MCG Tab Take 1 Tablet by mouth every morning on an empty stomach for 360 days. 360 Tablet 0    atorvastatin (LIPITOR) 40 MG Tab Take 1 Tablet by mouth every day. 90 Tablet 2    potassium chloride ER (KLOR-CON 10) 10 MEQ tablet Take 1 Tablet by mouth 2 times a day. 180 Tablet 2    Calcium Carbonate-Vitamin D (CALCIUM 600/VITAMIN D PO) Take  by mouth.      aspirin (ASA) 81 MG CHEW Take 81 mg by mouth every morning.       No current facility-administered medications for this visit.        Patient is taking medications as noted in medication list.  Current supplements as per medication list.     Allergies: Sulfa drugs, Latex, and Tape    Current social contact/activities: TV, neighbor watching, Facebook    Is patient current with immunizations? No, due for others. Patient is interested in receiving NONE today.    She  reports that she has never smoked. She has never used smokeless tobacco. She reports that she does not drink alcohol and does not use drugs.  Counseling given: Not Answered      ROS:    Gait: Uses a walker   Ostomy: No   Other tubes: No   Amputations: No   Chronic oxygen use No   Last eye exam Unknown  Wears hearing aids: Yes   : Denies any urinary leakage during the last 6 months    Screening:    Depression Screening  Little interest or pleasure in doing things?  0 - not at all  Feeling down, depressed, or hopeless? 0 - not at all  Patient Health Questionnaire Score: 0    If depressive symptoms identified deferred to follow up visit unless specifically addressed in assessment and plan.    Interpretation of PHQ-9  Total Score   Score Severity   1-4 No Depression   5-9 Mild Depression   10-14 Moderate Depression   15-19 Moderately Severe Depression   20-27 Severe Depression    Screening for Cognitive Impairment  Three Minute Recall (daughter, heaven, mountain)  0/3    [place] clock face with all 12 numbers and set the hands to show 10 past 11.  Yes    If cognitive concerns identified, deferred for follow up unless specifically addressed in assessment and plan.    Fall Risk Assessment  Has the patient had two or more falls in the last year or any fall with injury in the last year?  No  If fall risk identified, deferred for follow up unless specifically addressed in assessment and plan.    Safety Assessment  Throw rugs on floor.  Yes  Handrails on all stairs.  No  Good lighting in all hallways.  Yes  Difficulty hearing.  Yes  Patient counseled about all safety risks that were identified.    Functional Assessment ADLs  Are there any barriers preventing you from cooking for yourself or meeting nutritional needs?  No.    Are there any barriers preventing you from driving safely or obtaining transportation?  No.    Are there any barriers preventing you from using a telephone or calling for help?  No.    Are there any barriers preventing you from shopping?  No.    Are there any barriers preventing you from taking care of your own finances?  No.    Are there any barriers preventing you from managing your medications?  No.    Are there any barriers preventing you from showering, bathing or dressing yourself?  No.    Are you currently engaging in any exercise or physical activity?  No.     What is your perception of your health?  Good.    Advance Care Planning  Do you have an Advance Directive, Living Will, Durable Power of , or POLST? Yes  Advance Directive       is not on file - instructed patient to bring in a copy to scan into their chart    Health Maintenance Summary            Postponed - IMM ZOSTER VACCINES (1 of 2)  Postponed until 10/12/2023      No completion history exists for this topic.              Postponed - IMM DTaP/Tdap/Td Vaccine (2 - Td or Tdap) Postponed until 5/12/2024 01/02/2013  Imm Admin: Tdap Vaccine              Postponed - IMM INFLUENZA (Season Ended) Postponed until 10/24/2025      No completion history exists for this topic.              Annual Wellness Visit (Every 366 Days) Next due on 5/12/2024 05/12/2023  Prob Dx: Medicare annual wellness visit, initial    05/12/2023  Visit Dx: Medicare annual wellness visit, initial    10/20/2015  Visit Dx: Medicare annual wellness visit, initial              BONE DENSITY (Every 5 Years) Next due on 5/6/2027 05/06/2022  DS-BONE DENSITY STUDY (DEXA)    12/14/2015  DS-BONE DENSITY STUDY (DEXA)    03/18/2013  DS-BONE DENSITY STUDY (DEXA)    12/23/2010  DS-BONE DENSITY STUDY (DEXA)    11/06/2008  DS-BONE DENSITY STUDY (DEXA)              IMM PNEUMOCOCCAL VACCINE: 65+ Years (Series Information) Completed      10/03/2016  Imm Admin: Pneumococcal Conjugate Vaccine (Prevnar/PCV-13)    10/16/2008  Imm Admin: Pneumococcal polysaccharide vaccine (PPSV-23)              IMM HEP B VACCINE (Series Information) Aged Out      No completion history exists for this topic.              HPV Vaccines (Series Information) Aged Out      No completion history exists for this topic.              IMM MENINGOCOCCAL ACWY VACCINE (Series Information) Aged Out      No completion history exists for this topic.              Discontinued - MAMMOGRAM  Discontinued        Frequency changed to Never automatically (Topic No Longer Applies)    12/14/2015  MA-SCREEN MAMMO W/CAD-BILAT    12/11/2014  MA-SCREEING MAMMOGRAM W/ CAD    03/22/2013  MA-DIAGNOSTIC DIGITAL MAMMO-UNILAT    03/18/2013  MA-SCREENING MAMMOGRAM W/ CAD    Only the first 5 history entries have been loaded, but more history exists.              Discontinued - COLORECTAL CANCER SCREENING  Discontinued        Frequency changed  to Never automatically (Topic No Longer Applies)    12/27/2017  REFERRAL TO GI FOR COLONOSCOPY    01/01/2009  COLONOSCOPY (Done)              Discontinued - COVID-19 Vaccine  Discontinued      No completion history exists for this topic.                    Patient Care Team:  Azeem Carter P.A.-C. as PCP - General (Family Medicine)  Reginaldo Perez M.D. as Consulting Physician (Diagnostic Radiology)    Social History     Tobacco Use    Smoking status: Never    Smokeless tobacco: Never   Vaping Use    Vaping Use: Never used   Substance Use Topics    Alcohol use: No     Alcohol/week: 0.0 oz    Drug use: No     Family History   Problem Relation Age of Onset    Heart Disease Mother     Heart Disease Father      She  has a past medical history of Arthritis (03/11/2022), Cancer (HCC) (2013), CATARACT, Cerebral aneurysm, Cholesterol blood decreased, Dental disorder, High cholesterol (03/11/2022), Hyperlipidemia, Hypothyroidism (2/13/2013), OSTEOPOROSIS (2/13/2013), Pacemaker, Pain (12/05/2019), S/P cardiac catheterization, Short-term memory loss, and Thyroid disease (03/11/2022).   Past Surgical History:   Procedure Laterality Date    WA NEUROPLASTY & OR TRANSPOS MEDIAN NRV CARPAL MILAGROS Right 3/17/2022    Procedure: RIGHT OPEN CARPAL TUNNEL RELEASE, WRIST, THUMB AND WRIST DENERVATION;  Surgeon: Luiza Solorzano M.D.;  Location: SURGERY SAME DAY [facility];  Service: Orthopedics    NEURECTOMY  3/17/2022    Procedure: NEURECTOMY;  Surgeon: Luiza Solorzano M.D.;  Location: SURGERY SAME DAY [facility];  Service: Orthopedics    RECOVERY  3/21/2016    Procedure: CATH LAB [facility] WITH POSSIBLE WIEDENBECK ;  Surgeon: [facility] Surgery;  Location: SURGERY PRE-POST PROC UNIT [facility];  Service:     SHOULDER SURGERY Left 2016    RECOVERY  7/24/2014    Performed by [facility] at SURGERY SAME DAY [facility] ORS    RECOVERY  10/16/2013    Performed by [facility] [place]S    SHOULDER SURGERY Right 1998    GYN  "SURGERY  1982    hysterectomy    OTHER Bilateral     cataract removal    OTHER NEUROLOGICAL SURG      cerebral aneurysm    TONSILLECTOMY         Exam:   /60 (BP Location: Left arm, Patient Position: Sitting, BP Cuff Size: Adult)   Pulse 64   Temp 36.2 °C (97.1 °F) (Temporal)   Ht 1.549 m (5' 1\")   Wt 58.4 kg (128 lb 12.8 oz)   SpO2 96%  Body mass index is 24.34 kg/m².    Hearing good.    Dentition fair  Alert, oriented in no acute distress.  Eye contact is good, speech goal directed, affect calm      Assessment and Plan. The following treatment and monitoring plan is recommended:    1. Medicare annual wellness visit, initial    2. Osteopenia of multiple sites    3. Hypokalemia  - CBC WITH DIFFERENTIAL; Future  - Comp Metabolic Panel; Future    4. Hyperlipidemia with target LDL less than 100  - Lipid Profile; Future    5. Hypothyroidism, unspecified type  - TSH WITH REFLEX TO FT4; Future  - levothyroxine (SYNTHROID) 75 MCG Tab; Take 1 Tablet by mouth every morning on an empty stomach for 360 days.  Dispense: 360 Tablet; Refill: 0    6. Moderate late onset Alzheimer's dementia, unspecified whether behavioral, psychotic, or mood disturbance or anxiety (HCC)  - VITAMIN B12; Future    7. Cardiac pacemaker in situ    8. Drooping eyelid, right    9. Osteoarthritis of thumb, right    10. Chronic pain of left knee    11. Shaking    12. 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 Shortness of breath    14. Hypothyroidism due to acquired atrophy of thyroid    15. Risk for falls    Other orders  - Multiple Vitamin (MULTIVITAMIN ADULT PO); Take  by mouth.  - Levomefolate Glucosamine (METHYL-FOLATE PO); Take  by mouth.  - cyanocobalamin (VITAMIN B-12) 500 MCG Tab; Take 500 mcg by mouth every day.       Services suggested: No services needed at this time  Health Care Screening recommendations as per orders if indicated.  Referrals offered: PT/OT/Nutrition counseling/Behavioral Health/Smoking cessation as per orders " if indicated.    Discussion today about general wellness and lifestyle habits:    Prevent falls and reduce trip hazards; Cautioned about securing or removing rugs.  Have a working fire alarm and carbon monoxide detector;   Engage in regular physical activity and social activities     Follow-up: No follow-ups on file.

## 2023-05-12 NOTE — ASSESSMENT & PLAN NOTE
This is a pleasant 83-year-old female accompanied by her daughter, Angelita.  Angelita visiting from the [place].  Pastora is in [place].  She is here today for annual wellness.  History of a pacemaker.  Denies any chest pain.  Also history of carotid artery aneurysm.  Is on cholesterol medication.  Has a chronic history of right drooping eyelid which is stable.  Also has a chronic history of having shortness of breath with shaking.  Symptoms occur when she gets up from a seated position.  Has them multiple times a day.  In the past we have evaluated for different disease processes but no findings.  She had does see neurology once a year given her history of dementia.  Dementia is stable.  She mostly is at home.  Relies on her daughter.  Denies any knee pain but in the past also had pain on the thumb on the right side.  She is risk for falls and uses a walker when going outside.  Did fall about 2 months ago outside doing yard work.  History of osteopenia.  Does not take the vitamin D or calcium supplement.  Does have hypokalemia and is on potassium daily.  She is due for labs.  Also history of hypothyroidism.  Last TSH value in normal range.  Needs a renewal of Synthroid.

## 2023-06-09 ENCOUNTER — SUPERVISING PHYSICIAN REVIEW (OUTPATIENT)
Dept: MEDICAL GROUP | Facility: MEDICAL CENTER | Age: 83
End: 2023-06-09
Payer: MEDICARE

## 2023-06-09 DIAGNOSIS — E87.6 HYPOKALEMIA: ICD-10-CM

## 2023-06-09 DIAGNOSIS — E78.5 HYPERLIPIDEMIA WITH TARGET LDL LESS THAN 100: ICD-10-CM

## 2023-06-09 DIAGNOSIS — G30.1 MODERATE LATE ONSET ALZHEIMER'S DEMENTIA, UNSPECIFIED WHETHER BEHAVIORAL, PSYCHOTIC, OR MOOD DISTURBANCE OR ANXIETY (HCC): ICD-10-CM

## 2023-06-09 DIAGNOSIS — Z91.81 RISK FOR FALLS: ICD-10-CM

## 2023-06-09 DIAGNOSIS — F02.B0 MODERATE LATE ONSET ALZHEIMER'S DEMENTIA, UNSPECIFIED WHETHER BEHAVIORAL, PSYCHOTIC, OR MOOD DISTURBANCE OR ANXIETY (HCC): ICD-10-CM

## 2023-06-09 NOTE — PROGRESS NOTES
I have reviewed and agree with history, assessment and plan for clinic encounter on 5/12/23 with Physician Assistant Azeem Carter  Face to face encounter/direct observation: No  Suggested changes to plan or follow-up: None, agree with plan     Yamileth Calvert MD

## 2023-06-13 ENCOUNTER — HOSPITAL ENCOUNTER (OUTPATIENT)
Facility: MEDICAL CENTER | Age: 83
End: 2023-06-13
Attending: PHYSICIAN ASSISTANT
Payer: MEDICARE

## 2023-06-13 LAB
ALBUMIN SERPL BCP-MCNC: 4.1 G/DL (ref 3.2–4.9)
ALBUMIN/GLOB SERPL: 1.5 G/DL
ALP SERPL-CCNC: 101 U/L (ref 30–99)
ALT SERPL-CCNC: 14 U/L (ref 2–50)
ANION GAP SERPL CALC-SCNC: 15 MMOL/L (ref 7–16)
AST SERPL-CCNC: 23 U/L (ref 12–45)
BASOPHILS # BLD AUTO: 0.8 % (ref 0–1.8)
BASOPHILS # BLD: 0.05 K/UL (ref 0–0.12)
BILIRUB SERPL-MCNC: 0.7 MG/DL (ref 0.1–1.5)
BUN SERPL-MCNC: 18 MG/DL (ref 8–22)
CALCIUM ALBUM COR SERPL-MCNC: 9.3 MG/DL (ref 8.5–10.5)
CALCIUM SERPL-MCNC: 9.4 MG/DL (ref 8.5–10.5)
CHLORIDE SERPL-SCNC: 108 MMOL/L (ref 96–112)
CHOLEST SERPL-MCNC: 127 MG/DL (ref 100–199)
CO2 SERPL-SCNC: 19 MMOL/L (ref 20–33)
CREAT SERPL-MCNC: 1.11 MG/DL (ref 0.5–1.4)
EOSINOPHIL # BLD AUTO: 0.12 K/UL (ref 0–0.51)
EOSINOPHIL NFR BLD: 1.9 % (ref 0–6.9)
ERYTHROCYTE [DISTWIDTH] IN BLOOD BY AUTOMATED COUNT: 44 FL (ref 35.9–50)
FASTING STATUS PATIENT QL REPORTED: NORMAL
GFR SERPLBLD CREATININE-BSD FMLA CKD-EPI: 49 ML/MIN/1.73 M 2
GLOBULIN SER CALC-MCNC: 2.7 G/DL (ref 1.9–3.5)
GLUCOSE SERPL-MCNC: 87 MG/DL (ref 65–99)
HCT VFR BLD AUTO: 36.4 % (ref 37–47)
HDLC SERPL-MCNC: 62 MG/DL
HGB BLD-MCNC: 12.4 G/DL (ref 12–16)
IMM GRANULOCYTES # BLD AUTO: 0.01 K/UL (ref 0–0.11)
IMM GRANULOCYTES NFR BLD AUTO: 0.2 % (ref 0–0.9)
LDLC SERPL CALC-MCNC: 46 MG/DL
LYMPHOCYTES # BLD AUTO: 1.32 K/UL (ref 1–4.8)
LYMPHOCYTES NFR BLD: 21.4 % (ref 22–41)
MCH RBC QN AUTO: 32.5 PG (ref 27–33)
MCHC RBC AUTO-ENTMCNC: 34.1 G/DL (ref 32.2–35.5)
MCV RBC AUTO: 95.5 FL (ref 81.4–97.8)
MONOCYTES # BLD AUTO: 0.67 K/UL (ref 0–0.85)
MONOCYTES NFR BLD AUTO: 10.9 % (ref 0–13.4)
NEUTROPHILS # BLD AUTO: 4 K/UL (ref 1.82–7.42)
NEUTROPHILS NFR BLD: 64.8 % (ref 44–72)
NRBC # BLD AUTO: 0 K/UL
NRBC BLD-RTO: 0 /100 WBC (ref 0–0.2)
PLATELET # BLD AUTO: 196 K/UL (ref 164–446)
PMV BLD AUTO: 12.1 FL (ref 9–12.9)
POTASSIUM SERPL-SCNC: 4.6 MMOL/L (ref 3.6–5.5)
PROT SERPL-MCNC: 6.8 G/DL (ref 6–8.2)
RBC # BLD AUTO: 3.81 M/UL (ref 4.2–5.4)
SODIUM SERPL-SCNC: 142 MMOL/L (ref 135–145)
T4 SERPL-MCNC: 6.7 UG/DL (ref 4–12)
TRIGL SERPL-MCNC: 97 MG/DL (ref 0–149)
TSH SERPL DL<=0.005 MIU/L-ACNC: 32.2 UIU/ML (ref 0.38–5.33)
VIT B12 SERPL-MCNC: 2873 PG/ML (ref 211–911)
WBC # BLD AUTO: 6.2 K/UL (ref 4.8–10.8)

## 2023-06-13 PROCEDURE — 80061 LIPID PANEL: CPT

## 2023-06-13 PROCEDURE — 80053 COMPREHEN METABOLIC PANEL: CPT

## 2023-06-13 PROCEDURE — 82607 VITAMIN B-12: CPT

## 2023-06-13 PROCEDURE — 84443 ASSAY THYROID STIM HORMONE: CPT

## 2023-06-13 PROCEDURE — 85025 COMPLETE CBC W/AUTO DIFF WBC: CPT

## 2023-06-13 PROCEDURE — 84436 ASSAY OF TOTAL THYROXINE: CPT

## 2023-07-07 ENCOUNTER — TELEMEDICINE (OUTPATIENT)
Dept: MEDICAL GROUP | Facility: MEDICAL CENTER | Age: 83
End: 2023-07-07
Payer: MEDICARE

## 2023-07-07 VITALS — HEIGHT: 61 IN | WEIGHT: 128 LBS | BODY MASS INDEX: 24.17 KG/M2

## 2023-07-07 DIAGNOSIS — E03.9 HYPOTHYROIDISM, UNSPECIFIED TYPE: ICD-10-CM

## 2023-07-07 DIAGNOSIS — U07.1 COVID-19: ICD-10-CM

## 2023-07-07 PROCEDURE — 99214 OFFICE O/P EST MOD 30 MIN: CPT | Mod: 95 | Performed by: PHYSICIAN ASSISTANT

## 2023-07-07 ASSESSMENT — FIBROSIS 4 INDEX: FIB4 SCORE: 2.6

## 2023-07-07 NOTE — ASSESSMENT & PLAN NOTE
This is a pleasant 83-year-old female on a virtual visit today.  She is accompanied by few of her family members.  She was recently hospitalized at [facility] secondary to COVID.  She was treated with one of the inpatient antiviral medications as well as steroids.  Discharged on antibiotics and steroids.  She has been complaining of fatigue since her discharge on the 29th.  She was admitted on the 27.  Daughter, Pastora, was concerned about her history of hypothyroidism.  Recent TSH value was above 30.  It appears during her hospitalization ultrasensitive TSH was performed and it was slightly elevated at 5.9.  Free T4 at 1.10 normal range.  She had been concerned through a [employer] message that possibly her thyroid level may have been causing her the fatigue.

## 2023-07-07 NOTE — PROGRESS NOTES
Subjective:   Kaila Choudhary is a 83 y.o. female here today for recent hospitalization for COVID with a history of hypothyroidism.    This evaluation was conducted via Zoom using secure and encrypted videoconferencing technology. The patient was in their home in the [facility].    The patient's identity was confirmed and verbal consent was obtained for this virtual visit.      COVID-19  This is a pleasant 83-year-old female on a virtual visit today.  She is accompanied by few of her family members.  She was recently hospitalized at [facility] secondary to COVID.  She was treated with one of the inpatient antiviral medications as well as steroids.  Discharged on antibiotics and steroids.  She has been complaining of fatigue since her discharge on the 29th.  She was admitted on the 27.  Daughter, Pastora, was concerned about her history of hypothyroidism.  Recent TSH value was above 30.  It appears during her hospitalization ultrasensitive TSH was performed and it was slightly elevated at 5.9.  Free T4 at 1.10 normal range.  She had been concerned through a [employer] message that possibly her thyroid level may have been causing her the fatigue.       Current medicines (including changes today)  Current Outpatient Medications   Medication Sig Dispense Refill    Multiple Vitamin (MULTIVITAMIN ADULT PO) Take  by mouth.      Levomefolate Glucosamine (METHYL-FOLATE PO) Take  by mouth.      cyanocobalamin (VITAMIN B-12) 500 MCG Tab Take 500 mcg by mouth every day.      levothyroxine (SYNTHROID) 75 MCG Tab Take 1 Tablet by mouth every morning on an empty stomach for 360 days. 360 Tablet 0    atorvastatin (LIPITOR) 40 MG Tab Take 1 Tablet by mouth every day. 90 Tablet 2    potassium chloride ER (KLOR-CON 10) 10 MEQ tablet Take 1 Tablet by mouth 2 times a day. 180 Tablet 2    Calcium Carbonate-Vitamin D (CALCIUM 600/VITAMIN D PO) Take  by mouth.      aspirin (ASA) 81 MG CHEW Take 81 mg by mouth every morning.    "    No current facility-administered medications for this visit.     She  has a past medical history of Arthritis (03/11/2022), Cancer (HCC) (2013), CATARACT, Cerebral aneurysm, Cholesterol blood decreased, Dental disorder, High cholesterol (03/11/2022), Hyperlipidemia, Hypothyroidism (2/13/2013), OSTEOPOROSIS (2/13/2013), Pacemaker, Pain (12/05/2019), S/P cardiac catheterization, Short-term memory loss, and Thyroid disease (03/11/2022).    Social History and Family History were reviewed and updated.    ROS   No chest pain, no shortness of breath, no abdominal pain and all other systems were reviewed and are negative.       Objective:     Ht 1.549 m (5' 1\")   Wt 58.1 kg (128 lb)  Body mass index is 24.19 kg/m².   Physical Exam:  Constitutional: Alert, no distress.  Skin: Warm, dry, good turgor, no rashes in visible areas.  Eye: Equal, round and reactive, conjunctiva clear, lids normal.  ENMT: Lips without lesions, good dentition, oropharynx clear.  Neck: Trachea midline, no masses.   Respiratory: Unlabored respiratory effort.  Psych: Alert and oriented x3, normal affect and mood.        Assessment and Plan:   The following treatment plan was discussed    1. COVID-19  Acute, new onset condition.  Advised fatigue is quite common with COVID-19.  Expect symptoms to hopefully gradually improved.  Expect her though to continue to be active as possible.  Do not sit for long periods in her chair.  Eat healthy regain strength.    2. Hypothyroidism, unspecified type  Chronic condition.  Recent numbers from lower than [place].  Normal range.  Much better than previous numbers drawn through [facility].  Continue levothyroxine as directed.  We will check TSH value with reflex to free T4 in 3 months.  Ordered labs.  Nonfasting.  - TSH WITH REFLEX TO FT4; Future         Followup: Return if symptoms worsen or fail to improve.    Please note that this dictation was created using voice recognition software. I have made every reasonable " attempt to correct obvious errors, but I expect that there are errors of grammar and possibly content that I did not discover before finalizing the note.

## 2023-08-03 ENCOUNTER — OFFICE VISIT (OUTPATIENT)
Dept: MEDICAL GROUP | Facility: MEDICAL CENTER | Age: 83
End: 2023-08-03
Payer: MEDICARE

## 2023-08-03 VITALS
TEMPERATURE: 97.1 F | OXYGEN SATURATION: 93 % | RESPIRATION RATE: 16 BRPM | WEIGHT: 128 LBS | HEART RATE: 82 BPM | SYSTOLIC BLOOD PRESSURE: 122 MMHG | HEIGHT: 61 IN | DIASTOLIC BLOOD PRESSURE: 78 MMHG | BODY MASS INDEX: 24.17 KG/M2

## 2023-08-03 DIAGNOSIS — H91.93 DECREASED HEARING OF BOTH EARS: ICD-10-CM

## 2023-08-03 DIAGNOSIS — H61.23 BILATERAL IMPACTED CERUMEN: ICD-10-CM

## 2023-08-03 PROCEDURE — 3074F SYST BP LT 130 MM HG: CPT | Performed by: NURSE PRACTITIONER

## 2023-08-03 PROCEDURE — 3078F DIAST BP <80 MM HG: CPT | Performed by: NURSE PRACTITIONER

## 2023-08-03 PROCEDURE — 69210 REMOVE IMPACTED EAR WAX UNI: CPT | Performed by: NURSE PRACTITIONER

## 2023-08-03 ASSESSMENT — FIBROSIS 4 INDEX: FIB4 SCORE: 2.6

## 2023-08-04 NOTE — PROGRESS NOTES
"Chief Complaint   Patient presents with    Cerumen Impaction       HISTORY OF PRESENT ILLNESS: Patient is a 83 y.o. female, established patient who presents today to discuss medical problems as listed below:      Allergies: Sulfa drugs, Latex, and Tape    Current Outpatient Medications   Medication Sig Dispense Refill    Multiple Vitamin (MULTIVITAMIN ADULT PO) Take  by mouth.      Levomefolate Glucosamine (METHYL-FOLATE PO) Take  by mouth.      cyanocobalamin (VITAMIN B-12) 500 MCG Tab Take 500 mcg by mouth every day.      levothyroxine (SYNTHROID) 75 MCG Tab Take 1 Tablet by mouth every morning on an empty stomach for 360 days. 360 Tablet 0    atorvastatin (LIPITOR) 40 MG Tab Take 1 Tablet by mouth every day. 90 Tablet 2    potassium chloride ER (KLOR-CON 10) 10 MEQ tablet Take 1 Tablet by mouth 2 times a day. 180 Tablet 2    Calcium Carbonate-Vitamin D (CALCIUM 600/VITAMIN D PO) Take  by mouth.      aspirin (ASA) 81 MG CHEW Take 81 mg by mouth every morning.       No current facility-administered medications for this visit.       Allergies, past medical history, past surgical history, family history, social history reviewed and updated.    Review of Systems:     - Constitutional: Negative for fever, chills, unexpected weight change, and fatigue/generalized weakness.     - HEENT: decreased hearing. Negative for headaches, vision changes,  ear pain, ear discharge, rhinorrhea, sinus congestion, sore throat, and neck pain.      - Respiratory: Negative for cough, sputum production, chest congestion, dyspnea, wheezing, and crackles.      - Cardiovascular: Negative for chest pain, palpitations, orthopnea, and bilateral lower extremity edema.       - Psychiatric/Behavioral: Negative for depression, suicidal/homicidal ideation and memory loss.      All other systems reviewed and are negative    Exam:    /78   Pulse 82   Temp 36.2 °C (97.1 °F) (Temporal)   Resp 16   Ht 1.549 m (5' 1\")   Wt 58.1 kg (128 lb)   " LMP  (LMP Unknown)   SpO2 93%   BMI 24.19 kg/m²  Body mass index is 24.19 kg/m².    Physical Exam:  Constitutional: Well-developed and well-nourished. Not diaphoretic. No distress.   Ears:  External ears unremarkable.  Cerumen impaction bilaterally, unable to visualize TMs.    Cardiovascular: Regular rate and rhythm, S1 and S2 without murmur, rubs, or gallops.    Chest: Effort normal. Clear to auscultation throughout. No adventitious sounds.    Assessment/Plan:  Decreased hearing of both ears  Patient is here with daughter.  Decreased hearing in both ears, hearing aids in place.    1. Decreased hearing of both ears  Improved after procedure.  Patient tolerated procedure well.    2. Bilateral impacted cerumen  Resolved after procedure.  Procedure: Cerumen Removal  Risks and benefits of procedure discussed with patient.  Cerumen removed with lavage after softening agent was instilled  Patient tolerated the procedure well.  Pt educated on proper care of ear canal. Q-tip cleaning discouraged.  Post lavage curette performed by provider, Post procedure exam with clear canal and normal TM.      Procedure note    Procedure: Cerumen removal  Indications: Impacted cerumen    Patient was prepped and bilateral ears were lavaged by medical assistant.  Residual wax was curetted by provider with resolution of impaction. TM visualized with otoscope.    Code 95022           Discussed with patient possible alternative diagnoses, patient is to take all medications as prescribed.      If symptoms persist FU w/PCP, if symptoms worsen go to emergency room.      If experiencing any side effects from prescribed medications report to the office immediately or go to emergency room.     Reviewed indication, dosage, usage and potential adverse effects of prescribed medications.      Reviewed risks and benefits of treatment plan. Patient verbalizes understanding of all instruction and verbally agrees to plan.     Discussed plan with the  patient, and patient agrees to the above.      I personally reviewed prior external notes and test results pertinent to today's visit.      No follow-ups on file.

## 2023-09-07 ENCOUNTER — PATIENT MESSAGE (OUTPATIENT)
Dept: MEDICAL GROUP | Facility: MEDICAL CENTER | Age: 83
End: 2023-09-07
Payer: MEDICARE

## 2023-09-07 DIAGNOSIS — E87.6 HYPOKALEMIA: ICD-10-CM

## 2023-09-07 RX ORDER — POTASSIUM CHLORIDE 750 MG/1
10 TABLET, FILM COATED, EXTENDED RELEASE ORAL 2 TIMES DAILY
Qty: 180 TABLET | Refills: 2 | Status: SHIPPED | OUTPATIENT
Start: 2023-09-07

## 2023-11-29 ENCOUNTER — PATIENT MESSAGE (OUTPATIENT)
Dept: HEALTH INFORMATION MANAGEMENT | Facility: OTHER | Age: 83
End: 2023-11-29

## 2023-12-22 ENCOUNTER — TELEMEDICINE (OUTPATIENT)
Dept: MEDICAL GROUP | Facility: MEDICAL CENTER | Age: 83
End: 2023-12-22
Payer: MEDICARE

## 2023-12-22 VITALS — BODY MASS INDEX: 23.6 KG/M2 | HEIGHT: 61 IN | WEIGHT: 125 LBS

## 2023-12-22 DIAGNOSIS — E87.6 HYPOKALEMIA: ICD-10-CM

## 2023-12-22 DIAGNOSIS — N18.31 STAGE 3A CHRONIC KIDNEY DISEASE: ICD-10-CM

## 2023-12-22 DIAGNOSIS — E03.9 HYPOTHYROIDISM, UNSPECIFIED TYPE: ICD-10-CM

## 2023-12-22 DIAGNOSIS — E78.5 HYPERLIPIDEMIA WITH TARGET LDL LESS THAN 100: ICD-10-CM

## 2023-12-22 DIAGNOSIS — R74.8 ELEVATED VITAMIN B12 LEVEL: ICD-10-CM

## 2023-12-22 DIAGNOSIS — G89.29 CHRONIC PAIN OF LEFT KNEE: ICD-10-CM

## 2023-12-22 DIAGNOSIS — M25.562 CHRONIC PAIN OF LEFT KNEE: ICD-10-CM

## 2023-12-22 DIAGNOSIS — I72.0 CAROTID ARTERY ANEURYSM (HCC): ICD-10-CM

## 2023-12-22 PROBLEM — U07.1 COVID-19: Status: RESOLVED | Noted: 2023-07-07 | Resolved: 2023-12-22

## 2023-12-22 PROCEDURE — 99214 OFFICE O/P EST MOD 30 MIN: CPT | Mod: 95 | Performed by: PHYSICIAN ASSISTANT

## 2023-12-22 ASSESSMENT — FIBROSIS 4 INDEX: FIB4 SCORE: 2.6

## 2023-12-22 NOTE — ASSESSMENT & PLAN NOTE
This is a pleasant 83-year-old female in a virtual visit today.  Daughter, Pastora, is in the background.  Requesting a DMV form for a handicap placard.  Has chronic knee pain.  Uses a walker.  Has difficulty walking along with fatigue.  Also has Alzheimer's.  He will be due for labs in a few months.  Overall she has no new complaints today.  Taking medications as directed.

## 2023-12-22 NOTE — PROGRESS NOTES
Subjective:   Kaila Choudhary is a 83 y.o. female here today for chronic left knee pain and other chronic medical disorders.    This evaluation was conducted via Zoom using secure and encrypted videoconferencing technology. The patient was in their home in the [facility].    The patient's identity was confirmed and verbal consent was obtained for this virtual visit.    Knee pain, left  This is a pleasant 83-year-old female in a virtual visit today.  Daughter, Pastora, is in the background.  Requesting a DMV form for a handicap placard.  Has chronic knee pain.  Uses a walker.  Has difficulty walking along with fatigue.  Also has Alzheimer's.  He will be due for labs in a few months.  Overall she has no new complaints today.  Taking medications as directed.       Current medicines (including changes today)  Current Outpatient Medications   Medication Sig Dispense Refill    potassium chloride ER (KLOR-CON 10) 10 MEQ tablet Take 1 Tablet by mouth 2 times a day. 180 Tablet 2    Levomefolate Glucosamine (METHYL-FOLATE PO) Take  by mouth.      cyanocobalamin (VITAMIN B-12) 500 MCG Tab Take 500 mcg by mouth every day.      levothyroxine (SYNTHROID) 75 MCG Tab Take 1 Tablet by mouth every morning on an empty stomach for 360 days. 360 Tablet 0    atorvastatin (LIPITOR) 40 MG Tab Take 1 Tablet by mouth every day. 90 Tablet 2    Calcium Carbonate-Vitamin D (CALCIUM 600/VITAMIN D PO) Take  by mouth.      aspirin (ASA) 81 MG CHEW Take 81 mg by mouth every morning.       No current facility-administered medications for this visit.     She  has a past medical history of Arthritis (03/11/2022), Cancer (HCC) (2013), CATARACT, Cerebral aneurysm, Cholesterol blood decreased, Dental disorder, High cholesterol (03/11/2022), Hyperlipidemia, Hypothyroidism (2/13/2013), OSTEOPOROSIS (2/13/2013), Pacemaker, Pain (12/05/2019), S/P cardiac catheterization, Short-term memory loss, and Thyroid disease (03/11/2022).    Social History  "and Family History were reviewed and updated.    ROS   No chest pain, no shortness of breath, no abdominal pain and all other systems were reviewed and are negative.       Objective:     Ht 1.549 m (5' 1\")   Wt 56.7 kg (125 lb)  Body mass index is 23.62 kg/m².   Physical Exam:  Constitutional: Alert, no distress.  Skin: Warm, dry, good turgor, no rashes in visible areas.  Eye: Equal, round and reactive, conjunctiva clear, lids normal.  ENMT: Lips without lesions, good dentition, oropharynx clear.  Psych: Alert and oriented x3, normal affect and mood.        Assessment and Plan:   The following treatment plan was discussed    1. Chronic pain of left knee  Chronic condition.  Stable.  Continue use of walker as needed.  DMV form was completed.  Pastora will come into the office to  the form.    2. Carotid artery aneurysm (HCC)  Chronic condition.  Stable.  Patient requesting no follow-up.    3. Stage 3a chronic kidney disease (HCC)  Chronic condition.  Status unknown.  Ordered labs.  Fast 8 hours.  - Comp Metabolic Panel; Future    4. Elevated vitamin B12 level  Chronic condition.  No longer taking B12.  Will check vitamin B12 levels.  - VITAMIN B12; Future    5. Hypokalemia  Chronic condition.  Continue potassium.  Status unknown.  Ordered labs.  - Comp Metabolic Panel; Future    6. Hyperlipidemia with target LDL less than 100  Chronic condition.  Status unknown.  Ordered labs.  Continue atorvastatin.  - CBC WITH DIFFERENTIAL; Future  - Lipid Profile; Future    7. Hypothyroidism, unspecified type  Chronic condition.  Status unknown.  Continue levothyroxine.  Ordered labs.  - TSH WITH REFLEX TO FT4; Future         Followup: Return in about 6 months (around 6/22/2024), or if symptoms worsen or fail to improve.    Please note that this dictation was created using voice recognition software. I have made every reasonable attempt to correct obvious errors, but I expect that there are errors of grammar and possibly " content that I did not discover before finalizing the note.

## 2024-02-28 DIAGNOSIS — H91.93 DECREASED HEARING OF BOTH EARS: ICD-10-CM

## 2024-04-02 ENCOUNTER — HOSPITAL ENCOUNTER (OUTPATIENT)
Dept: LAB | Facility: MEDICAL CENTER | Age: 84
End: 2024-04-02
Attending: PHYSICIAN ASSISTANT
Payer: MEDICARE

## 2024-04-02 DIAGNOSIS — E03.9 HYPOTHYROIDISM, UNSPECIFIED TYPE: ICD-10-CM

## 2024-04-02 DIAGNOSIS — R74.8 ELEVATED VITAMIN B12 LEVEL: ICD-10-CM

## 2024-04-02 DIAGNOSIS — E87.6 HYPOKALEMIA: ICD-10-CM

## 2024-04-02 DIAGNOSIS — N18.31 STAGE 3A CHRONIC KIDNEY DISEASE: ICD-10-CM

## 2024-04-02 DIAGNOSIS — E78.5 HYPERLIPIDEMIA WITH TARGET LDL LESS THAN 100: ICD-10-CM

## 2024-04-02 LAB
ALBUMIN SERPL BCP-MCNC: 4.7 G/DL (ref 3.2–4.9)
ALBUMIN/GLOB SERPL: 1.6 G/DL
ALP SERPL-CCNC: 71 U/L (ref 30–99)
ALT SERPL-CCNC: 15 U/L (ref 2–50)
ANION GAP SERPL CALC-SCNC: 20 MMOL/L (ref 7–16)
AST SERPL-CCNC: 43 U/L (ref 12–45)
BASOPHILS # BLD AUTO: 1.6 % (ref 0–1.8)
BASOPHILS # BLD: 0.07 K/UL (ref 0–0.12)
BILIRUB SERPL-MCNC: 1.1 MG/DL (ref 0.1–1.5)
BUN SERPL-MCNC: 15 MG/DL (ref 8–22)
CALCIUM ALBUM COR SERPL-MCNC: 9.1 MG/DL (ref 8.5–10.5)
CALCIUM SERPL-MCNC: 9.7 MG/DL (ref 8.5–10.5)
CHLORIDE SERPL-SCNC: 100 MMOL/L (ref 96–112)
CHOLEST SERPL-MCNC: 247 MG/DL (ref 100–199)
CO2 SERPL-SCNC: 19 MMOL/L (ref 20–33)
CREAT SERPL-MCNC: 2 MG/DL (ref 0.5–1.4)
EOSINOPHIL # BLD AUTO: 0.17 K/UL (ref 0–0.51)
EOSINOPHIL NFR BLD: 3.9 % (ref 0–6.9)
ERYTHROCYTE [DISTWIDTH] IN BLOOD BY AUTOMATED COUNT: 52 FL (ref 35.9–50)
GFR SERPLBLD CREATININE-BSD FMLA CKD-EPI: 24 ML/MIN/1.73 M 2
GLOBULIN SER CALC-MCNC: 3 G/DL (ref 1.9–3.5)
GLUCOSE SERPL-MCNC: 80 MG/DL (ref 65–99)
HCT VFR BLD AUTO: 35.2 % (ref 37–47)
HDLC SERPL-MCNC: 85 MG/DL
HGB BLD-MCNC: 11.8 G/DL (ref 12–16)
IMM GRANULOCYTES # BLD AUTO: 0.02 K/UL (ref 0–0.11)
IMM GRANULOCYTES NFR BLD AUTO: 0.5 % (ref 0–0.9)
LDLC SERPL CALC-MCNC: 140 MG/DL
LYMPHOCYTES # BLD AUTO: 1.52 K/UL (ref 1–4.8)
LYMPHOCYTES NFR BLD: 34.9 % (ref 22–41)
MCH RBC QN AUTO: 33.9 PG (ref 27–33)
MCHC RBC AUTO-ENTMCNC: 33.5 G/DL (ref 32.2–35.5)
MCV RBC AUTO: 101.1 FL (ref 81.4–97.8)
MONOCYTES # BLD AUTO: 0.37 K/UL (ref 0–0.85)
MONOCYTES NFR BLD AUTO: 8.5 % (ref 0–13.4)
NEUTROPHILS # BLD AUTO: 2.21 K/UL (ref 1.82–7.42)
NEUTROPHILS NFR BLD: 50.6 % (ref 44–72)
NRBC # BLD AUTO: 0 K/UL
NRBC BLD-RTO: 0 /100 WBC (ref 0–0.2)
PLATELET # BLD AUTO: 142 K/UL (ref 164–446)
PMV BLD AUTO: 12.8 FL (ref 9–12.9)
POTASSIUM SERPL-SCNC: 4.1 MMOL/L (ref 3.6–5.5)
PROT SERPL-MCNC: 7.7 G/DL (ref 6–8.2)
RBC # BLD AUTO: 3.48 M/UL (ref 4.2–5.4)
SODIUM SERPL-SCNC: 139 MMOL/L (ref 135–145)
T4 FREE SERPL-MCNC: 0.22 NG/DL (ref 0.93–1.7)
TRIGL SERPL-MCNC: 109 MG/DL (ref 0–149)
TSH SERPL DL<=0.005 MIU/L-ACNC: 156 UIU/ML (ref 0.38–5.33)
VIT B12 SERPL-MCNC: 1011 PG/ML (ref 211–911)
WBC # BLD AUTO: 4.4 K/UL (ref 4.8–10.8)

## 2024-04-02 PROCEDURE — 84439 ASSAY OF FREE THYROXINE: CPT

## 2024-04-02 PROCEDURE — 84443 ASSAY THYROID STIM HORMONE: CPT

## 2024-04-02 PROCEDURE — 80053 COMPREHEN METABOLIC PANEL: CPT

## 2024-04-02 PROCEDURE — 85025 COMPLETE CBC W/AUTO DIFF WBC: CPT

## 2024-04-02 PROCEDURE — 80061 LIPID PANEL: CPT

## 2024-04-02 PROCEDURE — 36415 COLL VENOUS BLD VENIPUNCTURE: CPT

## 2024-04-02 PROCEDURE — 82607 VITAMIN B-12: CPT

## 2024-04-03 DIAGNOSIS — E03.9 HYPOTHYROIDISM, UNSPECIFIED TYPE: ICD-10-CM

## 2024-04-03 RX ORDER — LEVOTHYROXINE SODIUM 0.07 MG/1
75 TABLET ORAL
Qty: 100 TABLET | Refills: 2 | Status: SHIPPED | OUTPATIENT
Start: 2024-04-03

## 2024-04-03 NOTE — TELEPHONE ENCOUNTER
Received request via: Pharmacy    Was the patient seen in the last year in this department? Yes    Does the patient have an active prescription (recently filled or refills available) for medication(s) requested? No    Pharmacy Name: [email or web address]    Does the patient have care home Plus and need 100 day supply (blood pressure, diabetes and cholesterol meds only)? Patient does not have SCP

## 2024-04-08 ENCOUNTER — OFFICE VISIT (OUTPATIENT)
Dept: MEDICAL GROUP | Facility: MEDICAL CENTER | Age: 84
End: 2024-04-08
Payer: MEDICARE

## 2024-04-08 VITALS
HEART RATE: 61 BPM | DIASTOLIC BLOOD PRESSURE: 50 MMHG | WEIGHT: 128.4 LBS | OXYGEN SATURATION: 95 % | TEMPERATURE: 97.1 F | SYSTOLIC BLOOD PRESSURE: 86 MMHG | BODY MASS INDEX: 24.24 KG/M2 | HEIGHT: 61 IN

## 2024-04-08 DIAGNOSIS — F02.B0 MODERATE LATE ONSET ALZHEIMER'S DEMENTIA, UNSPECIFIED WHETHER BEHAVIORAL, PSYCHOTIC, OR MOOD DISTURBANCE OR ANXIETY (HCC): ICD-10-CM

## 2024-04-08 DIAGNOSIS — E87.6 HYPOKALEMIA: ICD-10-CM

## 2024-04-08 DIAGNOSIS — G30.1 MODERATE LATE ONSET ALZHEIMER'S DEMENTIA, UNSPECIFIED WHETHER BEHAVIORAL, PSYCHOTIC, OR MOOD DISTURBANCE OR ANXIETY (HCC): ICD-10-CM

## 2024-04-08 DIAGNOSIS — E03.9 HYPOTHYROIDISM, UNSPECIFIED TYPE: ICD-10-CM

## 2024-04-08 DIAGNOSIS — E53.8 LOW FOLATE: ICD-10-CM

## 2024-04-08 DIAGNOSIS — R79.89 ABNORMAL CBC: ICD-10-CM

## 2024-04-08 DIAGNOSIS — N18.4 STAGE 4 CHRONIC KIDNEY DISEASE (HCC): ICD-10-CM

## 2024-04-08 DIAGNOSIS — E78.5 HYPERLIPIDEMIA WITH TARGET LDL LESS THAN 100: ICD-10-CM

## 2024-04-08 DIAGNOSIS — R79.89 LOW VITAMIN D LEVEL: ICD-10-CM

## 2024-04-08 PROCEDURE — 99214 OFFICE O/P EST MOD 30 MIN: CPT | Performed by: PHYSICIAN ASSISTANT

## 2024-04-08 PROCEDURE — 3078F DIAST BP <80 MM HG: CPT | Performed by: PHYSICIAN ASSISTANT

## 2024-04-08 PROCEDURE — 3074F SYST BP LT 130 MM HG: CPT | Performed by: PHYSICIAN ASSISTANT

## 2024-04-08 ASSESSMENT — FIBROSIS 4 INDEX: FIB4 SCORE: 6.49

## 2024-04-08 NOTE — PROGRESS NOTES
Subjective:   Kaila Choudhary is a 83 y.o. female here today for uncontrolled hypothyroidism and dementia.    Hypothyroidism  This is a pleasant 83-year-old female accompanied by her daughter, Pastora.  Recent labs showed her TSH value at 156.  Free T4 at 0.22.  Her daughter now comes to the home early in the morning to give her the medication.  She will then go back to bed.  She is been doing this for about a week.  She also is making sure she is on her cholesterol medication.  Her LDL was in the 140s.  Her kidney function was drastically worse.  Creatinine value at 2.  GFR  at 24.  It appears that her Alzheimer's is worsening.  She was seen in the past by neurology.       Current medicines (including changes today)  Current Outpatient Medications   Medication Sig Dispense Refill    levothyroxine (SYNTHROID) 75 MCG Tab TAKE 1 TABLET BY MOUTH IN THE  MORNING ON AN EMPTY STOMACH 100 Tablet 2    potassium chloride ER (KLOR-CON 10) 10 MEQ tablet Take 1 Tablet by mouth 2 times a day. 180 Tablet 2    Levomefolate Glucosamine (METHYL-FOLATE PO) Take  by mouth.      cyanocobalamin (VITAMIN B-12) 500 MCG Tab Take 500 mcg by mouth every day.      atorvastatin (LIPITOR) 40 MG Tab Take 1 Tablet by mouth every day. 90 Tablet 2    Calcium Carbonate-Vitamin D (CALCIUM 600/VITAMIN D PO) Take  by mouth.      aspirin (ASA) 81 MG CHEW Take 81 mg by mouth every morning.       No current facility-administered medications for this visit.     She  has a past medical history of Arthritis (03/11/2022), Cancer (HCC) (2013), CATARACT, Cerebral aneurysm, Cholesterol blood decreased, Dental disorder, High cholesterol (03/11/2022), Hyperlipidemia, Hypothyroidism (2/13/2013), OSTEOPOROSIS (2/13/2013), Pacemaker, Pain (12/05/2019), S/P cardiac catheterization, Short-term memory loss, and Thyroid disease (03/11/2022).    Social History and Family History were reviewed and updated.    ROS   No chest pain, no shortness of breath, no abdominal  "pain and all other systems were reviewed and are negative.       Objective:     BP (!) 86/50 (BP Location: Left arm, Patient Position: Sitting, BP Cuff Size: Adult)   Pulse 61   Temp 36.2 °C (97.1 °F) (Temporal)   Ht 1.549 m (5' 1\")   Wt 58.2 kg (128 lb 6.4 oz)   SpO2 95%  Body mass index is 24.26 kg/m².   Physical Exam:  Constitutional: Alert, no distress.  Skin: Warm, dry, good turgor, no rashes in visible areas.  Eye: Equal, round and reactive, conjunctiva clear, lids normal.  ENMT: Lips without lesions, good dentition, oropharynx clear.  Neck: Trachea midline, no masses.   Psych: Alert and oriented x3, normal affect and mood.        Assessment and Plan:   The following treatment plan was discussed    1. Hypothyroidism, unspecified type  Chronic condition.  Uncontrolled.  It appears that her daughter is doing a good job getting her medication on a daily basis.  Check TSH value in 4 weeks total from starting the medication.  Continue levothyroxine at 75 mcg.  - TSH WITH REFLEX TO FT4; Future  - Referral to Nephrology    2. Moderate late onset Alzheimer's dementia, unspecified whether behavioral, psychotic, or mood disturbance or anxiety (HCC)  Chronic condition.  Worsening.  Check folate and vitamin D level and follow-up.  - FOLATE; Future  - VITAMIN D,25 HYDROXY (DEFICIENCY); Future    3. Stage 4 chronic kidney disease (HCC)  Chronic condition.  Worsening state.  Refer to nephrology.  Discussed drinking more water on a daily basis.  Will check kidney function in 3 weeks when she checks her thyroid level.  - Basic Metabolic Panel; Future  - Referral to Nephrology    4. Hypokalemia  Chronic condition.  Stable.  Continue daily dose of potassium.  Referred to nephrology.  - Referral to Nephrology    5. Low vitamin D level  Chronic condition.  Status unknown.  Ordered vitamin D level.  - VITAMIN D,25 HYDROXY (DEFICIENCY); Future    6. Abnormal CBC  New condition noted in chart.  Symptoms appear to be secondary to " anemia of chronic disease.  Follow with nephrology.  Will check CBC and follow-up.  - CBC WITH DIFFERENTIAL; Future  - Referral to Nephrology    7. Low folate  Chronic condition.  Continue folate supplement from over-the-counter.  Will check folate level and follow-up.  - FOLATE; Future    8. Hyperlipidemia with target LDL less than 100  Chronic condition.  Should be controlled if she is taking Lipitor at 40 mg.  Has been controlled in the past.  Will defer testing until a later date.         Followup: Return in about 3 months (around 7/8/2024), or if symptoms worsen or fail to improve, for Daughter will make an appointment in follow-up for 3 months.    Please note that this dictation was created using voice recognition software. I have made every reasonable attempt to correct obvious errors, but I expect that there are errors of grammar and possibly content that I did not discover before finalizing the note.

## 2024-04-08 NOTE — ASSESSMENT & PLAN NOTE
This is a pleasant 83-year-old female accompanied by her daughter, Pastora.  Recent labs showed her TSH value at 156.  Free T4 at 0.22.  Her daughter now comes to the home early in the morning to give her the medication.  She will then go back to bed.  She is been doing this for about a week.  She also is making sure she is on her cholesterol medication.  Her LDL was in the 140s.  Her kidney function was drastically worse.  Creatinine value at 2.  GFR  at 24.  It appears that her Alzheimer's is worsening.  She was seen in the past by neurology.

## 2024-04-15 ENCOUNTER — HOSPITAL ENCOUNTER (EMERGENCY)
Facility: MEDICAL CENTER | Age: 84
End: 2024-04-15
Payer: MEDICARE

## 2024-04-15 VITALS
SYSTOLIC BLOOD PRESSURE: 136 MMHG | BODY MASS INDEX: 24.18 KG/M2 | WEIGHT: 128.09 LBS | DIASTOLIC BLOOD PRESSURE: 96 MMHG | HEIGHT: 61 IN | HEART RATE: 92 BPM | TEMPERATURE: 98.6 F | RESPIRATION RATE: 16 BRPM | OXYGEN SATURATION: 96 %

## 2024-04-15 LAB
ALBUMIN SERPL BCP-MCNC: 4.7 G/DL (ref 3.2–4.9)
ALBUMIN/GLOB SERPL: 1.3 G/DL
ALP SERPL-CCNC: 76 U/L (ref 30–99)
ALT SERPL-CCNC: 9 U/L (ref 2–50)
ANION GAP SERPL CALC-SCNC: 16 MMOL/L (ref 7–16)
AST SERPL-CCNC: 22 U/L (ref 12–45)
BASOPHILS # BLD AUTO: 0.9 % (ref 0–1.8)
BASOPHILS # BLD: 0.06 K/UL (ref 0–0.12)
BILIRUB SERPL-MCNC: 0.8 MG/DL (ref 0.1–1.5)
BUN SERPL-MCNC: 41 MG/DL (ref 8–22)
CALCIUM ALBUM COR SERPL-MCNC: 9.4 MG/DL (ref 8.5–10.5)
CALCIUM SERPL-MCNC: 10 MG/DL (ref 8.4–10.2)
CHLORIDE SERPL-SCNC: 100 MMOL/L (ref 96–112)
CO2 SERPL-SCNC: 20 MMOL/L (ref 20–33)
CREAT SERPL-MCNC: 1.37 MG/DL (ref 0.5–1.4)
EKG IMPRESSION: NORMAL
EOSINOPHIL # BLD AUTO: 0.1 K/UL (ref 0–0.51)
EOSINOPHIL NFR BLD: 1.6 % (ref 0–6.9)
ERYTHROCYTE [DISTWIDTH] IN BLOOD BY AUTOMATED COUNT: 54.2 FL (ref 35.9–50)
GFR SERPLBLD CREATININE-BSD FMLA CKD-EPI: 38 ML/MIN/1.73 M 2
GLOBULIN SER CALC-MCNC: 3.6 G/DL (ref 1.9–3.5)
GLUCOSE SERPL-MCNC: 116 MG/DL (ref 65–99)
HCT VFR BLD AUTO: 35.1 % (ref 37–47)
HGB BLD-MCNC: 11.4 G/DL (ref 12–16)
IMM GRANULOCYTES # BLD AUTO: 0.02 K/UL (ref 0–0.11)
IMM GRANULOCYTES NFR BLD AUTO: 0.3 % (ref 0–0.9)
LYMPHOCYTES # BLD AUTO: 1.07 K/UL (ref 1–4.8)
LYMPHOCYTES NFR BLD: 16.7 % (ref 22–41)
MCH RBC QN AUTO: 34.3 PG (ref 27–33)
MCHC RBC AUTO-ENTMCNC: 32.5 G/DL (ref 32.2–35.5)
MCV RBC AUTO: 105.7 FL (ref 81.4–97.8)
MONOCYTES # BLD AUTO: 0.73 K/UL (ref 0–0.85)
MONOCYTES NFR BLD AUTO: 11.4 % (ref 0–13.4)
NEUTROPHILS # BLD AUTO: 4.43 K/UL (ref 1.82–7.42)
NEUTROPHILS NFR BLD: 69.1 % (ref 44–72)
NRBC # BLD AUTO: 0 K/UL
NRBC BLD-RTO: 0 /100 WBC (ref 0–0.2)
PLATELET # BLD AUTO: 155 K/UL (ref 164–446)
PMV BLD AUTO: 12.3 FL (ref 9–12.9)
POTASSIUM SERPL-SCNC: 4 MMOL/L (ref 3.6–5.5)
PROT SERPL-MCNC: 8.3 G/DL (ref 6–8.2)
RBC # BLD AUTO: 3.32 M/UL (ref 4.2–5.4)
SODIUM SERPL-SCNC: 136 MMOL/L (ref 135–145)
TROPONIN T SERPL-MCNC: 53 NG/L (ref 6–19)
WBC # BLD AUTO: 6.4 K/UL (ref 4.8–10.8)

## 2024-04-15 PROCEDURE — 85025 COMPLETE CBC W/AUTO DIFF WBC: CPT

## 2024-04-15 PROCEDURE — 84484 ASSAY OF TROPONIN QUANT: CPT

## 2024-04-15 PROCEDURE — 80053 COMPREHEN METABOLIC PANEL: CPT

## 2024-04-15 PROCEDURE — 93005 ELECTROCARDIOGRAM TRACING: CPT

## 2024-04-15 PROCEDURE — 302449 STATCHG TRIAGE ONLY (STATISTIC)

## 2024-04-15 ASSESSMENT — FIBROSIS 4 INDEX: FIB4 SCORE: 6.49

## 2024-05-16 ENCOUNTER — HOSPITAL ENCOUNTER (OUTPATIENT)
Dept: LAB | Facility: MEDICAL CENTER | Age: 84
End: 2024-05-16
Attending: PHYSICIAN ASSISTANT
Payer: MEDICARE

## 2024-05-16 DIAGNOSIS — F02.B0 MODERATE LATE ONSET ALZHEIMER'S DEMENTIA, UNSPECIFIED WHETHER BEHAVIORAL, PSYCHOTIC, OR MOOD DISTURBANCE OR ANXIETY (HCC): ICD-10-CM

## 2024-05-16 DIAGNOSIS — N18.4 STAGE 4 CHRONIC KIDNEY DISEASE (HCC): ICD-10-CM

## 2024-05-16 DIAGNOSIS — G30.1 MODERATE LATE ONSET ALZHEIMER'S DEMENTIA, UNSPECIFIED WHETHER BEHAVIORAL, PSYCHOTIC, OR MOOD DISTURBANCE OR ANXIETY (HCC): ICD-10-CM

## 2024-05-16 DIAGNOSIS — E53.8 LOW FOLATE: ICD-10-CM

## 2024-05-16 DIAGNOSIS — E03.9 HYPOTHYROIDISM, UNSPECIFIED TYPE: ICD-10-CM

## 2024-05-16 DIAGNOSIS — R79.89 ABNORMAL CBC: ICD-10-CM

## 2024-05-16 DIAGNOSIS — R79.89 LOW VITAMIN D LEVEL: ICD-10-CM

## 2024-05-16 LAB
25(OH)D3 SERPL-MCNC: 38 NG/ML (ref 30–100)
ANION GAP SERPL CALC-SCNC: 15 MMOL/L (ref 7–16)
BASOPHILS # BLD AUTO: 1.3 % (ref 0–1.8)
BASOPHILS # BLD: 0.07 K/UL (ref 0–0.12)
BUN SERPL-MCNC: 33 MG/DL (ref 8–22)
CALCIUM SERPL-MCNC: 9.8 MG/DL (ref 8.5–10.5)
CHLORIDE SERPL-SCNC: 105 MMOL/L (ref 96–112)
CO2 SERPL-SCNC: 21 MMOL/L (ref 20–33)
CREAT SERPL-MCNC: 1.18 MG/DL (ref 0.5–1.4)
EOSINOPHIL # BLD AUTO: 0.3 K/UL (ref 0–0.51)
EOSINOPHIL NFR BLD: 5.4 % (ref 0–6.9)
ERYTHROCYTE [DISTWIDTH] IN BLOOD BY AUTOMATED COUNT: 50.4 FL (ref 35.9–50)
FOLATE SERPL-MCNC: >40 NG/ML
GFR SERPLBLD CREATININE-BSD FMLA CKD-EPI: 46 ML/MIN/1.73 M 2
GLUCOSE SERPL-MCNC: 113 MG/DL (ref 65–99)
HCT VFR BLD AUTO: 37.7 % (ref 37–47)
HGB BLD-MCNC: 12.4 G/DL (ref 12–16)
IMM GRANULOCYTES # BLD AUTO: 0.02 K/UL (ref 0–0.11)
IMM GRANULOCYTES NFR BLD AUTO: 0.4 % (ref 0–0.9)
LYMPHOCYTES # BLD AUTO: 1.16 K/UL (ref 1–4.8)
LYMPHOCYTES NFR BLD: 20.9 % (ref 22–41)
MCH RBC QN AUTO: 34.5 PG (ref 27–33)
MCHC RBC AUTO-ENTMCNC: 32.9 G/DL (ref 32.2–35.5)
MCV RBC AUTO: 105 FL (ref 81.4–97.8)
MONOCYTES # BLD AUTO: 0.75 K/UL (ref 0–0.85)
MONOCYTES NFR BLD AUTO: 13.5 % (ref 0–13.4)
NEUTROPHILS # BLD AUTO: 3.24 K/UL (ref 1.82–7.42)
NEUTROPHILS NFR BLD: 58.5 % (ref 44–72)
NRBC # BLD AUTO: 0 K/UL
NRBC BLD-RTO: 0 /100 WBC (ref 0–0.2)
PLATELET # BLD AUTO: 200 K/UL (ref 164–446)
PMV BLD AUTO: 12.4 FL (ref 9–12.9)
POTASSIUM SERPL-SCNC: 4.9 MMOL/L (ref 3.6–5.5)
RBC # BLD AUTO: 3.59 M/UL (ref 4.2–5.4)
SODIUM SERPL-SCNC: 141 MMOL/L (ref 135–145)
TSH SERPL DL<=0.005 MIU/L-ACNC: 2.15 UIU/ML (ref 0.38–5.33)
WBC # BLD AUTO: 5.5 K/UL (ref 4.8–10.8)

## 2024-05-17 PROBLEM — Z91.81 RISK FOR FALLS: Status: RESOLVED | Noted: 2019-10-22 | Resolved: 2024-05-17

## 2024-05-22 ENCOUNTER — PATIENT MESSAGE (OUTPATIENT)
Dept: MEDICAL GROUP | Facility: MEDICAL CENTER | Age: 84
End: 2024-05-22
Payer: MEDICARE

## 2024-05-23 ENCOUNTER — PATIENT MESSAGE (OUTPATIENT)
Dept: MEDICAL GROUP | Facility: MEDICAL CENTER | Age: 84
End: 2024-05-23
Payer: MEDICARE

## 2024-05-28 DIAGNOSIS — G89.29 CHRONIC PAIN OF LEFT KNEE: ICD-10-CM

## 2024-05-28 DIAGNOSIS — Z91.81 RISK FOR FALLS: ICD-10-CM

## 2024-05-28 DIAGNOSIS — F02.B0 MODERATE LATE ONSET ALZHEIMER'S DEMENTIA, UNSPECIFIED WHETHER BEHAVIORAL, PSYCHOTIC, OR MOOD DISTURBANCE OR ANXIETY (HCC): ICD-10-CM

## 2024-05-28 DIAGNOSIS — M25.562 CHRONIC PAIN OF LEFT KNEE: ICD-10-CM

## 2024-05-28 DIAGNOSIS — G30.1 MODERATE LATE ONSET ALZHEIMER'S DEMENTIA, UNSPECIFIED WHETHER BEHAVIORAL, PSYCHOTIC, OR MOOD DISTURBANCE OR ANXIETY (HCC): ICD-10-CM

## 2024-05-28 DIAGNOSIS — R25.1 SHAKING: ICD-10-CM

## 2024-06-13 DIAGNOSIS — E78.5 HYPERLIPIDEMIA WITH TARGET LDL LESS THAN 100: ICD-10-CM

## 2024-06-13 NOTE — TELEPHONE ENCOUNTER
Received request via: Pharmacy    Was the patient seen in the last year in this department? Yes    Does the patient have an active prescription (recently filled or refills available) for medication(s) requested? No    [facility]    Does the patient have longterm Plus and need 100 day supply (blood pressure, diabetes and cholesterol meds only)? Patient does not have SCP

## 2024-06-14 RX ORDER — ATORVASTATIN CALCIUM 40 MG/1
40 TABLET, FILM COATED ORAL DAILY
Qty: 90 TABLET | Refills: 3 | Status: SHIPPED | OUTPATIENT
Start: 2024-06-14

## 2024-09-01 DIAGNOSIS — E87.6 HYPOKALEMIA: ICD-10-CM

## 2024-09-03 RX ORDER — POTASSIUM CHLORIDE 750 MG/1
10 TABLET, EXTENDED RELEASE ORAL 2 TIMES DAILY
Qty: 180 TABLET | Refills: 3 | Status: SHIPPED | OUTPATIENT
Start: 2024-09-03

## 2024-10-01 NOTE — ED PROVIDER NOTES
----- Message from Tiffany Martinez MD sent at 9/29/2024 12:49 PM CDT -----  No concerning findings on CT scan. Follow up with PCP and annie taylor in gi clinic for ongoing gi symptoms    ED Provider Note    CHIEF COMPLAINT  No chief complaint on file.      HPI  Kaila Choudhary is a 81 y.o. female with a history of hyperlipidemia, hypothyroidism, cerebral aneurysm, short-term memory loss, status post pacemaker placement, hypothyroidism who presents with complaints of increased shortness of breath, weakness, and fatigue for the past week.  The patient has had complaints of shortness of breath for several weeks and has seen both her cardiologist and pulmonary medicine associates.  Daughter says that she was told that he did not appear that her shortness of breath was due to either her heart or her lungs.  The patient has continued have feelings of shortness of breath especially with exertional activity.  She has developed some pain to her left hip and right ankle.  Patient is unable to recall falling, and her daughter says she would not remember because of her short-term memory loss.  Today she was having significant difficulty trying to stand and walk because of pain to her ankle and hip.  The patient has had no fever, shaking chills, sore throat, productive cough, congestion.  She has had decreased appetite, but no nausea, vomiting, or diarrhea.  Patient has had nothing to eat today because of lack of appetite.  The patient denies any current headache, neck pain, chest pain, or abdominal pain.  She does complain of having some pain to her lower back, left hip, and right ankle.    REVIEW OF SYSTEMS  See HPI for further details. All other systems are negative.     PAST MEDICAL HISTORY  Past Medical History:   Diagnosis Date   • Cancer (HCC) 2013    basal cell head   • CATARACT     bilat removed   • Cerebral aneurysm    • Cholesterol blood decreased    • Dental disorder     front upper veneers   • High cholesterol    • Hyperlipidemia    • Hypothyroidism 2/13/2013   • OSTEOPOROSIS 2/13/2013   • Pacemaker     Dr. AKILAH Mar   • Pain 12/05/2019    right eye, 2/10   • S/P cardiac catheterization    •  "Short-term memory loss    • Thyroid disease        FAMILY HISTORY  Family History   Problem Relation Age of Onset   • Heart Disease Mother    • Heart Disease Father        SOCIAL HISTORY  Social History     Tobacco Use   • Smoking status: Never Smoker   • Smokeless tobacco: Never Used   Vaping Use   • Vaping Use: Never used   Substance Use Topics   • Alcohol use: No     Alcohol/week: 0.0 oz   • Drug use: No      Social History     Substance and Sexual Activity   Drug Use No       SURGICAL HISTORY  Past Surgical History:   Procedure Laterality Date   • RECOVERY  3/21/2016    Procedure: CATH LAB [facility] WITH POSSIBLE WIEDENBECK ;  Surgeon: [facility];  [place] [facility] [place]  [place]:    • SHOULDER SURGERY Left 2016   • RECOVERY  7/24/2014    Performed by [facility] at SURGERY SAME DAY [facility] ORS   • RECOVERY  10/16/2013    Performed by [facility] at SURGERY [facility] ORS   • SHOULDER SURGERY Right 1998   • GYN SURGERY  1982    hysterectomy   • OTHER Bilateral     cataract removal   • OTHER NEUROLOGICAL SURG      cerebral aneurysm   • TONSILLECTOMY         CURRENT MEDICATIONS  Home Medications    **Home medications have not yet been reviewed for this encounter**         ALLERGIES  Allergies   Allergen Reactions   • Sulfa Drugs Rash and Swelling     Anasarca-especially face   • Latex Rash and Itching   • Tape Rash     Latex tape  (paper tape OK)       PHYSICAL EXAM0  VITAL SIGNS: Blood Pressure 125/74   Pulse 74   Temperature 36.6 °C (97.9 °F) (Temporal)   Respiration 18   Height 1.549 m (5' 1\")   Weight 66.9 kg (147 lb 7.8 oz)   Last Menstrual Period  (LMP Unknown)   Oxygen Saturation 94%   Body Mass Index 27.87 kg/m²   Constitutional: Awake, alert, in no acute distress, Non-toxic appearance.   HENT: Atraumatic. Bilateral external ears normal, mucous membranes mildly dry, throat nonerythematous without exudates, nose is normal.  Eyes: PERRL, EOMI, conjunctiva " moist, noninjected.  Neck: Nontender, Normal range of motion, No nuchal rigidity, No stridor.   Lymphatic: No lymphadenopathy noted.   Cardiovascular: Regular rate and rhythm, no murmurs, rubs, gallops.  Thorax & Lungs:  Good breath sounds bilaterally, no wheezes, rales, or retractions.  No chest tenderness.  Abdomen: Bowel sounds normal, Soft, nontender, nondistended, no rebound, guarding, masses.  Back: No CVA or spinal tenderness.  Extremities: Intact distal pulses, there is some mild swelling and tenderness noted to the lateral malleolus of the right ankle.  No significant erythema, or increased warmth.  There is pain with attempts to do straight leg raises on the left, pain with attempts to flex the hip.  No pain with passive rotation of the hip.  No tenderness to the left knee or ankle.  Skin: Warm, Dry, No rashes.   Musculoskeletal: No joint swelling or tenderness.  Neurologic: Alert & oriented x 2, cranial nerves II through XII appear intact, speech is fluent, no facial asymmetry, sensory intact light touch, and motor function shows 5/5 strength to the upper extremities, 4/5 strength to the right lower extremity on flexion/extension at the ankle and foot due to her pain.  There is 4/5 strength to the left lower extremity on dorsi/plantarflexion of the left foot and ankle.  Patient unable to do straight leg raises on the left secondary to pain to her hip.  Psychiatric: Affect normal, Judgment normal, Mood normal.     EKG  EKG Interpretation:  Interpreted by me    Rhythm: Atrial paced complexes,  Rate: 60  Intervals: Normal  Axis: left axis  Ectopy: none  Conduction: normal  ST Segments: no evidence of elevation or depression  T Waves: no acute change  Q Waves: none  Clinical Impression: No acute injury or ischemic pattern.   Comparison to previous EKG from October 2016 shows the patient to be in a paced rhythm now.      LABS  Labs Reviewed   CBC WITH DIFFERENTIAL - Abnormal; Notable for the following  "components:       Result Value    RBC 4.12 (*)     Platelet Count 159 (*)     Neutrophils-Polys 74.60 (*)     Lymphocytes 10.20 (*)     Monocytes 13.50 (*)     Lymphs (Absolute) 0.87 (*)     Monos (Absolute) 1.16 (*)     All other components within normal limits   COMP METABOLIC PANEL - Abnormal; Notable for the following components:    Potassium 3.5 (*)     Glucose 116 (*)     All other components within normal limits   URINALYSIS - Abnormal; Notable for the following components:    Ketones Trace (*)     Occult Blood Trace (*)     All other components within normal limits    Narrative:     Indication for culture:->Emergency Room Patient   D-DIMER - Abnormal; Notable for the following components:    D-Dimer Screen 2.01 (*)     All other components within normal limits   SED RATE - Abnormal; Notable for the following components:    Sed Rate Westergren 62 (*)     All other components within normal limits   CRP QUANTITIVE (NON-CARDIAC) - Abnormal; Notable for the following components:    Stat C-Reactive Protein 6.51 (*)     All other components within normal limits   LACTIC ACID - Abnormal; Notable for the following components:    Lactic Acid 2.1 (*)     All other components within normal limits   PROBRAIN NATRIURETIC PEPTIDE, NT - Abnormal; Notable for the following components:    NT-proBNP 1101 (*)     All other components within normal limits   ESTIMATED GFR - Abnormal; Notable for the following components:    GFR If Non  50 (*)     All other components within normal limits   URINE MICROSCOPIC (W/UA) - Abnormal; Notable for the following components:    Hyaline Cast >10 (*)     All other components within normal limits    Narrative:     Indication for culture:->Emergency Room Patient   LACTIC ACID   URINE CULTURE(NEW)    Narrative:     Indication for culture:->Emergency Room Patient   BLOOD CULTURE    Narrative:     Per Hospital Policy: Only change Specimen Src: to \"Line\" if  specified by physician " "order.   BLOOD CULTURE    Narrative:     Per Hospital Policy: Only change Specimen Src: to \"Line\" if  specified by physician order.   URIC ACID   BLOOD CULTURE    Narrative:     Per Hospital Policy: Only change Specimen Src: to \"Line\" if  specified by physician order.   TROPONIN   LACTIC ACID   BLOOD CULTURE       All labs reviewed by me.      RADIOLOGY/PROCEDURES  CT-CTA CHEST PULMONARY ARTERY W/ RECONS   Final Result      1.  No evidence of pulmonary embolus.      2.  Mild emphysematous change of the lungs.      3.  Atherosclerotic vascular calcification.      DX-HIP-COMPLETE - UNILATERAL 2+ LEFT   Final Result      No evidence of left hip fracture or degenerative change.      DX-ANKLE 3+ VIEWS RIGHT   Final Result      No evidence of acute fracture or dislocation.      DX-CHEST-PORTABLE (1 VIEW)   Final Result      Cardiomegaly.          The radiologist's interpretations of all radiological studies have been reviewed by me.        COURSE & MEDICAL DECISION MAKING  Pertinent Labs & Imaging studies reviewed. (See chart for details)  Patient presents with above complaints.  On examination she is breathing comfortably, no obvious wheezes.  Oxygen saturation is 94% on room air.  EKG shows a paced rhythm.  Chest x-ray shows no acute cardiopulmonary abnormalities.  Given liter bolus of lactated Ringer's as clinically she appears dehydrated with dry mucous membranes and poor p.o. intake.    CBC shows a white count of 8600, hemoglobin 13.0, platelets 159, 75% polys, 10% lymphs, 14% monocytes, chemistry shows a potassium 3.5, glucose 116, GFR of 50, lactic acid elevated 2.1.  Sedimentation rate 62, C-reactive protein 6.51 proBNP 1101, D-dimer elevated 2.01, INR 0.99.  Troponin less than 6, BNP elevated 1101.  Urinalysis negative.  X-rays of the right ankle and left hip show no acute fractures.  She does have some swelling to the right ankle and unknown whether she may have fallen.  She does have elevated inflammatory " markers.  Uric acid is normal at 4.8.  Examination shows no signs of a septic joint.  Because of the elevated D-dimer, CT scan of the chest was obtained which was negative for pulmonary embolism, showed mild emphysematous changes of the lungs.  EKG was unremarkable, troponin normal.  Given the patient's symptoms, she will be admitted.  Case discussed with the hospitalist.        FINAL IMPRESSION  1. Shortness of breath    2. Weakness    3. Acute right ankle pain    4. Hip pain, acute, left          Electronically signed by: Kashif Hernández M.D., 5/27/2021 5:08 PM

## 2024-10-02 NOTE — ASSESSMENT & PLAN NOTE
Recent fall reported  PT/OT: SNF, referred  Fall precautions   [Two or more falls in past year] : Patient reported two or more falls in the past year [Independent] : feeding [Some assistance needed] : using telephone [Full assistance needed] : managing medications [] : managing finances [Any fall with injury in past year] : Patient reported fall with injury in the past year [Yes] : The patient has visual impairment [[facility]] : 14 [HRA Reviewed] : Health Risk Assessment reviewed

## 2025-01-13 DIAGNOSIS — E03.9 HYPOTHYROIDISM, UNSPECIFIED TYPE: ICD-10-CM

## 2025-01-13 RX ORDER — LEVOTHYROXINE SODIUM 75 UG/1
75 TABLET ORAL
Qty: 90 TABLET | Refills: 0 | Status: SHIPPED | OUTPATIENT
Start: 2025-01-13

## 2025-02-20 ENCOUNTER — PATIENT MESSAGE (OUTPATIENT)
Dept: MEDICAL GROUP | Facility: MEDICAL CENTER | Age: 85
End: 2025-02-20
Payer: MEDICARE

## 2025-02-20 DIAGNOSIS — E03.9 HYPOTHYROIDISM, UNSPECIFIED TYPE: ICD-10-CM

## 2025-02-20 RX ORDER — LEVOTHYROXINE SODIUM 75 UG/1
75 TABLET ORAL
Qty: 90 TABLET | Refills: 0 | Status: SHIPPED | OUTPATIENT
Start: 2025-02-20

## 2025-03-21 ENCOUNTER — TELEPHONE (OUTPATIENT)
Dept: OPHTHALMOLOGY | Facility: MEDICAL CENTER | Age: 85
End: 2025-03-21
Payer: MEDICARE

## 2025-03-24 NOTE — PROGRESS NOTES
Peds/Neuro Ophthalmology:   Sonido Saleh M.D.    Date & Time note created:    3/25/2025   3:59 PM     Referring MD / APRN:  Azeem Carter P.A.-C., No att. providers found    Patient ID:  Name:             Kaila Choudhary   YOB: 1940  Age:                 84 y.o.  female   MRN:               [id number]    Chief Complaint/Reason for Visit:     Other (New pt eval for diplopia hx of sixth nerve palsy)      History of Present Illness:      Kaila Choudhary is a 83 yo woman who presents for new patient evaluation of double vision     She is referred by Dr Corrigan at [facility]. Exam 2/25/25 demonstrated VA 20/25+ OD 20/25 OS, L hypertropia, bilateral ptosis, normal slit lamp exam, normal fundus exam. She was given prism for the double vision ( 4 prism diopters LHT), but continues to struggle with diplopia. She was subsequently referred to neuro ophthy for further evaluation.     Of note exam and history is limited due to moderate dementia. Kaila reports her double vision is vertical and constant. She thinks this has been present for many years, but cannot recall if it started following the aneurysm repair. She also has a droopy eyelid  OD that has been present for many years as well. She believes the droopy eyelid began in the 70s after hitting her head. Per chart review Kaila has a history of  bilateral ICA aneurysms and underwent endovascular repair of the R cavernous ICA aneurysm in 4/2005. She required a second procedure 11/2013 after which she developed a R 6th nerve palsy. Patient herself is unable to recall if she had horizontal diplopia at a point in time. 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 s/p endovascular coiling of the R ICA aneurysm in 4/2005 and repeat coiling due to aneurysm recurrence  10/16/13  Moderate dementia (likely Alzheimers) follows with Dr John    Final rx   OS 0.75 sphere 2 BU   OD -1.00 1.00 x 77 2 BD         Review of Systems:  ROS    Past Medical History:   Past Medical History:   Diagnosis Date    Arthritis 03/11/2022    right hand    Cancer (HCC) 2013    basal cell head    CATARACT     bilat removed pt states no lenses    Cerebral aneurysm     Cholesterol blood decreased     Dental disorder     front upper veneers    High cholesterol 03/11/2022    medicated    Hyperlipidemia     Hypothyroidism 2/13/2013    OSTEOPOROSIS 2/13/2013    Pacemaker     Dr. AKILAH Mar    Pain 12/05/2019    right eye, 2/10    S/P cardiac catheterization     Short-term memory loss     Thyroid disease 03/11/2022    medicated       Past Surgical History:  Past Surgical History:   Procedure Laterality Date    CO NEUROPLASTY & OR TRANSPOS MEDIAN NRV CARPAL MILAGROS Right 3/17/2022    Procedure: RIGHT OPEN CARPAL TUNNEL RELEASE, WRIST, THUMB AND WRIST DENERVATION;  Surgeon: Luiza Solorzano M.D.;  Location: SURGERY SAME DAY [facility];  Service: Orthopedics    NEURECTOMY  3/17/2022    Procedure: NEURECTOMY;  Surgeon: Luiza Solorzano M.D.;  Location: SURGERY SAME DAY [facility];  Service: Orthopedics    RECOVERY  3/21/2016    Procedure: CATH LAB [facility] WITH POSSIBLE WIEDENBECK ;  Surgeon: [facility] Surgery;  [place] [facility] [place]:     SHOULDER SURGERY Left 2016    RECOVERY  7/24/2014    Performed by [facility] at SURGERY SAME DAY [facility]    RECOVERY  10/16/2013    Performed by [facility] at [facility] ORS    SHOULDER SURGERY Right 1998    GYN SURGERY  1982    hysterectomy    OTHER Bilateral     cataract removal    OTHER NEUROLOGICAL SURG      cerebral aneurysm    TONSILLECTOMY         Current Outpatient Medications:  Current Outpatient Medications   Medication Sig Dispense Refill    levothyroxine (SYNTHROID) 75 MCG Tab Take 1 Tablet by mouth every morning on an  empty stomach. 90 Tablet 0    potassium chloride ER (KLOR-CON) 10 MEQ tablet TAKE 1 TABLET BY MOUTH TWICE  DAILY 180 Tablet 3    atorvastatin (LIPITOR) 40 MG Tab TAKE 1 TABLET BY MOUTH DAILY 90 Tablet 3    Levomefolate Glucosamine (METHYL-FOLATE PO) Take  by mouth.      cyanocobalamin (VITAMIN B-12) 500 MCG Tab Take 500 mcg by mouth every day.      Calcium Carbonate-Vitamin D (CALCIUM 600/VITAMIN D PO) Take  by mouth.      aspirin (ASA) 81 MG CHEW Take 81 mg by mouth every morning.       No current facility-administered medications for this visit.       Allergies:  Allergies   Allergen Reactions    Sulfa Drugs Rash and Swelling     Anasarca-especially face    Latex Rash and Itching    Tape Rash     Latex tape  (paper tape/tegaderm OK)       Family History:  Family History   Problem Relation Age of Onset    Heart Disease Mother     Heart Disease Father        Social History:  Social History     Socioeconomic History    Marital status:      Spouse name: Not on file    Number of children: Not on file    Years of education: Not on file    Highest education level: Not on file   Occupational History    Not on file   Tobacco Use    Smoking status: Never    Smokeless tobacco: Never   Vaping Use    Vaping status: Never Used   Substance and Sexual Activity    Alcohol use: No     Alcohol/week: 0.0 oz    Drug use: No    Sexual activity: Not on file     Comment: , 4 daughters, 13 grands, 6 great-grands   Other Topics Concern    Not on file   Social History Narrative    Not on file     Social Drivers of Health     Financial Resource Strain: Not on file   Food Insecurity: Not on file   Transportation Needs: Not on file   Physical Activity: Not on file   Stress: Not on file   Social Connections: Not on file   Intimate Partner Violence: Not on file   Housing Stability: Not on file          Physical Exam:  Physical Exam    Oriented x 3  Weight/BMI: There is no height or weight on file to calculate BMI.  There were no  vitals taken for this visit.    Base Eye Exam       Visual Acuity (Snellen - Linear)         Right Left    Dist cc 20/25 20/20    Near cc J1+ J1+      Correction: Glasses              Tonometry (i-care, 8:24 AM)         Right Left    Pressure 12 12              Pupils         Dark Light Shape React APD    Right 3 2 Round Brisk None    Left 2 1 Round Brisk None              Visual Fields         Right Left     Full Full              Extraocular Movement         Right Left      Full     -- 10 --   --  --   -- 60 --    -- -- --   --  --   -- -- --      Unable to overcome R supraduction deficit with VOR              Neuro/Psych       Oriented x3: Yes    Mood/Affect: Normal   Poor historian                  Additional Tests       Color         Right Left    Ishihara 6/9 7/9              Stereo       Fly: +    Animals: 3/3    Circles: 4/9                  Strabismus Exam       Method: Alternate cover    Correction: sc      Distance Near Near +3DS N Bifocals                      - - 10 - -   - - - - - -                      LHT 3 - -  - -  LHT 8 - -  - -  LHT 6     ET 12     ET 6     ET 7      - - 60 - -   - - - - - -                   R Tilt L Tilt   LHT 7 ET 12   ET 8                Alignment testing demonstrates a R 6th nerve palsy       Slit Lamp and Fundus Exam       External Exam         Right Left    External Normal Normal    MRD1 0 mm 1 mm    MRD2 3 mm 5 mm              Slit Lamp Exam         Right Left    Lids/Lashes partial ptosis Normal    Conjunctiva/Sclera White and quiet White and quiet    Cornea Clear Clear    Anterior Chamber Deep and quiet Deep and quiet    Iris Round and reactive Round and reactive    Lens Posterior chamber intraocular lens Posterior chamber intraocular lens              Fundus Exam         Right Left    Disc Pink, sharp disc margins, flat Pink, sharp disc margins, flat    C/D Ratio 0.6 0.6    Macula Normal Normal                  Refraction       Wearing Rx         Sphere Cylinder Axis  Add    Right +0.50 +0.50 005 +1.75    Left -1.00 +1.00 006 +1.50      Age: 1yr    Type: PAL              Manifest Refraction (Auto)         Sphere Cylinder Axis    Right -0.25 +1.00 100    Left -1.75 +1.50 168                    Pertinent Lab/Test/Imaging Review:    Cerebral angio 9/30/16  1.  There is stable 2 mm residual neck at the previously repaired right internal carotid artery aneurysm.  2.  The small left cavernous carotid aneurysm is not well-visualized in this study most likely secondary to the overlap of the contralateral coil mass. This can be followed with the MR angiogram after 2 years.       MRI brain 10/7/16  1.  Age-related cerebral atrophy.  2.  Mild to moderate periventricular white matter changes consistent with chronic microvascular ischemic gliosis.  3.  No evidence of acute infarction in the brain parenchyma.  4.  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  Assessment and Plan:     Carotid artery aneurysm (HCC)  Bilateral cavernous ICA aneurysms (R 2cm, L 3-4 mm). s/p endovascular coiling of the R ICA aneurysm in 4/2005 (done in [place]) and repeat coiling due to aneurysm recurrence 10/16/13 (Dr Perez)   History of R 6th nerve palsy following 2013 coiling    Plan:   Alt cover testing demonstrates a residual incomitant ET worse on R gaze which would be consistent with a R 6th nerve palsy. MRA head wo pending given worsening diplopia     Moderate late onset Alzheimer's dementia (HCC)  Previous patient of Dr John  Pt a poor historian   No improvement on namenda    Suspect dementia is contributing to his worsening symptoms     Ptosis of right eyelid  Partial ptosis OD  Reports onset decades ago after trauma    Ophthalmoplegia  Exam 3/25/25: prominent supraduction deficit OD with mild infraduction deficit OD. Unable to overcome with VOR. +longstanding ptosis OD. Alt cover demonstrates incomitant LHT worse on L gaze and R head tilt, as well as  incomitant ET worse on R gaze    Plan:   Patient has multiple efferent abnormalities on exam. The R eye appears to have a Supra>infraduction deficit with ptosis that is unable to be overcome with VOR. Unclear onset of findings as patient is a poor historian. Given ICA aneurysm history will obtain MRA head. MRI brain wwo to evaluate LHT, but will additionally evaluate the extraocular muscles. Thyroid function and MG panel additionally ordered    Hypertropia of left eye  Pt currently has 4 prism diopters in rx. Unclear onset of vertical diplopia, pt's daughter feels it has been present for years    Exam 3/25/25: large incomitant LHT worse on left head gaze and R head tilt.     Plan:   Alignment testing is inconsistent with a nerve palsy. It is difficult to correct her subjective diplopia on central gaze because of her dementia and the presence of both horizontal and vertical misalignment. She will occasionally self treat the diplopia with her L ptosis. Will obtain MRI brain wwo to rule out skew deviation causes, evaluate the extraocular muscles. Will obtain MG panel, anti-striated antibodies and thyroid function. RTC in 4-6 months for repeat exam. In the meantime occluded an eye with scotch tape over lens    -RTC in 4-6 months  -MRI brain wwo and MRA head wo  -MG panel, anti-striated antibodies, TSH with reflex   -Occlusion of any eye for symptomatic relief    Right abducens nerve palsy  Exam 3/25/25: Incomitant ET worse on R gaze    Plan:   Exam consistent with a R 6th nerve palsy. Per chart review patient was diagnosed with a R 6th nerve palsy following her second R cavernous ICA aneurysm coiling. This is consistent with the anatomical location of the 6th nerve adjacent the internal carotid artery. Due to her dementia however patient denies experiencing horizontal diplopia in the past and now. Will continue to monitor. Repeat MRA head wo obtained as it is unclear if the 6th nerve palsy resolved and if there is any  aneurysm recurrence        Sonido Saleh M.D.    64 total minutes were spent reviewing imaging, records, examining the patient and documenting.

## 2025-03-25 ENCOUNTER — HOSPITAL ENCOUNTER (OUTPATIENT)
Dept: LAB | Facility: MEDICAL CENTER | Age: 85
End: 2025-03-25
Attending: STUDENT IN AN ORGANIZED HEALTH CARE EDUCATION/TRAINING PROGRAM
Payer: MEDICARE

## 2025-03-25 ENCOUNTER — OFFICE VISIT (OUTPATIENT)
Dept: OPHTHALMOLOGY | Facility: MEDICAL CENTER | Age: 85
End: 2025-03-25
Payer: MEDICARE

## 2025-03-25 DIAGNOSIS — H50.22 HYPERTROPIA OF LEFT EYE: ICD-10-CM

## 2025-03-25 DIAGNOSIS — I72.0 CAROTID ARTERY ANEURYSM (HCC): ICD-10-CM

## 2025-03-25 DIAGNOSIS — H02.401 PTOSIS OF RIGHT EYELID: ICD-10-CM

## 2025-03-25 DIAGNOSIS — H49.9 OPHTHALMOPLEGIA: ICD-10-CM

## 2025-03-25 DIAGNOSIS — E03.9 HYPOTHYROIDISM, UNSPECIFIED TYPE: ICD-10-CM

## 2025-03-25 DIAGNOSIS — G30.1 MODERATE LATE ONSET ALZHEIMER'S DEMENTIA, UNSPECIFIED WHETHER BEHAVIORAL, PSYCHOTIC, OR MOOD DISTURBANCE OR ANXIETY (HCC): ICD-10-CM

## 2025-03-25 DIAGNOSIS — F02.B0 MODERATE LATE ONSET ALZHEIMER'S DEMENTIA, UNSPECIFIED WHETHER BEHAVIORAL, PSYCHOTIC, OR MOOD DISTURBANCE OR ANXIETY (HCC): ICD-10-CM

## 2025-03-25 DIAGNOSIS — I67.1 NONRUPTURED CEREBRAL ANEURYSM: ICD-10-CM

## 2025-03-25 DIAGNOSIS — H49.21 RIGHT ABDUCENS NERVE PALSY: ICD-10-CM

## 2025-03-25 DIAGNOSIS — H53.2 DOUBLE VISION: ICD-10-CM

## 2025-03-25 LAB
T4 FREE SERPL-MCNC: 1.49 NG/DL (ref 0.93–1.7)
TSH SERPL-ACNC: 1.91 UIU/ML (ref 0.35–5.5)

## 2025-03-25 PROCEDURE — 86042 ACETYLCHOLN RCPTR BLCKG ANTB: CPT

## 2025-03-25 PROCEDURE — 86041 ACETYLCHOLN RCPTR BNDNG ANTB: CPT

## 2025-03-25 PROCEDURE — 36415 COLL VENOUS BLD VENIPUNCTURE: CPT

## 2025-03-25 PROCEDURE — 84443 ASSAY THYROID STIM HORMONE: CPT

## 2025-03-25 PROCEDURE — 92060 SENSORIMOTOR EXAMINATION: CPT | Performed by: STUDENT IN AN ORGANIZED HEALTH CARE EDUCATION/TRAINING PROGRAM

## 2025-03-25 PROCEDURE — 99205 OFFICE O/P NEW HI 60 MIN: CPT | Mod: 25 | Performed by: STUDENT IN AN ORGANIZED HEALTH CARE EDUCATION/TRAINING PROGRAM

## 2025-03-25 PROCEDURE — 84439 ASSAY OF FREE THYROXINE: CPT

## 2025-03-25 PROCEDURE — 92250 FUNDUS PHOTOGRAPHY W/I&R: CPT | Performed by: STUDENT IN AN ORGANIZED HEALTH CARE EDUCATION/TRAINING PROGRAM

## 2025-03-25 ASSESSMENT — VISUAL ACUITY
OS_CC: 20/20
OD_CC: J1+
METHOD: SNELLEN - LINEAR
CORRECTION_TYPE: GLASSES
OS_CC: J1+
OD_CC: 20/25

## 2025-03-25 ASSESSMENT — REFRACTION_MANIFEST
OS_AXIS: 168
OD_AXIS: 100
OD_CYLINDER: +1.00
METHOD_AUTOREFRACTION: 1
OS_SPHERE: -1.75
OS_CYLINDER: +1.50
OD_SPHERE: -0.25

## 2025-03-25 ASSESSMENT — EXTERNAL EXAM - RIGHT EYE: OD_EXAM: NORMAL

## 2025-03-25 ASSESSMENT — CONF VISUAL FIELD
OS_INFERIOR_NASAL_RESTRICTION: 0
OS_INFERIOR_TEMPORAL_RESTRICTION: 0
OD_INFERIOR_NASAL_RESTRICTION: 0
OD_NORMAL: 1
OS_SUPERIOR_TEMPORAL_RESTRICTION: 0
OD_INFERIOR_TEMPORAL_RESTRICTION: 0
OD_SUPERIOR_TEMPORAL_RESTRICTION: 0
OS_NORMAL: 1
OD_SUPERIOR_NASAL_RESTRICTION: 0
OS_SUPERIOR_NASAL_RESTRICTION: 0

## 2025-03-25 ASSESSMENT — TONOMETRY
IOP_METHOD: I-CARE
OD_IOP_MMHG: 12
OS_IOP_MMHG: 12

## 2025-03-25 ASSESSMENT — MARGIN REFLEX DISTANCE
OS_MRD1: 1
OD_MRD1: 0
OD_MRD2: 3
OS_MRD2: 5

## 2025-03-25 ASSESSMENT — CUP TO DISC RATIO
OD_RATIO: 0.6
OS_RATIO: 0.6

## 2025-03-25 ASSESSMENT — REFRACTION_WEARINGRX
OS_ADD: +1.50
OS_CYLINDER: +1.00
OD_SPHERE: +0.50
OD_CYLINDER: +0.50
SPECS_TYPE: PAL
OS_SPHERE: -1.00
OS_AXIS: 006
OD_ADD: +1.75
OD_AXIS: 005

## 2025-03-25 ASSESSMENT — SLIT LAMP EXAM - LIDS
COMMENTS: PARTIAL PTOSIS
COMMENTS: NORMAL

## 2025-03-25 ASSESSMENT — EXTERNAL EXAM - LEFT EYE: OS_EXAM: NORMAL

## 2025-03-25 NOTE — ASSESSMENT & PLAN NOTE
Previous patient of Dr John  Pt a poor historian   No improvement on namenda    Suspect dementia is contributing to his worsening symptoms

## 2025-03-25 NOTE — ASSESSMENT & PLAN NOTE
Pt currently has 4 prism diopters in rx. Unclear onset of vertical diplopia, pt's daughter feels it has been present for years    Exam 3/25/25: large incomitant LHT worse on left head gaze and R head tilt.     Plan:   Alignment testing is inconsistent with a nerve palsy. It is difficult to correct her subjective diplopia on central gaze because of her dementia and the presence of both horizontal and vertical misalignment. She will occasionally self treat the diplopia with her L ptosis. Will obtain MRI brain wwo to rule out skew deviation causes, evaluate the extraocular muscles. Will obtain MG panel, anti-striated antibodies and thyroid function. RTC in 4-6 months for repeat exam. In the meantime occluded an eye with scotch tape over lens    -RTC in 4-6 months  -MRI brain wwo and MRA head wo  -MG panel, anti-striated antibodies, TSH with reflex   -Occlusion of any eye for symptomatic relief

## 2025-03-25 NOTE — ASSESSMENT & PLAN NOTE
Exam 3/25/25: Incomitant ET worse on R gaze    Plan:   Exam consistent with a R 6th nerve palsy. Per chart review patient was diagnosed with a R 6th nerve palsy following her second R cavernous ICA aneurysm coiling. This is consistent with the anatomical location of the 6th nerve adjacent the internal carotid artery. Due to her dementia however patient denies experiencing horizontal diplopia in the past and now. Will continue to monitor. Repeat MRA head wo obtained as it is unclear if the 6th nerve palsy resolved and if there is any aneurysm recurrence

## 2025-03-25 NOTE — ASSESSMENT & PLAN NOTE
Bilateral cavernous ICA aneurysms (R 2cm, L 3-4 mm). s/p endovascular coiling of the R ICA aneurysm in 4/2005 (done in [place]) and repeat coiling due to aneurysm recurrence 10/16/13 (Dr Perez)   History of R 6th nerve palsy following 2013 coiling    Plan:   Alt cover testing demonstrates a residual incomitant ET worse on R gaze which would be consistent with a R 6th nerve palsy. MRA head wo pending given worsening diplopia

## 2025-03-25 NOTE — ASSESSMENT & PLAN NOTE
Exam 3/25/25: prominent supraduction deficit OD with mild infraduction deficit OD. Unable to overcome with VOR. +longstanding ptosis OD. Alt cover demonstrates incomitant LHT worse on L gaze and R head tilt, as well as incomitant ET worse on R gaze    Plan:   Patient has multiple efferent abnormalities on exam. The R eye appears to have a Supra>infraduction deficit with ptosis that is unable to be overcome with VOR. Unclear onset of findings as patient is a poor historian. Given ICA aneurysm history will obtain MRA head. MRI brain wwo to evaluate LHT, but will additionally evaluate the extraocular muscles. Thyroid function and MG panel additionally ordered

## 2025-03-27 LAB
ACHR BIND AB SER-SCNC: 0 NMOL/L (ref 0–0.4)
ACHR BLOCK AB/ACHR TOTAL SFR SER: 0 % (ref 0–26)

## 2025-04-16 ENCOUNTER — TELEPHONE (OUTPATIENT)
Dept: RADIOLOGY | Facility: MEDICAL CENTER | Age: 85
End: 2025-04-16

## 2025-05-02 ENCOUNTER — HOSPITAL ENCOUNTER (OUTPATIENT)
Dept: RADIOLOGY | Facility: MEDICAL CENTER | Age: 85
End: 2025-05-02
Attending: STUDENT IN AN ORGANIZED HEALTH CARE EDUCATION/TRAINING PROGRAM
Payer: MEDICARE

## 2025-05-02 VITALS
OXYGEN SATURATION: 99 % | RESPIRATION RATE: 14 BRPM | DIASTOLIC BLOOD PRESSURE: 64 MMHG | HEART RATE: 60 BPM | SYSTOLIC BLOOD PRESSURE: 121 MMHG

## 2025-05-02 DIAGNOSIS — E03.9 HYPOTHYROIDISM, UNSPECIFIED TYPE: ICD-10-CM

## 2025-05-02 DIAGNOSIS — F02.B0 MODERATE LATE ONSET ALZHEIMER'S DEMENTIA, UNSPECIFIED WHETHER BEHAVIORAL, PSYCHOTIC, OR MOOD DISTURBANCE OR ANXIETY (HCC): ICD-10-CM

## 2025-05-02 DIAGNOSIS — H53.2 DOUBLE VISION: ICD-10-CM

## 2025-05-02 DIAGNOSIS — I72.0 CAROTID ARTERY ANEURYSM (HCC): ICD-10-CM

## 2025-05-02 DIAGNOSIS — G30.1 MODERATE LATE ONSET ALZHEIMER'S DEMENTIA, UNSPECIFIED WHETHER BEHAVIORAL, PSYCHOTIC, OR MOOD DISTURBANCE OR ANXIETY (HCC): ICD-10-CM

## 2025-05-02 PROCEDURE — 70553 MRI BRAIN STEM W/O & W/DYE: CPT

## 2025-05-02 PROCEDURE — 70544 MR ANGIOGRAPHY HEAD W/O DYE: CPT

## 2025-05-02 PROCEDURE — A9579 GAD-BASE MR CONTRAST NOS,1ML: HCPCS | Mod: JZ | Performed by: STUDENT IN AN ORGANIZED HEALTH CARE EDUCATION/TRAINING PROGRAM

## 2025-05-02 PROCEDURE — 700117 HCHG RX CONTRAST REV CODE 255: Mod: JZ | Performed by: STUDENT IN AN ORGANIZED HEALTH CARE EDUCATION/TRAINING PROGRAM

## 2025-05-02 RX ADMIN — GADOTERIDOL 10 ML: 279.3 INJECTION, SOLUTION INTRAVENOUS at 14:37

## 2025-05-02 NOTE — PROGRESS NOTES
I met patient in MRI 1 pre-procedure room sitting comfortable sitting in chair. She is a pleasant 83 year old lady.  On speaking and assessing her she appears oriented x3 and is aware of having a procedure today with us that requires her pacemaker mode to be put into Mri Mode .patient has had this non-conditional Pacer since 2016 due to AV Block, 3rd Degree , Syncope and Bradycardia .[place]  from [facility] to place the pacer into MRI MODE, DOO 60bpm ( prior DDR 60bpm)   I explained to her that she will need to squeeze the Call Ball ( given to her while she is in the MRI) to report any discomfort or pain in her chest. Patient states understanding of instructions given. MRI was succedsfully completed without any events. [place] to turn patient Pacer back to pre-MRI DOO Mode 60bpm. Patient afer Mri accompanied [facility] Waiting area to meet her 2 daughters. I explained to both daughters to remove the dressing from the I.v site that was removed after CT , once home. Both daughters accompanied patient back to the car.  Detailed information on Pacer Modes Reports see scanned information.

## 2025-05-08 ENCOUNTER — TELEPHONE (OUTPATIENT)
Dept: OPHTHALMOLOGY | Facility: MEDICAL CENTER | Age: 85
End: 2025-05-08
Payer: MEDICARE

## 2025-05-14 ENCOUNTER — OFFICE VISIT (OUTPATIENT)
Dept: MEDICAL GROUP | Facility: MEDICAL CENTER | Age: 85
End: 2025-05-14
Payer: MEDICARE

## 2025-05-14 VITALS
OXYGEN SATURATION: 96 % | HEIGHT: 61 IN | BODY MASS INDEX: 23.56 KG/M2 | SYSTOLIC BLOOD PRESSURE: 100 MMHG | WEIGHT: 124.8 LBS | HEART RATE: 61 BPM | TEMPERATURE: 96.8 F | DIASTOLIC BLOOD PRESSURE: 60 MMHG

## 2025-05-14 DIAGNOSIS — Z91.81 RISK FOR FALLS: ICD-10-CM

## 2025-05-14 DIAGNOSIS — H02.401 PTOSIS OF RIGHT EYELID: ICD-10-CM

## 2025-05-14 DIAGNOSIS — Z95.0 CARDIAC PACEMAKER IN SITU: ICD-10-CM

## 2025-05-14 DIAGNOSIS — H91.93 DECREASED HEARING OF BOTH EARS: ICD-10-CM

## 2025-05-14 DIAGNOSIS — M85.89 OSTEOPENIA OF MULTIPLE SITES: ICD-10-CM

## 2025-05-14 DIAGNOSIS — I72.0 CAROTID ARTERY ANEURYSM (HCC): ICD-10-CM

## 2025-05-14 DIAGNOSIS — E78.5 HYPERLIPIDEMIA WITH TARGET LDL LESS THAN 100: ICD-10-CM

## 2025-05-14 DIAGNOSIS — E03.9 HYPOTHYROIDISM, UNSPECIFIED TYPE: ICD-10-CM

## 2025-05-14 DIAGNOSIS — E87.6 HYPOKALEMIA: ICD-10-CM

## 2025-05-14 DIAGNOSIS — N18.31 STAGE 3A CHRONIC KIDNEY DISEASE: ICD-10-CM

## 2025-05-14 DIAGNOSIS — F02.B0 MODERATE LATE ONSET ALZHEIMER'S DEMENTIA, UNSPECIFIED WHETHER BEHAVIORAL, PSYCHOTIC, OR MOOD DISTURBANCE OR ANXIETY (HCC): ICD-10-CM

## 2025-05-14 DIAGNOSIS — M25.562 CHRONIC PAIN OF LEFT KNEE: ICD-10-CM

## 2025-05-14 DIAGNOSIS — H53.2 MONOCULAR DIPLOPIA OF LEFT EYE: ICD-10-CM

## 2025-05-14 DIAGNOSIS — G30.1 MODERATE LATE ONSET ALZHEIMER'S DEMENTIA, UNSPECIFIED WHETHER BEHAVIORAL, PSYCHOTIC, OR MOOD DISTURBANCE OR ANXIETY (HCC): ICD-10-CM

## 2025-05-14 DIAGNOSIS — M18.11 OSTEOARTHRITIS OF THUMB, RIGHT: ICD-10-CM

## 2025-05-14 DIAGNOSIS — H50.22 HYPERTROPIA OF LEFT EYE: ICD-10-CM

## 2025-05-14 DIAGNOSIS — R06.09 DYSPNEA ON EXERTION: ICD-10-CM

## 2025-05-14 DIAGNOSIS — G89.29 CHRONIC PAIN OF LEFT KNEE: ICD-10-CM

## 2025-05-14 DIAGNOSIS — E53.8 LOW FOLATE: ICD-10-CM

## 2025-05-14 DIAGNOSIS — R90.82 WHITE MATTER ABNORMALITY ON MRI OF BRAIN: ICD-10-CM

## 2025-05-14 DIAGNOSIS — H49.9 OPHTHALMOPLEGIA: ICD-10-CM

## 2025-05-14 DIAGNOSIS — Z00.00 MEDICARE ANNUAL WELLNESS VISIT, SUBSEQUENT: Primary | ICD-10-CM

## 2025-05-14 PROBLEM — H49.21 RIGHT ABDUCENS NERVE PALSY: Status: RESOLVED | Noted: 2025-03-25 | Resolved: 2025-05-14

## 2025-05-14 PROBLEM — R25.1 SHAKING: Status: RESOLVED | Noted: 2020-01-23 | Resolved: 2025-05-14

## 2025-05-14 PROBLEM — R79.89 ABNORMAL CBC: Status: RESOLVED | Noted: 2024-04-08 | Resolved: 2025-05-14

## 2025-05-14 ASSESSMENT — FIBROSIS 4 INDEX: FIB4 SCORE: 3.12

## 2025-05-14 ASSESSMENT — ACTIVITIES OF DAILY LIVING (ADL)
TRANSPORTATION COMMENTS: FAMILY PROVIDES TRANSPORTATION
BATHING_REQUIRES_ASSISTANCE: 0

## 2025-05-14 ASSESSMENT — PATIENT HEALTH QUESTIONNAIRE - PHQ9: CLINICAL INTERPRETATION OF PHQ2 SCORE: 0

## 2025-05-14 ASSESSMENT — ENCOUNTER SYMPTOMS: GENERAL WELL-BEING: GOOD

## 2025-05-14 NOTE — PROGRESS NOTES
Chief Complaint   Patient presents with    Medicare Annual Wellness       HPI:  Kaila Choudhary is a 85 y.o. here for Medicare Annual Wellness Visit.    History of Present Illness  The patient presents for an annual wellness visit.     She reports satisfactory auditory function with the use of bilateral hearing aids. Her dental health remains stable. She has a pacemaker implanted and does not experience any respiratory distress. However, she experiences dyspnea on exertion, necessitating rest periods during activities such as rising from a seated position. She has a history of carotid artery aneurysm, which was managed with coil placement in 2006. She is not currently under the care of a cardiologist. She underwent an MRI last week, but the results are not yet available. She is compliant with her medication regimen, which includes levothyroxine 75 mcg, potassium chloride 10 mEq twice daily, atorvastatin 40 mg daily, baby aspirin, folate, vitamin B12, and a calcium supplement. She acknowledges inadequate hydration.    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 She also reports ptosis in the same eye.    She has a long-standing diagnosis of dementia, which she manages effectively. She utilizes a walker for mobility and has not experienced any recent falls.    She has a history of osteopenia and is currently on calcium and vitamin D supplements.    She reports no pain in her right thumb, despite a previous diagnosis of osteoarthritis confirmed by x-ray.    She continues to experience pain in her left knee, which she describes as feeling swollen.    PAST SURGICAL HISTORY:  - Coil placement for carotid artery aneurysm in 2006    SOCIAL HISTORY  She has 4 daughters.        Patient Active Problem List    Diagnosis Date Noted    Hypertropia of left eye 03/25/2025    Low folate 04/08/2024    Stage 3a chronic kidney disease 12/22/2023    Decreased hearing of  both ears 08/03/2023    Medicare annual wellness visit, subsequent 05/12/2023    Osteoarthritis of thumb, right 03/02/2022    Knee pain, left 05/28/2021    Dyspnea 09/29/2020    White matter abnormality on MRI of brain 01/23/2020    Monocular diplopia of left eye 01/23/2020    Ptosis of right eyelid 11/01/2019    Influenza vaccination declined 11/01/2019    Risk for falls 10/22/2019    Moderate late onset Alzheimer's dementia (HCC) 01/03/2019    Cardiac pacemaker in situ 06/21/2018    Carotid artery aneurysm (HCC) 01/26/2017    Hypokalemia 10/07/2016    S/P cardiac catheterization, minimal coronary artery disease April 2016     Osteopenia 02/13/2013    Hypothyroidism 02/13/2013    Hyperlipidemia with target LDL less than 100 07/11/2011       Current Medications[1]       Current supplements as per medication list.     Allergies: Sulfa drugs, Latex, and Tape    Current social contact/activities: Play slots.     She  reports that she has never smoked. She has never used smokeless tobacco. She reports that she does not drink alcohol and does not use drugs.  Counseling given: Not Answered      ROS:    Gait: Uses a walker  Ostomy: No  Other tubes: No  Amputations: No  Chronic oxygen use: No  Last eye exam: 2025  Wears hearing aids: Yes   : Denies any urinary leakage during the last 6 months    Screening:    Depression Screening  Little interest or pleasure in doing things?  0 - not at all  Feeling down, depressed , or hopeless? 0 - not at all  Patient Health Questionnaire Score: 0     If depressive symptoms identified deferred to follow up visit unless specifically addressed in assessment and plan.    Interpretation of PHQ-9 Total Score   Score Severity   1-4 No Depression   5-9 Mild Depression   10-14 Moderate Depression   15-19 Moderately Severe Depression   20-27 Severe Depression    Screening for Cognitive Impairment  Do you or any of your friends or family members have any concern about your memory? Yes  Three  Minute Recall (Village, Kitchen, Baby) 3/3    [place] clock face with all 12 numbers and set the hands to show 10 minutes past 11.  Yes    Cognitive concerns identified deferred for follow up unless specifically addressed in assessment and plan.    Fall Risk Assessment  Has the patient had two or more falls in the last year or any fall with injury in the last year?  No    Safety Assessment  Do you always wear your seatbelt?  Yes  Any changes to home needed to function safely? No  Difficulty hearing.  Yes  Patient counseled about all safety risks that were identified.    Functional Assessment ADLs  Are there any barriers preventing you from cooking for yourself or meeting nutritional needs?  No.    Are there any barriers preventing you from driving safely or obtaining transportation?  No. Family provides transportation   Are there any barriers preventing you from using a telephone or calling for help?  No    Are there any barriers preventing you from shopping?  No. Family helps.   Are there any barriers preventing you from taking care of your own finances?  Yes Family helps   Are there any barriers preventing you from managing your medications?  Yes Daughters help  Are there any barriers preventing you from showering, bathing or dressing yourself? No    Are there any barriers preventing you from doing housework or laundry? No  Are there any barriers preventing you from using the toilet?No  Are you currently engaging in any exercise or physical activity?  No.      Self-Assessment of Health  What is your perception of your health? Good    Do you sleep more than six hours a night? Yes    In the past 7 days, how much did pain keep you from doing your normal work? None    Do you spend quality time with family or friends (virtually or in person)? Yes    Do you usually eat a heart healthy diet that constists of a variety of fruits, vegetables, whole grains and fiber? Yes    Do you eat foods high in fat and/or Fast Food more  than three times per week? No    How concerned are you that your medical conditions are not being well managed? Not at all    Are you worried that in the next 2 months, you may not have stable housing that you own, rent, or stay in as part of a household? No      Advance Care Planning  Do you have an Advance Directive, Living Will, Durable Power of , or POLST? Yes  Advance Directive       is not on file - instructed patient to bring in a copy to scan into their chart      Health Maintenance Summary            Current Care Gaps       IMM DTaP/Tdap/Td Vaccine (2 - Td or Tdap) Overdue since 1/2/2023 01/02/2013  Imm Admin: Tdap Vaccine                      Awaiting Completion       Bone Density Scan (Every 5 Years) Order placed this encounter      05/14/2025  Order placed for DS-BONE DENSITY STUDY (DEXA) by Azeem Carter P.A.-C.    05/06/2022  DS-BONE DENSITY STUDY (DEXA)    12/14/2015  DS-BONE DENSITY STUDY (DEXA)    03/18/2013  DS-BONE DENSITY STUDY (DEXA)    12/23/2010  DS-BONE DENSITY STUDY (DEXA)     Only the first 5 history entries have been loaded, but more history exists.                    Upcoming       Influenza Vaccine (Season Ended) Postponed until 10/24/2025      No completion history exists for this topic.              Annual Wellness Visit (Yearly) Next due on 5/14/2026 05/14/2025  Visit Dx: Medicare annual wellness visit, subsequent    05/12/2023  Visit Dx: Medicare annual wellness visit, initial    05/12/2023  Prob Dx: Medicare annual wellness visit, subsequent    10/20/2015  Visit Dx: Medicare annual wellness visit, initial                      Completed or No Longer Recommended       Pneumococcal Vaccine: 50+ Years (Series Information) Completed      10/03/2016  Imm Admin: Pneumococcal Conjugate Vaccine (Prevnar/PCV-13)    10/16/2008  Imm Admin: Pneumococcal polysaccharide vaccine (PPSV-23)              Hepatitis A Vaccine (Hep A) (Series Information) Aged Out     No completion  history exists for this topic.              Hepatitis B Vaccine (Hep B) (Series Information) Aged Out     No completion history exists for this topic.              HPV Vaccines (Series Information) Aged Out     No completion history exists for this topic.              Polio Vaccine (Inactivated Polio) (Series Information) Aged Out     No completion history exists for this topic.              Meningococcal Immunization (Series Information) Aged Out     No completion history exists for this topic.              Meningococcal B Vaccine (Series Information) Aged Out     No completion history exists for this topic.              Mammogram  Discontinued        Frequency changed to Never automatically (Topic No Longer Applies)    12/14/2015  MA-SCREEN MAMMO W/CAD-BILAT    12/11/2014  MA-SCREEING MAMMOGRAM W/ CAD    03/22/2013  MA-DIAGNOSTIC DIGITAL MAMMO-UNILAT    03/18/2013  MA-SCREENING MAMMOGRAM W/ CAD     Only the first 5 history entries have been loaded, but more history exists.            Colorectal Cancer Screening  Discontinued        Frequency changed to Never automatically (Topic No Longer Applies)    12/27/2017  REFERRAL TO GI FOR COLONOSCOPY    01/01/2009  Colonoscopy (Done)              Zoster (Shingles) Vaccines  Discontinued      No completion history exists for this topic.              COVID-19 Vaccine  Discontinued     No completion history exists for this topic.                            Patient Care Team:  Azeem Carter P.A.-C. as PCP - General (Family Medicine)  Reginaldo Perez M.D. as Consulting Physician (Diagnostic Radiology)        Social History[2]  Family History   Problem Relation Age of Onset    Heart Disease Mother     Heart Disease Father      She  has a past medical history of Arthritis (03/11/2022), Cancer (HCC) (2013), CATARACT, Cerebral aneurysm, Cholesterol blood decreased, Dental disorder, High cholesterol (03/11/2022), Hyperlipidemia, Hypothyroidism (2/13/2013), OSTEOPOROSIS  "(2/13/2013), Pacemaker, Pain (12/05/2019), S/P cardiac catheterization, Short-term memory loss, and Thyroid disease (03/11/2022).   Past Surgical History[3]    Exam:   /60 (BP Location: Left arm, Patient Position: Sitting, BP Cuff Size: Adult)   Pulse 61   Temp 36 °C (96.8 °F) (Temporal)   Ht 1.54 m (5' 0.63\")   Wt 56.6 kg (124 lb 12.8 oz)   SpO2 96%  Body mass index is 23.87 kg/m².    Hearing excellent.    Dentition good  Alert, oriented in no acute distress.  Eye contact is good, speech goal directed, affect calm    Assessment and Plan. The following treatment and monitoring plan is recommended:      Assessment & Plan  1. Dementia.  Chronic condition.  - She has been managing her dementia well for many years.  - Short-term memory issues were noted during the visit.  - She was informed about her condition and reassured that her management is effective.  - Follow-up scheduled for 1 year for annual physical.    2. Hypertropia, diplopia and proptosis of the left eye.  - Reports double vision in her left eye.  - Currently seeing an ophthalmologist for this issue.  - Eyelid droops slightly, contributing to vision problems.  - Continue follow-up with neuro ophthalmologist and ophthalmologist.    3. Osteoarthritis of right thumb.  Reports no pain.  - Diagnosis confirmed by x-ray.  - Reports no pain in her right thumb.  - No new symptoms or changes reported.  - Continue current management.    4. Chronic kidney disease.  Stage IIIa.  - GFR level is slightly low, indicating chronic kidney disease.  - Advised to drink plenty of water throughout the day to maintain kidney function.  - Labs will be ordered to monitor kidney function.  - Continue monitoring and hydration advice.    5. Osteopenia.  - History of osteopenia, currently taking calcium and vitamin D supplements.  - Due for a DEXA scan to assess progression to osteoporosis.  - DEXA scan will be ordered.  - Continue current supplementation and schedule DEXA " scan.    6. Health maintenance.  - Due for a DEXA scan, which will be ordered.  - Complete blood count, folate level, cholesterol level, liver function, and kidney function tests will be ordered.  - Advised to fast for 8 hours before the tests.  - Labs to be completed as ordered.    7.  White matter abnormality on MRI brain.  - Chronic condition.  Reviewed recent MRI results.  Stable.    8.  Risk for falls.  - Continue with use of walker.  No recent falls noted.    9.  Low folate.  - Chronic condition.  Continue  folate supplement as directed.  Will check folate level.    10.  Left knee pain.  - New condition noted in chart but chronic.  Will continue to monitor.  Unfortunately did not assess the knee further during the visit today.    11.  Hypothyroidism.  - Chronic condition.  Status unknown.  Continue levothyroxine as directed.  Will check TSH value.    12.  Dyspnea on exertion.  - Chronic condition when getting up from a seated position.  Will continue to monitor.  No dyspnea on exertion noted today.    13.  Decreased hearing of both ears.  - Chronic condition.  Continue with hearing aids.  They do appear to be effective.    14.  Cardiac pacemaker in situ.  Chronic condition.  Stable.  Asymptomatic.    15.  Carotid artery aneurysm.  - Chronic condition.  Stable.  Status post coil.  Continue statin.    16.  Hypokalemia.  - Chronic condition.  Stable.  Will check her kidney function.  Continue with the potassium supplement daily.      Follow-up  - The patient will follow up in 1 year.      1. Medicare annual wellness visit, subsequent    2. Hypertropia of left eye    3. Moderate late onset Alzheimer's dementia, unspecified whether behavioral, psychotic, or mood disturbance or anxiety (HCC)    4. Risk for falls    5. Osteoarthritis of thumb, right    6. Ptosis of right eyelid    7. Cardiac pacemaker in situ    8. Dyspnea on exertion    9. Chronic pain of left knee    10. Ophthalmoplegia    11. Stage 3a chronic  kidney disease    12. Monocular diplopia of left eye    13. Decreased hearing of both ears    14. Hyperlipidemia with target LDL less than 100  - Lipid Profile; Future    15. Carotid artery aneurysm (HCC)    16. Hypokalemia  - Comp Metabolic Panel; Future    17. Hypothyroidism, unspecified type  - CBC WITHOUT DIFFERENTIAL; Future    18. Low folate  - FOLATE; Future    19. Osteopenia of multiple sites  - DS-BONE DENSITY STUDY (DEXA); Future    20. White matter abnormality on MRI of brain      Services suggested: None  Health Care Screening: Age-appropriate preventive services recommended by USPTF and ACIP covered by Medicare were discussed today. Services ordered if indicated and agreed upon by the patient.  Referrals offered: Community-based lifestyle interventions to reduce health risks and promote self-management and wellness, fall prevention, nutrition, physical activity, tobacco-use cessation, weight loss, and mental health services as per orders if indicated.    Discussion today about general wellness and lifestyle habits:    Prevent falls and reduce trip hazards; Cautioned about securing or removing rugs.  Have a working fire alarm and carbon monoxide detector;   Engage in regular physical activity and social activities     Follow-up: Return in about 1 year (around 5/14/2026), or if symptoms worsen or fail to improve, for Annual.         [1]   Current Outpatient Medications   Medication Sig Dispense Refill    levothyroxine (SYNTHROID) 75 MCG Tab Take 1 Tablet by mouth every morning on an empty stomach. 90 Tablet 0    potassium chloride ER (KLOR-CON) 10 MEQ tablet TAKE 1 TABLET BY MOUTH TWICE  DAILY 180 Tablet 3    atorvastatin (LIPITOR) 40 MG Tab TAKE 1 TABLET BY MOUTH DAILY 90 Tablet 3    Levomefolate Glucosamine (METHYL-FOLATE PO) Take  by mouth.      cyanocobalamin (VITAMIN B-12) 500 MCG Tab Take 500 mcg by mouth every day.      Calcium Carbonate-Vitamin D (CALCIUM 600/VITAMIN D PO) Take  by mouth.       aspirin (ASA) 81 MG CHEW Take 81 mg by mouth every morning.       No current facility-administered medications for this visit.   [2]   Social History  Tobacco Use    Smoking status: Never    Smokeless tobacco: Never   Vaping Use    Vaping status: Never Used   Substance Use Topics    Alcohol use: No     Alcohol/week: 0.0 oz    Drug use: No   [3]   Past Surgical History:  Procedure Laterality Date    GA NEUROPLASTY & OR TRANSPOS MEDIAN NRV CARPAL MILAGROS Right 3/17/2022    Procedure: RIGHT OPEN CARPAL TUNNEL RELEASE, WRIST, THUMB AND WRIST DENERVATION;  Surgeon: Luiza Solorzano M.D.;  Location: SURGERY SAME DAY [facility];  Service: Orthopedics    NEURECTOMY  3/17/2022    Procedure: NEURECTOMY;  Surgeon: Luiza Solorzano M.D.;  Location: SURGERY SAME DAY [facility];  Service: Orthopedics    RECOVERY  3/21/2016    Procedure: CATH LAB C WITH POSSIBLE WIEDENBECK ;  Surgeon: Recoveryonol[facility] Surgery;  Location: SURGERY PRE-POST PROC UNIT [facility];  Service:     SHOULDER SURGERY Left 2016    RECOVERY  7/24/2014    Performed by [facility] at SURGERY SAME DAY [facility]    RECOVERY  10/16/2013    Performed by [facility] at [facility] ORS    SHOULDER SURGERY Right 1998    GYN SURGERY  1982    hysterectomy    OTHER Bilateral     cataract removal    OTHER NEUROLOGICAL SURG      cerebral aneurysm    TONSILLECTOMY

## 2025-05-19 DIAGNOSIS — E78.5 HYPERLIPIDEMIA WITH TARGET LDL LESS THAN 100: ICD-10-CM

## 2025-05-20 RX ORDER — ATORVASTATIN CALCIUM 40 MG/1
40 TABLET, FILM COATED ORAL DAILY
Qty: 90 TABLET | Refills: 3 | Status: SHIPPED | OUTPATIENT
Start: 2025-05-20

## 2025-06-17 ENCOUNTER — APPOINTMENT (OUTPATIENT)
Dept: MEDICAL GROUP | Facility: MEDICAL CENTER | Age: 85
End: 2025-06-17
Payer: MEDICARE

## 2025-06-17 DIAGNOSIS — E03.9 HYPOTHYROIDISM, UNSPECIFIED TYPE: ICD-10-CM

## 2025-06-17 RX ORDER — LEVOTHYROXINE SODIUM 75 UG/1
75 TABLET ORAL
Qty: 90 TABLET | Refills: 3 | Status: SHIPPED | OUTPATIENT
Start: 2025-06-17 | End: 2025-06-19 | Stop reason: SDUPTHER

## 2025-06-18 DIAGNOSIS — E03.9 HYPOTHYROIDISM, UNSPECIFIED TYPE: Primary | ICD-10-CM

## 2025-06-19 DIAGNOSIS — E03.9 HYPOTHYROIDISM, UNSPECIFIED TYPE: ICD-10-CM

## 2025-06-19 NOTE — TELEPHONE ENCOUNTER
Received request via: Pharmacy    Was the patient seen in the last year in this department? Yes    Does the patient have an active prescription (recently filled or refills available) for medication(s) requested? No    [facility]    Does the patient have long-term Plus and need 100-day supply? (This applies to ALL medications) Patient does not have SCP

## 2025-06-20 ENCOUNTER — HOSPITAL ENCOUNTER (OUTPATIENT)
Dept: LAB | Facility: MEDICAL CENTER | Age: 85
End: 2025-06-20
Attending: PHYSICIAN ASSISTANT
Payer: MEDICARE

## 2025-06-20 ENCOUNTER — RESULTS FOLLOW-UP (OUTPATIENT)
Dept: MEDICAL GROUP | Facility: MEDICAL CENTER | Age: 85
End: 2025-06-20

## 2025-06-20 DIAGNOSIS — E53.8 LOW FOLATE: ICD-10-CM

## 2025-06-20 DIAGNOSIS — E87.6 HYPOKALEMIA: ICD-10-CM

## 2025-06-20 DIAGNOSIS — E03.9 HYPOTHYROIDISM, UNSPECIFIED TYPE: ICD-10-CM

## 2025-06-20 DIAGNOSIS — E78.5 HYPERLIPIDEMIA WITH TARGET LDL LESS THAN 100: ICD-10-CM

## 2025-06-20 LAB
ALBUMIN SERPL BCP-MCNC: 4.2 G/DL (ref 3.2–4.9)
ALBUMIN/GLOB SERPL: 1.4 G/DL
ALP SERPL-CCNC: 108 U/L (ref 30–99)
ALT SERPL-CCNC: 10 U/L (ref 2–50)
ANION GAP SERPL CALC-SCNC: 14 MMOL/L (ref 7–16)
AST SERPL-CCNC: 21 U/L (ref 12–45)
BILIRUB SERPL-MCNC: 0.7 MG/DL (ref 0.1–1.5)
BUN SERPL-MCNC: 22 MG/DL (ref 8–22)
CALCIUM ALBUM COR SERPL-MCNC: 9.2 MG/DL (ref 8.5–10.5)
CALCIUM SERPL-MCNC: 9.4 MG/DL (ref 8.5–10.5)
CHLORIDE SERPL-SCNC: 105 MMOL/L (ref 96–112)
CHOLEST SERPL-MCNC: 147 MG/DL (ref 100–199)
CO2 SERPL-SCNC: 22 MMOL/L (ref 20–33)
CREAT SERPL-MCNC: 1.29 MG/DL (ref 0.5–1.4)
ERYTHROCYTE [DISTWIDTH] IN BLOOD BY AUTOMATED COUNT: 47.7 FL (ref 35.9–50)
FASTING STATUS PATIENT QL REPORTED: NORMAL
FOLATE SERPL-MCNC: 11.7 NG/ML
GFR SERPLBLD CREATININE-BSD FMLA CKD-EPI: 41 ML/MIN/1.73 M 2
GLOBULIN SER CALC-MCNC: 3.1 G/DL (ref 1.9–3.5)
GLUCOSE SERPL-MCNC: 95 MG/DL (ref 65–99)
HCT VFR BLD AUTO: 39 % (ref 37–47)
HDLC SERPL-MCNC: 73 MG/DL
HGB BLD-MCNC: 13 G/DL (ref 12–16)
LDLC SERPL CALC-MCNC: 61 MG/DL
MCH RBC QN AUTO: 32.7 PG (ref 27–33)
MCHC RBC AUTO-ENTMCNC: 33.3 G/DL (ref 32.2–35.5)
MCV RBC AUTO: 98.2 FL (ref 81.4–97.8)
PLATELET # BLD AUTO: 179 K/UL (ref 164–446)
PMV BLD AUTO: 12.5 FL (ref 9–12.9)
POTASSIUM SERPL-SCNC: 4.3 MMOL/L (ref 3.6–5.5)
PROT SERPL-MCNC: 7.3 G/DL (ref 6–8.2)
RBC # BLD AUTO: 3.97 M/UL (ref 4.2–5.4)
SODIUM SERPL-SCNC: 141 MMOL/L (ref 135–145)
T4 FREE SERPL-MCNC: 1.43 NG/DL (ref 0.93–1.7)
TRIGL SERPL-MCNC: 64 MG/DL (ref 0–149)
TSH SERPL DL<=0.005 MIU/L-ACNC: 5.9 UIU/ML (ref 0.38–5.33)
WBC # BLD AUTO: 5.5 K/UL (ref 4.8–10.8)

## 2025-06-20 PROCEDURE — 82746 ASSAY OF FOLIC ACID SERUM: CPT

## 2025-06-20 PROCEDURE — 84443 ASSAY THYROID STIM HORMONE: CPT

## 2025-06-20 PROCEDURE — 80061 LIPID PANEL: CPT

## 2025-06-20 PROCEDURE — 80053 COMPREHEN METABOLIC PANEL: CPT

## 2025-06-20 PROCEDURE — 36415 COLL VENOUS BLD VENIPUNCTURE: CPT

## 2025-06-20 PROCEDURE — 84439 ASSAY OF FREE THYROXINE: CPT

## 2025-06-20 PROCEDURE — 85027 COMPLETE CBC AUTOMATED: CPT

## 2025-06-20 RX ORDER — LEVOTHYROXINE SODIUM 75 UG/1
75 TABLET ORAL
Qty: 100 TABLET | Refills: 3 | Status: SHIPPED | OUTPATIENT
Start: 2025-06-20

## 2025-07-01 ENCOUNTER — APPOINTMENT (OUTPATIENT)
Dept: RADIOLOGY | Facility: MEDICAL CENTER | Age: 85
End: 2025-07-01
Attending: PHYSICIAN ASSISTANT
Payer: MEDICARE

## 2025-07-01 DIAGNOSIS — M85.89 OSTEOPENIA OF MULTIPLE SITES: ICD-10-CM

## 2025-07-01 PROCEDURE — 77080 DXA BONE DENSITY AXIAL: CPT

## 2025-08-06 ENCOUNTER — OFFICE VISIT (OUTPATIENT)
Dept: OPHTHALMOLOGY | Facility: MEDICAL CENTER | Age: 85
End: 2025-08-06
Payer: MEDICARE

## 2025-08-06 DIAGNOSIS — H50.21 HYPOTROPIA OF RIGHT EYE: ICD-10-CM

## 2025-08-06 DIAGNOSIS — H02.401 PTOSIS OF RIGHT EYELID: Primary | ICD-10-CM

## 2025-08-06 DIAGNOSIS — I72.0 CAROTID ARTERY ANEURYSM (HCC): ICD-10-CM

## 2025-08-06 DIAGNOSIS — F02.B0 MODERATE LATE ONSET ALZHEIMER'S DEMENTIA, UNSPECIFIED WHETHER BEHAVIORAL, PSYCHOTIC, OR MOOD DISTURBANCE OR ANXIETY (HCC): ICD-10-CM

## 2025-08-06 DIAGNOSIS — H49.21 6TH NERVE PALSY, RIGHT: ICD-10-CM

## 2025-08-06 DIAGNOSIS — G30.1 MODERATE LATE ONSET ALZHEIMER'S DEMENTIA, UNSPECIFIED WHETHER BEHAVIORAL, PSYCHOTIC, OR MOOD DISTURBANCE OR ANXIETY (HCC): ICD-10-CM

## 2025-08-06 PROCEDURE — 99215 OFFICE O/P EST HI 40 MIN: CPT | Mod: 25 | Performed by: STUDENT IN AN ORGANIZED HEALTH CARE EDUCATION/TRAINING PROGRAM

## 2025-08-06 PROCEDURE — 92060 SENSORIMOTOR EXAMINATION: CPT | Performed by: STUDENT IN AN ORGANIZED HEALTH CARE EDUCATION/TRAINING PROGRAM

## 2025-08-06 PROCEDURE — 92133 CPTRZD OPH DX IMG PST SGM ON: CPT | Performed by: STUDENT IN AN ORGANIZED HEALTH CARE EDUCATION/TRAINING PROGRAM

## 2025-08-06 ASSESSMENT — VISUAL ACUITY
OD_CC: 20/50
OD_PH_CC+: -2
OD_CC+: +2
METHOD: SNELLEN - LINEAR
OS_CC+: -1
CORRECTION_TYPE: GLASSES
OS_CC: 20/20
OD_PH_CC: 20/30

## 2025-08-06 ASSESSMENT — CONF VISUAL FIELD
OS_INFERIOR_NASAL_RESTRICTION: 0
OD_INFERIOR_TEMPORAL_RESTRICTION: 0
OS_NORMAL: 1
OD_NORMAL: 1
OD_SUPERIOR_TEMPORAL_RESTRICTION: 0
OD_INFERIOR_NASAL_RESTRICTION: 0
OS_INFERIOR_TEMPORAL_RESTRICTION: 0
OD_SUPERIOR_NASAL_RESTRICTION: 0
OS_SUPERIOR_NASAL_RESTRICTION: 0
OS_SUPERIOR_TEMPORAL_RESTRICTION: 0

## 2025-08-06 ASSESSMENT — REFRACTION_FINALRX
OS_HPRISM: 7 BO
OD_HPRISM: 7 BO
OD_VPRISM: 4 BU
OS_VPRISM: 4 BD

## 2025-08-06 ASSESSMENT — CUP TO DISC RATIO
OD_RATIO: 0.6
OS_RATIO: 0.6

## 2025-08-06 ASSESSMENT — TONOMETRY
IOP_METHOD: ICARE
OS_IOP_MMHG: 10
OD_IOP_MMHG: 11

## 2025-08-06 ASSESSMENT — REFRACTION_MANIFEST
OS_AXIS: 175
OS_CYLINDER: +1.50
OD_SPHERE: +0.75
METHOD_AUTOREFRACTION: 1
OD_CYLINDER: +4.75
OD_AXIS: 178
OS_SPHERE: -2.00

## 2025-08-06 ASSESSMENT — REFRACTION_WEARINGRX
OS_ADD: +1.00
SPECS_TYPE: PAL
OD_CYLINDER: +0.50
OD_AXIS: 008
OS_CYLINDER: +1.25
OD_ADD: +1.00
OS_SPHERE: -1.25
OD_SPHERE: +0.50
OS_AXIS: 003

## 2025-08-06 ASSESSMENT — EXTERNAL EXAM - LEFT EYE: OS_EXAM: NORMAL

## 2025-08-06 ASSESSMENT — SLIT LAMP EXAM - LIDS
COMMENTS: NORMAL
COMMENTS: PTOSIS

## 2025-08-06 ASSESSMENT — EXTERNAL EXAM - RIGHT EYE: OD_EXAM: NORMAL

## 2025-08-19 ENCOUNTER — OFFICE VISIT (OUTPATIENT)
Dept: MEDICAL GROUP | Facility: MEDICAL CENTER | Age: 85
End: 2025-08-19
Payer: MEDICARE

## 2025-08-19 ENCOUNTER — APPOINTMENT (OUTPATIENT)
Dept: MEDICAL GROUP | Facility: MEDICAL CENTER | Age: 85
End: 2025-08-19
Payer: MEDICARE

## 2025-08-19 VITALS
TEMPERATURE: 97.7 F | OXYGEN SATURATION: 96 % | SYSTOLIC BLOOD PRESSURE: 122 MMHG | HEIGHT: 56 IN | WEIGHT: 124.8 LBS | HEART RATE: 102 BPM | BODY MASS INDEX: 28.07 KG/M2 | RESPIRATION RATE: 16 BRPM | DIASTOLIC BLOOD PRESSURE: 76 MMHG

## 2025-08-19 DIAGNOSIS — F02.B0 MODERATE LATE ONSET ALZHEIMER'S DEMENTIA, UNSPECIFIED WHETHER BEHAVIORAL, PSYCHOTIC, OR MOOD DISTURBANCE OR ANXIETY (HCC): ICD-10-CM

## 2025-08-19 DIAGNOSIS — Z97.4 WEARS HEARING AID IN BOTH EARS: ICD-10-CM

## 2025-08-19 DIAGNOSIS — G30.1 MODERATE LATE ONSET ALZHEIMER'S DEMENTIA, UNSPECIFIED WHETHER BEHAVIORAL, PSYCHOTIC, OR MOOD DISTURBANCE OR ANXIETY (HCC): ICD-10-CM

## 2025-08-19 PROCEDURE — 1126F AMNT PAIN NOTED NONE PRSNT: CPT | Performed by: PHYSICIAN ASSISTANT

## 2025-08-19 PROCEDURE — 3078F DIAST BP <80 MM HG: CPT | Performed by: PHYSICIAN ASSISTANT

## 2025-08-19 PROCEDURE — 99213 OFFICE O/P EST LOW 20 MIN: CPT | Performed by: PHYSICIAN ASSISTANT

## 2025-08-19 PROCEDURE — 3074F SYST BP LT 130 MM HG: CPT | Performed by: PHYSICIAN ASSISTANT

## 2025-08-19 ASSESSMENT — FIBROSIS 4 INDEX: FIB4 SCORE: 3.15

## 2025-08-19 ASSESSMENT — PAIN SCALES - GENERAL: PAINLEVEL_OUTOF10: NO PAIN

## 2025-08-22 DIAGNOSIS — E87.6 HYPOKALEMIA: ICD-10-CM

## 2025-08-24 RX ORDER — POTASSIUM CHLORIDE 750 MG/1
10 TABLET, EXTENDED RELEASE ORAL 2 TIMES DAILY
Qty: 180 TABLET | Refills: 2 | Status: SHIPPED | OUTPATIENT
Start: 2025-08-24

## (undated) DEVICE — HEAD HOLDER JUNIOR/ADULT

## (undated) DEVICE — SET LEADWIRE 5 LEAD BEDSIDE DISPOSABLE ECG (1SET OF 5/EA)

## (undated) DEVICE — GOWN WARMING STANDARD FLEX - (30/CA)

## (undated) DEVICE — KIT ANESTHESIA W/CIRCUIT & 3/LT BAG W/FILTER (20EA/CA)

## (undated) DEVICE — SUTURE 4-0 ETHILON PS-2 18 BLACK (36PK/BX)

## (undated) DEVICE — LACTATED RINGERS INJ 1000 ML - (14EA/CA 60CA/PF)

## (undated) DEVICE — TOWEL STOP TIMEOUT SAFETY FLAG (40EA/CA)

## (undated) DEVICE — CANISTER SUCTION RIGID RED 1500CC (40EA/CA)

## (undated) DEVICE — SUTURE GENERAL

## (undated) DEVICE — CATHETER IV 20 GA X 1-1/4 ---SURG.& SDS ONLY--- (50EA/BX)

## (undated) DEVICE — GLOVE BIOGEL SZ 8 SURGICAL PF LTX - (50PR/BX 4BX/CA)

## (undated) DEVICE — PACK UPPER EXTREMITY (2EA/CA)

## (undated) DEVICE — KIT  I.V. START (100EA/CA)

## (undated) DEVICE — PADDING CAST 4 IN STERILE - 4 X 4 YDS (24/CA)

## (undated) DEVICE — TUBING CLEARLINK DUO-VENT - C-FLO (48EA/CA)

## (undated) DEVICE — BANDAGE ELASTIC LATEX STERILE VELCRO 4 X 5 YDS (25EA/CA)

## (undated) DEVICE — TOURNIQUET, STERILE 18 (RED)

## (undated) DEVICE — KNIFE MINI CARPAL TUNNEL  DISPOSABLE (5EA/BX)

## (undated) DEVICE — MASK ANESTHESIA ADULT  - (100/CA)

## (undated) DEVICE — TUBE CONNECTING SUCTION - CLEAR PLASTIC STERILE 72 IN (50EA/CA)

## (undated) DEVICE — SUCTION INSTRUMENT YANKAUER BULBOUS TIP W/O VENT (50EA/CA)

## (undated) DEVICE — SET EXTENSION WITH 2 PORTS (48EA/CA) ***PART #2C8610 IS A SUBSTITUTE*****

## (undated) DEVICE — PROTECTOR ULNA NERVE - (36PR/CA)

## (undated) DEVICE — SENSOR SPO2 NEO LNCS ADHESIVE (20/BX) SEE USER NOTES

## (undated) DEVICE — ELECTRODE 850 FOAM ADHESIVE - HYDROGEL RADIOTRNSPRNT (50/PK)

## (undated) DEVICE — DRESSING XEROFORM 1X8 - (50/BX 4BX/CA)

## (undated) DEVICE — SODIUM CHL IRRIGATION 0.9% 1000ML (12EA/CA)

## (undated) DEVICE — WATER IRRIGATION STERILE 1000ML (12EA/CA)

## (undated) DEVICE — SUTURE 4-0 ETHILON FS-2 18 (36PK/BX)"

## (undated) DEVICE — CANISTER SUCTION 3000ML MECHANICAL FILTER AUTO SHUTOFF MEDI-VAC NONSTERILE LF DISP  (40EA/CA)